# Patient Record
Sex: FEMALE | Race: BLACK OR AFRICAN AMERICAN
[De-identification: names, ages, dates, MRNs, and addresses within clinical notes are randomized per-mention and may not be internally consistent; named-entity substitution may affect disease eponyms.]

---

## 2021-04-10 ENCOUNTER — HOSPITAL ENCOUNTER (EMERGENCY)
Dept: HOSPITAL 19 - COL.ER | Age: 31
Discharge: HOME | End: 2021-04-10
Attending: FAMILY MEDICINE
Payer: MEDICAID

## 2021-04-10 VITALS — DIASTOLIC BLOOD PRESSURE: 77 MMHG | HEART RATE: 88 BPM | SYSTOLIC BLOOD PRESSURE: 140 MMHG

## 2021-04-10 VITALS — TEMPERATURE: 97.9 F

## 2021-04-10 VITALS — BODY MASS INDEX: 38.38 KG/M2 | WEIGHT: 230.38 LBS | HEIGHT: 65 IN

## 2021-04-10 DIAGNOSIS — Z32.02: ICD-10-CM

## 2021-04-10 DIAGNOSIS — M54.5: ICD-10-CM

## 2021-04-10 DIAGNOSIS — I10: ICD-10-CM

## 2021-04-10 DIAGNOSIS — Z90.710: ICD-10-CM

## 2021-04-10 DIAGNOSIS — N76.0: Primary | ICD-10-CM

## 2021-04-10 DIAGNOSIS — F17.210: ICD-10-CM

## 2021-04-10 LAB
ALBUMIN SERPL-MCNC: 4.8 GM/DL (ref 3.5–5)
ALP SERPL-CCNC: 70 U/L (ref 50–136)
ALT SERPL-CCNC: 25 U/L (ref 4–34)
ANION GAP SERPL CALC-SCNC: 11 MMOL/L (ref 7–16)
AST SERPL-CCNC: 30 U/L (ref 15–37)
BASOPHILS # BLD: 0.1 10*3/UL (ref 0–0.2)
BASOPHILS NFR BLD AUTO: 0.4 % (ref 0–2)
BILIRUB SERPL-MCNC: 0.5 MG/DL (ref 0–1)
BUN SERPL-MCNC: 13 MG/DL (ref 7–17)
CALCIUM SERPL-MCNC: 9.4 MG/DL (ref 8.4–10.2)
CHLORIDE SERPL-SCNC: 106 MMOL/L (ref 98–107)
CO2 SERPL-SCNC: 23 MMOL/L (ref 22–30)
CREAT SERPL-SCNC: 0.68 UMOL/L (ref 0.52–1.25)
EOSINOPHIL # BLD: 0.2 10*3/UL (ref 0–0.7)
EOSINOPHIL NFR BLD: 2 % (ref 0–4)
ERYTHROCYTE [DISTWIDTH] IN BLOOD BY AUTOMATED COUNT: 12 % (ref 11.5–14.5)
GLUCOSE SERPL-MCNC: 104 MG/DL (ref 74–106)
GRANULOCYTES # BLD AUTO: 73 % (ref 42.2–75.2)
HCT VFR BLD AUTO: 42.1 % (ref 37–47)
HGB BLD-MCNC: 14.5 G/DL (ref 12.5–16)
LYMPHOCYTES # BLD: 1.9 10*3/UL (ref 1.2–3.4)
LYMPHOCYTES NFR BLD: 17 % (ref 20–51)
MCH RBC QN AUTO: 31 PG (ref 27–31)
MCHC RBC AUTO-ENTMCNC: 34 G/DL (ref 33–37)
MCV RBC AUTO: 89 FL (ref 80–100)
MONOCYTES # BLD: 0.8 10*3/UL (ref 0.1–0.6)
MONOCYTES NFR BLD AUTO: 7.3 % (ref 1.7–9.3)
NEUTROPHILS # BLD: 8.3 10*3/UL (ref 1.4–6.5)
PH UR STRIP.AUTO: 5 [PH] (ref 5–8)
PLATELET # BLD AUTO: 198 K/MM3 (ref 130–400)
PMV BLD AUTO: 10.7 FL (ref 7.4–10.4)
POTASSIUM SERPL-SCNC: 3.5 MMOL/L (ref 3.4–5)
PROT SERPL-MCNC: 8.8 GM/DL (ref 6.4–8.2)
RBC # BLD AUTO: 4.73 M/MM3 (ref 4.1–5.3)
RBC # UR: (no result) /HPF
SODIUM SERPL-SCNC: 140 MMOL/L (ref 137–145)
SP GR UR STRIP.AUTO: 1.02 (ref 1–1.03)
SQUAMOUS # URNS: (no result) /HPF
UA DIPSTICK PNL UR STRIP.AUTO: (no result)
URN COLLECT METHOD CLASS: (no result)
WBC # UR: (no result) /HPF
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
## 2021-05-26 ENCOUNTER — HOSPITAL ENCOUNTER (EMERGENCY)
Dept: HOSPITAL 19 - COL.ER | Age: 31
Discharge: HOME | End: 2021-05-26
Attending: EMERGENCY MEDICINE
Payer: MEDICAID

## 2021-05-26 VITALS — TEMPERATURE: 98.9 F | DIASTOLIC BLOOD PRESSURE: 96 MMHG | SYSTOLIC BLOOD PRESSURE: 152 MMHG | HEART RATE: 83 BPM

## 2021-05-26 VITALS — BODY MASS INDEX: 36.73 KG/M2 | HEIGHT: 65 IN | WEIGHT: 220.46 LBS

## 2021-05-26 DIAGNOSIS — F17.210: ICD-10-CM

## 2021-05-26 DIAGNOSIS — Z90.710: ICD-10-CM

## 2021-05-26 DIAGNOSIS — J45.909: ICD-10-CM

## 2021-05-26 DIAGNOSIS — I10: ICD-10-CM

## 2021-05-26 DIAGNOSIS — E11.9: ICD-10-CM

## 2021-05-26 DIAGNOSIS — R45.851: Primary | ICD-10-CM

## 2021-05-26 DIAGNOSIS — Z79.899: ICD-10-CM

## 2021-05-26 DIAGNOSIS — N93.9: ICD-10-CM

## 2021-05-26 LAB
ALBUMIN SERPL-MCNC: 4.1 GM/DL (ref 3.5–5)
ALP SERPL-CCNC: 69 U/L (ref 50–136)
ALT SERPL-CCNC: 23 U/L (ref 4–34)
ANION GAP SERPL CALC-SCNC: 8 MMOL/L (ref 7–16)
APAP SERPL-MCNC: < 10 UG/ML (ref 10–30)
AST SERPL-CCNC: 25 U/L (ref 15–37)
BASOPHILS # BLD: 0.1 10*3/UL (ref 0–0.2)
BASOPHILS NFR BLD AUTO: 0.6 % (ref 0–2)
BILIRUB SERPL-MCNC: 0.2 MG/DL (ref 0–1)
BUN SERPL-MCNC: 17 MG/DL (ref 7–17)
CALCIUM SERPL-MCNC: 9.1 MG/DL (ref 8.4–10.2)
CHLORIDE SERPL-SCNC: 110 MMOL/L (ref 98–107)
CO2 SERPL-SCNC: 23 MMOL/L (ref 22–30)
CREAT SERPL-SCNC: 0.7 UMOL/L (ref 0.52–1.25)
DRUGS UR SCN NOM: NEGATIVE NG/ML
EOSINOPHIL # BLD: 0.4 10*3/UL (ref 0–0.7)
EOSINOPHIL NFR BLD: 5 % (ref 0–4)
ERYTHROCYTE [DISTWIDTH] IN BLOOD BY AUTOMATED COUNT: 12.3 % (ref 11.5–14.5)
ETHANOL SPEC-SCNC: < 10 MG/DL
GLUCOSE SERPL-MCNC: 98 MG/DL (ref 74–106)
GRANULOCYTES # BLD AUTO: 62.8 % (ref 42.2–75.2)
HCT VFR BLD AUTO: 38.8 % (ref 37–47)
HGB BLD-MCNC: 13.3 G/DL (ref 12.5–16)
LYMPHOCYTES # BLD: 1.9 10*3/UL (ref 1.2–3.4)
LYMPHOCYTES NFR BLD: 24.1 % (ref 20–51)
MCH RBC QN AUTO: 31 PG (ref 27–31)
MCHC RBC AUTO-ENTMCNC: 34 G/DL (ref 33–37)
MCV RBC AUTO: 92 FL (ref 80–100)
MONOCYTES # BLD: 0.6 10*3/UL (ref 0.1–0.6)
MONOCYTES NFR BLD AUTO: 7.2 % (ref 1.7–9.3)
NEUTROPHILS # BLD: 5 10*3/UL (ref 1.4–6.5)
PH UR STRIP.AUTO: 5 [PH] (ref 5–8)
PLATELET # BLD AUTO: 208 K/MM3 (ref 130–400)
PMV BLD AUTO: 11 FL (ref 7.4–10.4)
POTASSIUM SERPL-SCNC: 4.3 MMOL/L (ref 3.4–5)
PROT SERPL-MCNC: 7.4 GM/DL (ref 6.4–8.2)
RBC # BLD AUTO: 4.24 M/MM3 (ref 4.1–5.3)
RBC # UR STRIP.AUTO: (no result) /UL
RBC # UR: (no result) /HPF
SALICYLATES SERPL-MCNC: < 1 MG/DL
SODIUM SERPL-SCNC: 141 MMOL/L (ref 137–145)
SP GR UR STRIP.AUTO: 1.02 (ref 1–1.03)
SQUAMOUS # URNS: (no result) /HPF
TSH SERPL DL<=0.005 MIU/L-ACNC: 0.87 UIU/ML (ref 0.47–4.68)
URN COLLECT METHOD CLASS: (no result)

## 2021-09-03 ENCOUNTER — HOSPITAL ENCOUNTER (EMERGENCY)
Dept: HOSPITAL 19 - COL.ER | Age: 31
Discharge: HOME | End: 2021-09-03
Attending: PERSONAL EMERGENCY RESPONSE ATTENDANT
Payer: SELF-PAY

## 2021-09-03 VITALS — SYSTOLIC BLOOD PRESSURE: 164 MMHG | HEART RATE: 91 BPM | DIASTOLIC BLOOD PRESSURE: 94 MMHG

## 2021-09-03 VITALS — HEIGHT: 65 IN | WEIGHT: 200.4 LBS | BODY MASS INDEX: 33.39 KG/M2

## 2021-09-03 VITALS — TEMPERATURE: 97.4 F

## 2021-09-03 DIAGNOSIS — A59.9: ICD-10-CM

## 2021-09-03 DIAGNOSIS — E11.9: ICD-10-CM

## 2021-09-03 DIAGNOSIS — J06.9: Primary | ICD-10-CM

## 2021-09-03 DIAGNOSIS — F17.210: ICD-10-CM

## 2021-09-03 DIAGNOSIS — Z20.822: ICD-10-CM

## 2021-09-03 LAB
ALBUMIN SERPL-MCNC: 3.9 GM/DL (ref 3.5–5)
ALP SERPL-CCNC: 66 U/L (ref 50–136)
ALT SERPL-CCNC: 20 U/L (ref 4–34)
ANION GAP SERPL CALC-SCNC: 6 MMOL/L (ref 7–16)
AST SERPL-CCNC: 26 U/L (ref 15–37)
BASOPHILS # BLD: 0.1 10*3/UL (ref 0–0.2)
BASOPHILS NFR BLD AUTO: 0.5 % (ref 0–2)
BILIRUB SERPL-MCNC: 0.3 MG/DL (ref 0–1)
BUN SERPL-MCNC: 16 MG/DL (ref 7–17)
CALCIUM SERPL-MCNC: 8.9 MG/DL (ref 8.4–10.2)
CHLORIDE SERPL-SCNC: 109 MMOL/L (ref 98–107)
CO2 SERPL-SCNC: 25 MMOL/L (ref 22–30)
CREAT SERPL-SCNC: 0.84 UMOL/L (ref 0.52–1.25)
CRP SERPL-MCNC: < 0.5 MG/DL (ref 0–0.9)
EOSINOPHIL # BLD: 0.5 10*3/UL (ref 0–0.7)
EOSINOPHIL NFR BLD: 5.3 % (ref 0–4)
ERYTHROCYTE [DISTWIDTH] IN BLOOD BY AUTOMATED COUNT: 12.5 % (ref 11.5–14.5)
GLUCOSE SERPL-MCNC: 104 MG/DL (ref 74–106)
GRANULOCYTES # BLD AUTO: 58.5 % (ref 42.2–75.2)
HCT VFR BLD AUTO: 37.5 % (ref 37–47)
HGB BLD-MCNC: 13.1 G/DL (ref 12.5–16)
LIPASE SERPL-CCNC: 139 U/L (ref 23–300)
LYMPHOCYTES # BLD: 2.8 10*3/UL (ref 1.2–3.4)
LYMPHOCYTES NFR BLD: 30.1 % (ref 20–51)
MCH RBC QN AUTO: 31 PG (ref 27–31)
MCHC RBC AUTO-ENTMCNC: 35 G/DL (ref 33–37)
MCV RBC AUTO: 89 FL (ref 80–100)
MONOCYTES # BLD: 0.5 10*3/UL (ref 0.1–0.6)
MONOCYTES NFR BLD AUTO: 5.4 % (ref 1.7–9.3)
NEUTROPHILS # BLD: 5.4 10*3/UL (ref 1.4–6.5)
PH UR STRIP.AUTO: 8 [PH] (ref 5–8)
PLATELET # BLD AUTO: 214 K/MM3 (ref 130–400)
PMV BLD AUTO: 10.7 FL (ref 7.4–10.4)
POTASSIUM SERPL-SCNC: 3.8 MMOL/L (ref 3.4–5)
PROT SERPL-MCNC: 6.8 GM/DL (ref 6.4–8.2)
RBC # BLD AUTO: 4.23 M/MM3 (ref 4.1–5.3)
RBC # UR STRIP.AUTO: (no result) /UL
RBC # UR: (no result) /HPF
SODIUM SERPL-SCNC: 140 MMOL/L (ref 137–145)
SP GR UR STRIP.AUTO: 1.02 (ref 1–1.03)
SQUAMOUS # URNS: (no result) /HPF
URN COLLECT METHOD CLASS: (no result)

## 2021-09-15 ENCOUNTER — HOSPITAL ENCOUNTER (EMERGENCY)
Dept: HOSPITAL 19 - COL.ER | Age: 31
Discharge: HOME | End: 2021-09-15
Attending: EMERGENCY MEDICINE
Payer: SELF-PAY

## 2021-09-15 VITALS — HEART RATE: 72 BPM | DIASTOLIC BLOOD PRESSURE: 114 MMHG | SYSTOLIC BLOOD PRESSURE: 180 MMHG

## 2021-09-15 VITALS — TEMPERATURE: 98.9 F

## 2021-09-15 VITALS — WEIGHT: 220.46 LBS | HEIGHT: 65 IN | BODY MASS INDEX: 36.73 KG/M2

## 2021-09-15 DIAGNOSIS — F17.210: ICD-10-CM

## 2021-09-15 DIAGNOSIS — Z90.710: ICD-10-CM

## 2021-09-15 DIAGNOSIS — Z20.822: ICD-10-CM

## 2021-09-15 DIAGNOSIS — A04.72: Primary | ICD-10-CM

## 2021-09-15 LAB
ALBUMIN SERPL-MCNC: 4.1 GM/DL (ref 3.5–5)
ALP SERPL-CCNC: 67 U/L (ref 50–136)
ALT SERPL-CCNC: 22 U/L (ref 4–34)
ANION GAP SERPL CALC-SCNC: 7 MMOL/L (ref 7–16)
AST SERPL-CCNC: 32 U/L (ref 15–37)
BASOPHILS # BLD: 0.1 10*3/UL (ref 0–0.2)
BASOPHILS NFR BLD AUTO: 0.7 % (ref 0–2)
BILIRUB SERPL-MCNC: 0.3 MG/DL (ref 0–1)
BUN SERPL-MCNC: 8 MG/DL (ref 7–17)
C DIFF TOX A+B STL IA-ACNC: (no result)
C DIFF TOX A+B STL QL IA: (no result)
CALCIUM SERPL-MCNC: 8.8 MG/DL (ref 8.4–10.2)
CHLORIDE SERPL-SCNC: 107 MMOL/L (ref 98–107)
CO2 SERPL-SCNC: 25 MMOL/L (ref 22–30)
CREAT SERPL-SCNC: 0.73 UMOL/L (ref 0.52–1.25)
EOSINOPHIL # BLD: 0.5 10*3/UL (ref 0–0.7)
EOSINOPHIL NFR BLD: 5.8 % (ref 0–4)
ERYTHROCYTE [DISTWIDTH] IN BLOOD BY AUTOMATED COUNT: 12.9 % (ref 11.5–14.5)
GLUCOSE SERPL-MCNC: 103 MG/DL (ref 74–106)
GRANULOCYTES # BLD AUTO: 57.9 % (ref 42.2–75.2)
HCT VFR BLD AUTO: 38.5 % (ref 37–47)
HGB BLD-MCNC: 13.2 G/DL (ref 12.5–16)
LYMPHOCYTES # BLD: 2.6 10*3/UL (ref 1.2–3.4)
LYMPHOCYTES NFR BLD: 28.5 % (ref 20–51)
MCH RBC QN AUTO: 30 PG (ref 27–31)
MCHC RBC AUTO-ENTMCNC: 34 G/DL (ref 33–37)
MCV RBC AUTO: 89 FL (ref 80–100)
MONOCYTES # BLD: 0.6 10*3/UL (ref 0.1–0.6)
MONOCYTES NFR BLD AUTO: 6.9 % (ref 1.7–9.3)
NEUTROPHILS # BLD: 5.3 10*3/UL (ref 1.4–6.5)
PH UR STRIP.AUTO: 6 [PH] (ref 5–8)
PLATELET # BLD AUTO: 212 K/MM3 (ref 130–400)
PMV BLD AUTO: 10.5 FL (ref 7.4–10.4)
POTASSIUM SERPL-SCNC: 3.7 MMOL/L (ref 3.4–5)
PROT SERPL-MCNC: 7.1 GM/DL (ref 6.4–8.2)
RBC # BLD AUTO: 4.34 M/MM3 (ref 4.1–5.3)
RBC # UR: (no result) /HPF
SODIUM SERPL-SCNC: 139 MMOL/L (ref 137–145)
SP GR UR STRIP.AUTO: 1.02 (ref 1–1.03)
SQUAMOUS # URNS: (no result) /HPF
TSH SERPL DL<=0.005 MIU/L-ACNC: 2.15 UIU/ML (ref 0.35–4.94)
URN COLLECT METHOD CLASS: (no result)

## 2021-09-15 NOTE — NUR
met with patient in ED to provide community resources. Patient
does not have insurance coverage or a primary care provider which has created
barriers to obtaining needed presciption medications. Per ED RN, patient needs
an antibiotic upon discharge. SW reviewed [facility] price with patient and she
states she cannot afford to purchase any medications as her last paycheck when
entirely to her rent. Patient lives alone in [place] and works at [employer]. Patient states her mother, Morelia Otero (ph#[phone]) has
custody of her two daughters ages 6 and 8. Patient states she sends money to
them every two weeks. RICKI provided [facility] Resource Guide to patient who
advised she was going to call [facility] to set up an
appointment because [facility] could not get her in until
December. SW also provided information about [facility] to patient. RICKI provided a
medication voucher for patient totaling $111 as well as a taxi voucher so she
could  her medications and get home. RICKI collaborated the above
information to RNElin.

## 2021-10-06 ENCOUNTER — HOSPITAL ENCOUNTER (EMERGENCY)
Dept: HOSPITAL 19 - COL.ER | Age: 31
Discharge: HOME | End: 2021-10-06
Attending: EMERGENCY MEDICINE
Payer: SELF-PAY

## 2021-10-06 VITALS — TEMPERATURE: 98.6 F | DIASTOLIC BLOOD PRESSURE: 76 MMHG | SYSTOLIC BLOOD PRESSURE: 135 MMHG | HEART RATE: 80 BPM

## 2021-10-06 VITALS — WEIGHT: 220.46 LBS | HEIGHT: 65 IN | BODY MASS INDEX: 36.73 KG/M2

## 2021-10-06 DIAGNOSIS — Z20.822: ICD-10-CM

## 2021-10-06 DIAGNOSIS — I10: ICD-10-CM

## 2021-10-06 DIAGNOSIS — N89.8: ICD-10-CM

## 2021-10-06 DIAGNOSIS — E11.9: ICD-10-CM

## 2021-10-06 DIAGNOSIS — J40: Primary | ICD-10-CM

## 2021-10-06 DIAGNOSIS — Z79.899: ICD-10-CM

## 2021-10-06 DIAGNOSIS — F17.200: ICD-10-CM

## 2021-10-06 LAB
ALBUMIN SERPL-MCNC: 4 GM/DL (ref 3.5–5)
ALP SERPL-CCNC: 59 U/L (ref 0–750)
ALT SERPL-CCNC: 20 U/L (ref 0–55)
ANION GAP SERPL CALC-SCNC: 8 MMOL/L (ref 7–16)
AST SERPL-CCNC: 19 U/L (ref 5–34)
BASOPHILS # BLD: 0.1 K/MM3 (ref 0–0.2)
BASOPHILS NFR BLD AUTO: 0.5 % (ref 0–2)
BILIRUB SERPL-MCNC: 0.5 MG/DL (ref 0.2–1.2)
BUN SERPL-MCNC: 5 MG/DL (ref 7–19)
CALCIUM SERPL-MCNC: 9.6 MG/DL (ref 8.4–10.2)
CHLORIDE SERPL-SCNC: 107 MMOL/L (ref 98–107)
CO2 SERPL-SCNC: 25 MMOL/L (ref 22–29)
CREAT SERPL-SCNC: 0.75 MG/DL (ref 0.57–1.11)
EOSINOPHIL # BLD: 0.5 K/MM3 (ref 0–0.7)
EOSINOPHIL NFR BLD: 3.2 % (ref 0–4)
ERYTHROCYTE [DISTWIDTH] IN BLOOD BY AUTOMATED COUNT: 12.5 % (ref 11.5–14.5)
GLUCOSE SERPL-MCNC: 100 MG/DL (ref 70–99)
GRANULOCYTES # BLD AUTO: 76.9 % (ref 42.2–75.2)
HCT VFR BLD AUTO: 40.7 % (ref 37–47)
HGB BLD-MCNC: 14.2 G/DL (ref 12.5–16)
LYMPHOCYTES # BLD: 1.7 K/MM3 (ref 1.2–3.4)
LYMPHOCYTES NFR BLD: 11.2 % (ref 20–51)
MCH RBC QN AUTO: 31 PG (ref 27–31)
MCHC RBC AUTO-ENTMCNC: 35 G/DL (ref 33–37)
MCV RBC AUTO: 88 FL (ref 80–100)
MONOCYTES # BLD: 1.2 K/MM3 (ref 0.1–0.6)
MONOCYTES NFR BLD AUTO: 7.9 % (ref 1.7–9.3)
NEUTROPHILS # BLD: 11.5 K/MM3 (ref 1.4–6.5)
PH UR STRIP.AUTO: 7 [PH] (ref 5–8)
PLATELET # BLD AUTO: 193 K/MM3 (ref 130–400)
PMV BLD AUTO: 10.8 FL (ref 7.4–10.4)
POTASSIUM SERPL-SCNC: 3.9 MMOL/L (ref 3.5–4.5)
PROT SERPL-MCNC: 7.5 GM/DL (ref 6.2–8.1)
RBC # BLD AUTO: 4.61 M/MM3 (ref 4.1–5.3)
RBC # UR: (no result) /HPF
SODIUM SERPL-SCNC: 140 MMOL/L (ref 136–145)
SP GR UR STRIP.AUTO: 1.01 (ref 1–1.03)
SQUAMOUS # URNS: (no result) /HPF
URN COLLECT METHOD CLASS: (no result)

## 2021-12-10 ENCOUNTER — HOSPITAL ENCOUNTER (EMERGENCY)
Dept: HOSPITAL 19 - COL.ER | Age: 31
Discharge: HOME | End: 2021-12-10
Attending: FAMILY MEDICINE
Payer: SELF-PAY

## 2021-12-10 VITALS — DIASTOLIC BLOOD PRESSURE: 95 MMHG | HEART RATE: 78 BPM | SYSTOLIC BLOOD PRESSURE: 157 MMHG

## 2021-12-10 VITALS — TEMPERATURE: 98.1 F

## 2021-12-10 VITALS — BODY MASS INDEX: 37.58 KG/M2 | HEIGHT: 65 IN | WEIGHT: 225.53 LBS

## 2021-12-10 DIAGNOSIS — K02.9: Primary | ICD-10-CM

## 2021-12-10 DIAGNOSIS — F17.210: ICD-10-CM

## 2021-12-10 DIAGNOSIS — I10: ICD-10-CM

## 2021-12-10 DIAGNOSIS — J45.909: ICD-10-CM

## 2021-12-10 DIAGNOSIS — Z79.899: ICD-10-CM

## 2023-01-17 PROBLEM — F33.9 MAJOR DEPRESSION, RECURRENT (HCC): Status: ACTIVE | Noted: 2023-01-17

## 2023-01-17 PROBLEM — F29 PSYCHOSIS, UNSPECIFIED PSYCHOSIS TYPE (HCC): Status: ACTIVE | Noted: 2023-01-17

## 2023-01-18 PROBLEM — F19.10 POLYSUBSTANCE ABUSE (HCC): Status: ACTIVE | Noted: 2023-01-18

## 2023-01-18 PROBLEM — R60.0 LOCALIZED EDEMA: Status: ACTIVE | Noted: 2023-01-18

## 2023-01-18 PROBLEM — I10 PRIMARY HYPERTENSION: Status: ACTIVE | Noted: 2023-01-18

## 2023-01-20 PROBLEM — F14.20 COCAINE USE DISORDER, SEVERE, DEPENDENCE (HCC): Status: ACTIVE | Noted: 2023-01-20

## 2023-01-20 PROBLEM — F12.20 CANNABIS USE DISORDER, SEVERE, DEPENDENCE (HCC): Status: ACTIVE | Noted: 2023-01-20

## 2023-01-20 PROBLEM — F98.8 ADD (ATTENTION DEFICIT DISORDER): Status: ACTIVE | Noted: 2023-01-20

## 2023-02-15 ENCOUNTER — HOSPITAL ENCOUNTER (EMERGENCY)
Age: 33
Discharge: HOME | End: 2023-02-15
Payer: COMMERCIAL

## 2023-02-15 VITALS
OXYGEN SATURATION: 99 % | RESPIRATION RATE: 18 BRPM | TEMPERATURE: 98.3 F | DIASTOLIC BLOOD PRESSURE: 84 MMHG | HEART RATE: 99 BPM | SYSTOLIC BLOOD PRESSURE: 139 MMHG

## 2023-02-15 DIAGNOSIS — J02.8 SORE THROAT (VIRAL): Primary | ICD-10-CM

## 2023-02-15 DIAGNOSIS — R30.0 DYSURIA: ICD-10-CM

## 2023-02-15 DIAGNOSIS — R09.81 NASAL CONGESTION: ICD-10-CM

## 2023-02-15 DIAGNOSIS — R05.1 ACUTE COUGH: ICD-10-CM

## 2023-02-15 DIAGNOSIS — M79.10 MYALGIA: ICD-10-CM

## 2023-02-15 DIAGNOSIS — B97.89 SORE THROAT (VIRAL): Primary | ICD-10-CM

## 2023-02-15 LAB
BACTERIA: ABNORMAL /HPF
BILIRUBIN URINE: NEGATIVE MG/DL
BLOOD, URINE: ABNORMAL
CLARITY: CLEAR
COLOR: YELLOW
GLUCOSE, URINE: NEGATIVE MG/DL
KETONES, URINE: NEGATIVE MG/DL
LEUKOCYTE ESTERASE, URINE: NEGATIVE
MUCUS: ABNORMAL HPF
NITRITE URINE, QUANTITATIVE: NEGATIVE
PH, URINE: 6 (ref 5–8)
PROTEIN UA: NEGATIVE MG/DL
RAPID INFLUENZA  B AGN: NEGATIVE
RAPID INFLUENZA A AGN: NEGATIVE
RBC URINE: 2 /HPF (ref 0–6)
SARS-COV-2 RDRP RESP QL NAA+PROBE: ABNORMAL
SARS-COV-2 RDRP RESP QL NAA+PROBE: NOT DETECTED
SOURCE: ABNORMAL
SOURCE: NORMAL
SPECIFIC GRAVITY UA: 1.01 (ref 1–1.03)
SQUAMOUS EPITHELIAL: <1 /HPF
TRICHOMONAS: ABNORMAL /HPF
UROBILINOGEN, URINE: 0.2 MG/DL (ref 0.2–1)
WBC UA: 2 /HPF (ref 0–5)

## 2023-02-15 PROCEDURE — 87804 INFLUENZA ASSAY W/OPTIC: CPT

## 2023-02-15 PROCEDURE — 87430 STREP A AG IA: CPT

## 2023-02-15 PROCEDURE — 81001 URINALYSIS AUTO W/SCOPE: CPT

## 2023-02-15 PROCEDURE — 87635 SARS-COV-2 COVID-19 AMP PRB: CPT

## 2023-02-15 PROCEDURE — 87086 URINE CULTURE/COLONY COUNT: CPT

## 2023-02-15 PROCEDURE — 87081 CULTURE SCREEN ONLY: CPT

## 2023-02-15 PROCEDURE — 87077 CULTURE AEROBIC IDENTIFY: CPT

## 2023-02-15 PROCEDURE — 99283 EMERGENCY DEPT VISIT LOW MDM: CPT

## 2023-02-15 PROCEDURE — 87088 URINE BACTERIA CULTURE: CPT

## 2023-02-15 PROCEDURE — 87186 SC STD MICRODIL/AGAR DIL: CPT

## 2023-02-15 PROCEDURE — 6370000000 HC RX 637 (ALT 250 FOR IP): Performed by: NURSE PRACTITIONER

## 2023-02-15 RX ORDER — IBUPROFEN 600 MG/1
600 TABLET ORAL ONCE
Status: COMPLETED | OUTPATIENT
Start: 2023-02-15 | End: 2023-02-15

## 2023-02-15 RX ORDER — ACETAMINOPHEN 325 MG/1
650 TABLET ORAL ONCE
Status: COMPLETED | OUTPATIENT
Start: 2023-02-15 | End: 2023-02-15

## 2023-02-15 RX ORDER — GUAIFENESIN 600 MG/1
600 TABLET, EXTENDED RELEASE ORAL 2 TIMES DAILY
Qty: 10 TABLET | Refills: 0 | Status: SHIPPED | OUTPATIENT
Start: 2023-02-15 | End: 2023-02-20

## 2023-02-15 RX ORDER — PSEUDOEPHEDRINE HCL 30 MG
60 TABLET ORAL ONCE
Status: COMPLETED | OUTPATIENT
Start: 2023-02-15 | End: 2023-02-15

## 2023-02-15 RX ADMIN — IBUPROFEN 600 MG: 600 TABLET, FILM COATED ORAL at 08:03

## 2023-02-15 RX ADMIN — PSEUDOEPHEDRINE HCL 60 MG: 30 TABLET, FILM COATED ORAL at 08:03

## 2023-02-15 RX ADMIN — ACETAMINOPHEN 650 MG: 325 TABLET ORAL at 08:03

## 2023-02-15 NOTE — ED PROVIDER NOTES
EMERGENCY DEPARTMENT ENCOUNTER      PCP: No primary care provider on file. CHIEF COMPLAINT    Chief Complaint   Patient presents with    Fever    Pharyngitis    Congestion           HPI    Kirit Castro is a 35 y.o. female who presents with lanes of intermittent fevers, sore throat, nasal congestion, dry cough, body aches, nausea, and generally not feeling well. The patient states her symptoms have been present for the past couple of days. Triage note also states vaginal bleeding, however the patient states she is on her menstrual cycle and has no concerns. She has no concerns for pregnancy, STI, denies abdominal pain. She states she has had intermittent fevers at home up to 102.3. She is complaining of pain she rates as moderate, aching, and constant. Swallowing exacerbates her symptoms, Benadryl and Tylenol alleviate her symptoms. She denies any chest pain, shortness of breath, or other complaints. REVIEW OF SYSTEMS    Constitutional:  See HPI. HENT: See HPI. Cardiovascular:  Denies chest pain, palpitations   Respiratory: See HPI. GI:  Denies abdominal pain, nausea, vomiting, or diarrhea  :  Denies any urinary symptoms  Musculoskeletal:  Denies back pain  Skin:  Denies rash  Neurologic:  Denies headache, focal weakness or sensory changes   Endocrine:  Denies polyuria or polydypsia   Lymphatic:  Denies swollen glands     All other review of systems are negative  See HPI and nursing notes for additional information     PAST MEDICAL AND SURGICAL HISTORY    Past Medical History:   Diagnosis Date    Asthma     Hypertension      History reviewed. No pertinent surgical history.     CURRENT MEDICATIONS        ALLERGIES    No Known Allergies    SOCIAL AND FAMILY HISTORY    Social History     Socioeconomic History    Marital status: Single     Spouse name: None    Number of children: None    Years of education: None    Highest education level: None   Tobacco Use    Smoking status: Every Day Packs/day: 0.75     Types: Cigarettes    Smokeless tobacco: Never   Substance and Sexual Activity    Alcohol use: Not Currently    Drug use: Not Currently     History reviewed. No pertinent family history. PHYSICAL EXAM    VITAL SIGNS: /84   Pulse 99   Temp 98.3 °F (36.8 °C) (Oral)   Resp 18   LMP 02/15/2023 (Exact Date)   SpO2 99%    Constitutional:  Well developed, Well nourished. No distress  HENT:  Normocephalic, Atraumatic, PERRL. EOMI. Sclera clear. Conjunctiva normal, No discharge. Posterior pharynx and tonsils are slightly erythematous and swollen. No exudate. Uvula rises and falls midline. Good dentition. Neck/Lymphatics: supple, no JVD, no swollen nodes  Cardiovascular:   RRR,  no murmurs/rubs/gallops. No JVD  Respiratory:  Nonlabored breathing. Normal breath sounds, No wheezing  Abdomen:  Flat  Musculoskeletal:    Bilateral upper and lower extremity ROM intact without pain or obvious deficit  Integument:  Warm, Dry  Neurologic: Alert & oriented , No focal deficits noted. Cranial nerves II through XII grossly intact. Normal gross motor coordination & motor strength bilateral upper and lower extremities  Sensation intact.   Psychiatric:  Affect normal, Mood normal.       Labs:  Results for orders placed or performed during the hospital encounter of 02/15/23   COVID-19, Rapid    Specimen: Nasopharyngeal   Result Value Ref Range    Source NARES     SARS-CoV-2, NAAT INDETERMINATE (A) NOT DETECTED   Rapid Flu Swab    Specimen: Nasopharyngeal   Result Value Ref Range    Rapid Influenza A Ag NEGATIVE NEGATIVE    Rapid Influenza B Ag NEGATIVE NEGATIVE   Strep Screen Group A  - Throat    Specimen: Throat   Result Value Ref Range    Specimen THROAT     Special Requests NONE     Strep A Direct Screen NEGATIVE    Urinalysis   Result Value Ref Range    Color, UA YELLOW YELLOW    Clarity, UA CLEAR CLEAR    Glucose, Urine NEGATIVE NEGATIVE MG/DL    Bilirubin Urine NEGATIVE NEGATIVE MG/DL Ketones, Urine NEGATIVE NEGATIVE MG/DL    Specific Gravity, UA 1.010 1.001 - 1.035    Blood, Urine SMALL NUMBER OR AMOUNT OBSERVED (A) NEGATIVE    pH, Urine 6.0 5.0 - 8.0    Protein, UA NEGATIVE NEGATIVE MG/DL    Urobilinogen, Urine 0.2 0.2 - 1.0 MG/DL    Nitrite Urine, Quantitative NEGATIVE NEGATIVE    Leukocyte Esterase, Urine NEGATIVE NEGATIVE   Microscopic Urinalysis   Result Value Ref Range    RBC, UA 2 0 - 6 /HPF    WBC, UA 2 0 - 5 /HPF    Bacteria, UA RARE (A) NEGATIVE /HPF    Squam Epithel, UA <1 /HPF    Mucus, UA RARE (A) NEGATIVE HPF    Trichomonas, UA NONE SEEN NONE SEEN /HPF           CC/HPI Summary, DDx, ED Course, and Reassessment:   27-year-old female presents emergency department with complaints of sore throat, nasal congestion, intermittent fevers, body aches, dry cough, and generally not feeling well for the past couple of days. She states her mother was recently diagnosed with COVID and her daughter has strep throat. COVID, influenza, and strep swabs obtained. She was medicated with Tylenol and ibuprofen p.o. Patient told the nurse she is having urinary urgency and would like tested for UTI. UA ordered. History from : Patient    Limitations to history : None    Patient was given the following medications:  Medications   acetaminophen (TYLENOL) tablet 650 mg (650 mg Oral Given 2/15/23 0803)   ibuprofen (ADVIL;MOTRIN) tablet 600 mg (600 mg Oral Given 2/15/23 0803)   pseudoephedrine (SUDAFED) tablet 60 mg (60 mg Oral Given 2/15/23 0803)     Chronic conditions affecting care: none    Discussion with Other Profesionals : None    Social Determinants : None    Records Reviewed : None    Disposition Considerations (tests considered but not done, Shared Decision Making, Pt Expectation of Test or Tx.):   Appropriate for outpatient management    Strep negative. Influenza negative. Urinalysis showed rare bacteria otherwise no signs of infection, a culture is pending.   COVID swab was indeterminate. I discussed these findings with the patient and obtaining a second COVID swab. She states she would like to be discharged and have me call her with results. He is afebrile and appears nontoxic. Room air sat is 99% and lungs were clear to auscultation I did not feel imaging was necessary. I did instruct her on COVID instructions since she was exposed and she was instructed on quarantine instructions. She was instructed to take over-the-counter Tylenol and ibuprofen as directed for pain/fever, use over-the-counter throat lozenges as directed for sore throat, follow-up with a primary care provider within a week, and return here with worsening symptoms. Mucinex as directed. She verbalized understanding and agrees with plan of care. I am the Primary Clinician of Record. Second COVID swab is negative. Patient agrees to return emergency department if symptoms worsen or any new symptoms develop. Vital signs and nursing notes reviewed during ED course. I have low suspicion for sepsis, pneumonia, peritonsillar abscess, pyelonephritis, or other emergent condition. S    Clinical  IMPRESSION    1. Sore throat (viral)    2. Nasal congestion    3. Acute cough    4. Myalgia    5. Dysuria              Comment: Please note this report has been produced using speech recognition software and may contain errors related to that system including errors in grammar, punctuation, and spelling, as well as words and phrases that may be inappropriate. If there are any questions or concerns please feel free to contact the dictating provider for clarification.       JOI Rosen - CNP  02/16/23 0720

## 2023-02-15 NOTE — Clinical Note
Mukul Lloyd was seen and treated in our emergency department on 2/15/2023. She may return to work on 02/18/2023. If you have any questions or concerns, please don't hesitate to call.       Courtney De Leon, JOI - CNP

## 2023-02-15 NOTE — DISCHARGE INSTRUCTIONS
Tylenol and ibuprofen as directed for pain/fever. Use over-the-counter throat lozenges such as Sucrets or Cepacol as directed for sore throat. Mucinex as directed. Maintain hydration. Follow COVID instructions. You must quarantine for at least 5 days from onset of symptoms. If feeling better on day 6 he may come out of quarantine, if not you may just wait until at least 72 hours after symptoms have improved. Follow-up with a primary care provider within a week, call to schedule an appointment. Return to the emergency department with worsening symptoms.

## 2023-02-17 LAB
CULTURE: ABNORMAL
Lab: ABNORMAL
Lab: ABNORMAL
SPECIMEN: ABNORMAL
SPECIMEN: ABNORMAL
STREP A DIRECT SCREEN: NEGATIVE

## 2023-02-23 ENCOUNTER — TELEPHONE (OUTPATIENT)
Dept: PHARMACY | Age: 33
End: 2023-02-23

## 2023-02-23 NOTE — TELEPHONE ENCOUNTER
Pharmacy Note  ED Culture Follow-up    Cyndi Lang is a 35 y.o. female. Allergies: Patient has no known allergies. Labs:  No results found for: BUN, CREATININE, WBC  CrCl cannot be calculated (No successful lab value found. ). Current antimicrobials:   none    ASSESSMENT:  Micro results:   Throat culture: positive for Strep Pyogenes     PLAN:  Need for intervention: Yes  Discussed with: Dr. Tyler Alfaro treatment:    Start Amoxicillin 500mg BID x 10 days    Patient response:   Call attempt #3, did not reach patient. Unable to reach patient after 3 call attempts, sent letter on 2/23/23    Called/sent in prescription to: Not applicable    Please call with any questions.  TAYLOR Cunningham [facility] [place], PharmD 11:53 AM 2/23/2023

## 2023-03-04 ENCOUNTER — APPOINTMENT (OUTPATIENT)
Dept: GENERAL RADIOLOGY | Age: 33
DRG: 364 | End: 2023-03-04
Payer: COMMERCIAL

## 2023-03-04 ENCOUNTER — HOSPITAL ENCOUNTER (EMERGENCY)
Age: 33
Discharge: HOME | DRG: 364 | End: 2023-03-04
Attending: EMERGENCY MEDICINE
Payer: COMMERCIAL

## 2023-03-04 VITALS
BODY MASS INDEX: 35.82 KG/M2 | DIASTOLIC BLOOD PRESSURE: 85 MMHG | HEART RATE: 93 BPM | RESPIRATION RATE: 16 BRPM | OXYGEN SATURATION: 100 % | WEIGHT: 215 LBS | TEMPERATURE: 98.2 F | SYSTOLIC BLOOD PRESSURE: 136 MMHG | HEIGHT: 65 IN

## 2023-03-04 DIAGNOSIS — L03.115 CELLULITIS OF RIGHT FOOT: Primary | ICD-10-CM

## 2023-03-04 PROCEDURE — 99283 EMERGENCY DEPT VISIT LOW MDM: CPT

## 2023-03-04 PROCEDURE — 6370000000 HC RX 637 (ALT 250 FOR IP): Performed by: EMERGENCY MEDICINE

## 2023-03-04 PROCEDURE — 73620 X-RAY EXAM OF FOOT: CPT

## 2023-03-04 RX ORDER — HYDROCODONE BITARTRATE AND ACETAMINOPHEN 5; 325 MG/1; MG/1
1 TABLET ORAL EVERY 4 HOURS PRN
Qty: 5 TABLET | Refills: 0 | Status: SHIPPED | OUTPATIENT
Start: 2023-03-04 | End: 2023-03-05

## 2023-03-04 RX ORDER — SULFAMETHOXAZOLE AND TRIMETHOPRIM 800; 160 MG/1; MG/1
1 TABLET ORAL ONCE
Status: COMPLETED | OUTPATIENT
Start: 2023-03-04 | End: 2023-03-04

## 2023-03-04 RX ORDER — HYDROCODONE BITARTRATE AND ACETAMINOPHEN 5; 325 MG/1; MG/1
1 TABLET ORAL ONCE
Status: COMPLETED | OUTPATIENT
Start: 2023-03-04 | End: 2023-03-04

## 2023-03-04 RX ORDER — SULFAMETHOXAZOLE AND TRIMETHOPRIM 800; 160 MG/1; MG/1
1 TABLET ORAL 2 TIMES DAILY
Qty: 20 TABLET | Refills: 0 | Status: ON HOLD | OUTPATIENT
Start: 2023-03-04 | End: 2023-03-13 | Stop reason: HOSPADM

## 2023-03-04 RX ADMIN — HYDROCODONE BITARTRATE AND ACETAMINOPHEN 1 TABLET: 5; 325 TABLET ORAL at 06:11

## 2023-03-04 RX ADMIN — SULFAMETHOXAZOLE AND TRIMETHOPRIM 1 TABLET: 800; 160 TABLET ORAL at 06:11

## 2023-03-04 ASSESSMENT — PAIN DESCRIPTION - ORIENTATION: ORIENTATION: RIGHT;UPPER

## 2023-03-04 ASSESSMENT — PAIN DESCRIPTION - LOCATION: LOCATION: FOOT;TOE (COMMENT WHICH ONE)

## 2023-03-04 ASSESSMENT — PAIN SCALES - GENERAL: PAINLEVEL_OUTOF10: 10

## 2023-03-04 NOTE — ED NOTES
Patient arrives to ED with complaints of pain and swelling to the right great toe. Patient denies any injury at this time.       Sara Dutta RN  03/04/23 0403

## 2023-03-07 ENCOUNTER — APPOINTMENT (OUTPATIENT)
Dept: GENERAL RADIOLOGY | Age: 33
DRG: 364 | End: 2023-03-07
Payer: COMMERCIAL

## 2023-03-07 ENCOUNTER — APPOINTMENT (OUTPATIENT)
Dept: CT IMAGING | Age: 33
DRG: 364 | End: 2023-03-07
Payer: COMMERCIAL

## 2023-03-07 ENCOUNTER — HOSPITAL ENCOUNTER (INPATIENT)
Age: 33
LOS: 6 days | Discharge: HOME | DRG: 364 | End: 2023-03-13
Attending: EMERGENCY MEDICINE | Admitting: INTERNAL MEDICINE
Payer: COMMERCIAL

## 2023-03-07 DIAGNOSIS — L02.91 ABSCESS: ICD-10-CM

## 2023-03-07 DIAGNOSIS — L02.611 ABSCESS OF RIGHT FOOT: ICD-10-CM

## 2023-03-07 DIAGNOSIS — L03.115 CELLULITIS OF RIGHT FOOT: Primary | ICD-10-CM

## 2023-03-07 LAB
ALBUMIN SERPL-MCNC: 4.2 GM/DL (ref 3.4–5)
ALP BLD-CCNC: 77 IU/L (ref 40–128)
ALT SERPL-CCNC: 23 U/L (ref 10–40)
ANION GAP SERPL CALCULATED.3IONS-SCNC: 9 MMOL/L (ref 4–16)
AST SERPL-CCNC: 18 IU/L (ref 15–37)
BASOPHILS ABSOLUTE: 0 K/CU MM
BASOPHILS RELATIVE PERCENT: 0.4 % (ref 0–1)
BILIRUB SERPL-MCNC: 0.3 MG/DL (ref 0–1)
BUN SERPL-MCNC: 11 MG/DL (ref 6–23)
CALCIUM SERPL-MCNC: 9.4 MG/DL (ref 8.3–10.6)
CHLORIDE BLD-SCNC: 100 MMOL/L (ref 99–110)
CO2: 25 MMOL/L (ref 21–32)
CREAT SERPL-MCNC: 0.8 MG/DL (ref 0.6–1.1)
CRP SERPL HS-MCNC: 34.8 MG/L
DIFFERENTIAL TYPE: ABNORMAL
EOSINOPHILS ABSOLUTE: 0.4 K/CU MM
EOSINOPHILS RELATIVE PERCENT: 4.4 % (ref 0–3)
GFR SERPL CREATININE-BSD FRML MDRD: >60 ML/MIN/1.73M2
GLUCOSE SERPL-MCNC: 88 MG/DL (ref 70–99)
HCT VFR BLD CALC: 36.6 % (ref 37–47)
HEMOGLOBIN: 12.4 GM/DL (ref 12.5–16)
IMMATURE NEUTROPHIL %: 0.2 % (ref 0–0.43)
LACTIC ACID, SEPSIS: 0.8 MMOL/L (ref 0.5–1.9)
LACTIC ACID, SEPSIS: 1 MMOL/L (ref 0.5–1.9)
LYMPHOCYTES ABSOLUTE: 1.9 K/CU MM
LYMPHOCYTES RELATIVE PERCENT: 19.1 % (ref 24–44)
MCH RBC QN AUTO: 30.8 PG (ref 27–31)
MCHC RBC AUTO-ENTMCNC: 33.9 % (ref 32–36)
MCV RBC AUTO: 91 FL (ref 78–100)
MONOCYTES ABSOLUTE: 0.8 K/CU MM
MONOCYTES RELATIVE PERCENT: 7.7 % (ref 0–4)
NUCLEATED RBC %: 0 %
PDW BLD-RTO: 12.5 % (ref 11.7–14.9)
PLATELET # BLD: 197 K/CU MM (ref 140–440)
PMV BLD AUTO: 10.8 FL (ref 7.5–11.1)
POTASSIUM SERPL-SCNC: 4.1 MMOL/L (ref 3.5–5.1)
PROCALCITONIN SERPL-MCNC: 0.02 NG/ML
RBC # BLD: 4.02 M/CU MM (ref 4.2–5.4)
SEGMENTED NEUTROPHILS ABSOLUTE COUNT: 6.6 K/CU MM
SEGMENTED NEUTROPHILS RELATIVE PERCENT: 68.2 % (ref 36–66)
SODIUM BLD-SCNC: 134 MMOL/L (ref 135–145)
TOTAL IMMATURE NEUTOROPHIL: 0.02 K/CU MM
TOTAL NUCLEATED RBC: 0 K/CU MM
TOTAL PROTEIN: 7.2 GM/DL (ref 6.4–8.2)
WBC # BLD: 9.7 K/CU MM (ref 4–10.5)

## 2023-03-07 PROCEDURE — 6360000004 HC RX CONTRAST MEDICATION: Performed by: INTERNAL MEDICINE

## 2023-03-07 PROCEDURE — 2580000003 HC RX 258: Performed by: PHYSICIAN ASSISTANT

## 2023-03-07 PROCEDURE — 73630 X-RAY EXAM OF FOOT: CPT

## 2023-03-07 PROCEDURE — 6360000002 HC RX W HCPCS: Performed by: INTERNAL MEDICINE

## 2023-03-07 PROCEDURE — 94761 N-INVAS EAR/PLS OXIMETRY MLT: CPT

## 2023-03-07 PROCEDURE — 6370000000 HC RX 637 (ALT 250 FOR IP): Performed by: EMERGENCY MEDICINE

## 2023-03-07 PROCEDURE — 73701 CT LOWER EXTREMITY W/DYE: CPT

## 2023-03-07 PROCEDURE — 6370000000 HC RX 637 (ALT 250 FOR IP): Performed by: PHYSICIAN ASSISTANT

## 2023-03-07 PROCEDURE — 2580000003 HC RX 258: Performed by: EMERGENCY MEDICINE

## 2023-03-07 PROCEDURE — 83605 ASSAY OF LACTIC ACID: CPT

## 2023-03-07 PROCEDURE — 6360000002 HC RX W HCPCS: Performed by: PHYSICIAN ASSISTANT

## 2023-03-07 PROCEDURE — 2580000003 HC RX 258: Performed by: INTERNAL MEDICINE

## 2023-03-07 PROCEDURE — 85025 COMPLETE CBC W/AUTO DIFF WBC: CPT

## 2023-03-07 PROCEDURE — 80053 COMPREHEN METABOLIC PANEL: CPT

## 2023-03-07 PROCEDURE — 96365 THER/PROPH/DIAG IV INF INIT: CPT

## 2023-03-07 PROCEDURE — 99285 EMERGENCY DEPT VISIT HI MDM: CPT

## 2023-03-07 PROCEDURE — 87040 BLOOD CULTURE FOR BACTERIA: CPT

## 2023-03-07 PROCEDURE — 1200000000 HC SEMI PRIVATE

## 2023-03-07 PROCEDURE — 6370000000 HC RX 637 (ALT 250 FOR IP): Performed by: INTERNAL MEDICINE

## 2023-03-07 PROCEDURE — 6360000002 HC RX W HCPCS: Performed by: EMERGENCY MEDICINE

## 2023-03-07 PROCEDURE — 87150 DNA/RNA AMPLIFIED PROBE: CPT

## 2023-03-07 PROCEDURE — 84145 PROCALCITONIN (PCT): CPT

## 2023-03-07 PROCEDURE — 86140 C-REACTIVE PROTEIN: CPT

## 2023-03-07 RX ORDER — ACETAMINOPHEN 500 MG
500 TABLET ORAL EVERY 6 HOURS PRN
COMMUNITY

## 2023-03-07 RX ORDER — CLONIDINE HYDROCHLORIDE 0.2 MG/1
0.2 TABLET ORAL DAILY
Status: DISCONTINUED | OUTPATIENT
Start: 2023-03-08 | End: 2023-03-13 | Stop reason: HOSPADM

## 2023-03-07 RX ORDER — OXYCODONE HYDROCHLORIDE AND ACETAMINOPHEN 5; 325 MG/1; MG/1
1 TABLET ORAL EVERY 4 HOURS PRN
Status: DISCONTINUED | OUTPATIENT
Start: 2023-03-07 | End: 2023-03-13 | Stop reason: HOSPADM

## 2023-03-07 RX ORDER — VORTIOXETINE 10 MG/1
10 TABLET, FILM COATED ORAL DAILY
COMMUNITY
Start: 2023-02-15

## 2023-03-07 RX ORDER — ENOXAPARIN SODIUM 100 MG/ML
40 INJECTION SUBCUTANEOUS DAILY
Status: DISCONTINUED | OUTPATIENT
Start: 2023-03-08 | End: 2023-03-13 | Stop reason: HOSPADM

## 2023-03-07 RX ORDER — SODIUM CHLORIDE 0.9 % (FLUSH) 0.9 %
10 SYRINGE (ML) INJECTION PRN
Status: DISCONTINUED | OUTPATIENT
Start: 2023-03-07 | End: 2023-03-13 | Stop reason: HOSPADM

## 2023-03-07 RX ORDER — SODIUM CHLORIDE 0.9 % (FLUSH) 0.9 %
5-40 SYRINGE (ML) INJECTION EVERY 12 HOURS SCHEDULED
Status: DISCONTINUED | OUTPATIENT
Start: 2023-03-07 | End: 2023-03-13 | Stop reason: HOSPADM

## 2023-03-07 RX ORDER — MORPHINE SULFATE 2 MG/ML
2 INJECTION, SOLUTION INTRAMUSCULAR; INTRAVENOUS EVERY 4 HOURS PRN
Status: DISCONTINUED | OUTPATIENT
Start: 2023-03-07 | End: 2023-03-13 | Stop reason: HOSPADM

## 2023-03-07 RX ORDER — ACETAMINOPHEN 325 MG/1
650 TABLET ORAL EVERY 6 HOURS PRN
Status: DISCONTINUED | OUTPATIENT
Start: 2023-03-07 | End: 2023-03-13 | Stop reason: HOSPADM

## 2023-03-07 RX ORDER — ONDANSETRON 2 MG/ML
4 INJECTION INTRAMUSCULAR; INTRAVENOUS EVERY 6 HOURS PRN
Status: DISCONTINUED | OUTPATIENT
Start: 2023-03-07 | End: 2023-03-13 | Stop reason: HOSPADM

## 2023-03-07 RX ORDER — SODIUM CHLORIDE 0.9 % (FLUSH) 0.9 %
5-40 SYRINGE (ML) INJECTION PRN
Status: DISCONTINUED | OUTPATIENT
Start: 2023-03-07 | End: 2023-03-13 | Stop reason: HOSPADM

## 2023-03-07 RX ORDER — SENNA AND DOCUSATE SODIUM 50; 8.6 MG/1; MG/1
2 TABLET, FILM COATED ORAL DAILY
Status: DISCONTINUED | OUTPATIENT
Start: 2023-03-07 | End: 2023-03-13 | Stop reason: HOSPADM

## 2023-03-07 RX ORDER — POLYETHYLENE GLYCOL 3350 17 G/17G
17 POWDER, FOR SOLUTION ORAL DAILY PRN
Status: DISCONTINUED | OUTPATIENT
Start: 2023-03-07 | End: 2023-03-13 | Stop reason: HOSPADM

## 2023-03-07 RX ORDER — ACETAMINOPHEN 650 MG/1
650 SUPPOSITORY RECTAL EVERY 6 HOURS PRN
Status: DISCONTINUED | OUTPATIENT
Start: 2023-03-07 | End: 2023-03-13 | Stop reason: HOSPADM

## 2023-03-07 RX ORDER — HYDROXYZINE HYDROCHLORIDE 25 MG/1
25 TABLET, FILM COATED ORAL DAILY PRN
Status: DISCONTINUED | OUTPATIENT
Start: 2023-03-07 | End: 2023-03-13 | Stop reason: HOSPADM

## 2023-03-07 RX ORDER — CLONIDINE HYDROCHLORIDE 0.2 MG/1
0.2 TABLET ORAL DAILY
COMMUNITY
Start: 2023-02-15

## 2023-03-07 RX ORDER — IBUPROFEN 200 MG
600 TABLET ORAL EVERY 4 HOURS
Status: ON HOLD | COMMUNITY
End: 2023-03-13 | Stop reason: HOSPADM

## 2023-03-07 RX ORDER — ONDANSETRON 4 MG/1
4 TABLET, ORALLY DISINTEGRATING ORAL EVERY 8 HOURS PRN
Status: DISCONTINUED | OUTPATIENT
Start: 2023-03-07 | End: 2023-03-13 | Stop reason: HOSPADM

## 2023-03-07 RX ORDER — HYDROCODONE BITARTRATE AND ACETAMINOPHEN 5; 325 MG/1; MG/1
1 TABLET ORAL ONCE
Status: COMPLETED | OUTPATIENT
Start: 2023-03-07 | End: 2023-03-07

## 2023-03-07 RX ORDER — AMLODIPINE BESYLATE 10 MG/1
10 TABLET ORAL DAILY
COMMUNITY
Start: 2023-02-21

## 2023-03-07 RX ORDER — ATOMOXETINE 80 MG/1
80 CAPSULE ORAL DAILY
COMMUNITY

## 2023-03-07 RX ORDER — HYDROXYZINE HYDROCHLORIDE 25 MG/1
1-2 TABLET, FILM COATED ORAL NIGHTLY PRN
COMMUNITY
Start: 2023-01-26

## 2023-03-07 RX ORDER — POLYETHYLENE GLYCOL 3350 17 G/17G
17 POWDER, FOR SOLUTION ORAL DAILY
Status: DISCONTINUED | OUTPATIENT
Start: 2023-03-07 | End: 2023-03-13 | Stop reason: HOSPADM

## 2023-03-07 RX ORDER — AMLODIPINE BESYLATE 10 MG/1
10 TABLET ORAL DAILY
Status: DISCONTINUED | OUTPATIENT
Start: 2023-03-08 | End: 2023-03-13 | Stop reason: HOSPADM

## 2023-03-07 RX ORDER — SODIUM CHLORIDE 9 MG/ML
INJECTION, SOLUTION INTRAVENOUS PRN
Status: DISCONTINUED | OUTPATIENT
Start: 2023-03-07 | End: 2023-03-13 | Stop reason: HOSPADM

## 2023-03-07 RX ORDER — PANTOPRAZOLE SODIUM 40 MG/1
40 TABLET, DELAYED RELEASE ORAL
Status: DISCONTINUED | OUTPATIENT
Start: 2023-03-08 | End: 2023-03-13 | Stop reason: HOSPADM

## 2023-03-07 RX ADMIN — SENNOSIDES AND DOCUSATE SODIUM 2 TABLET: 50; 8.6 TABLET ORAL at 12:18

## 2023-03-07 RX ADMIN — SODIUM CHLORIDE, PRESERVATIVE FREE 10 ML: 5 INJECTION INTRAVENOUS at 19:53

## 2023-03-07 RX ADMIN — POLYETHYLENE GLYCOL (3350) 17 G: 17 POWDER, FOR SOLUTION ORAL at 12:18

## 2023-03-07 RX ADMIN — OXYCODONE AND ACETAMINOPHEN 1 TABLET: 5; 325 TABLET ORAL at 19:52

## 2023-03-07 RX ADMIN — VANCOMYCIN HYDROCHLORIDE 1250 MG: 1.25 INJECTION, POWDER, LYOPHILIZED, FOR SOLUTION INTRAVENOUS at 23:18

## 2023-03-07 RX ADMIN — HYDROCODONE BITARTRATE AND ACETAMINOPHEN 1 TABLET: 5; 325 TABLET ORAL at 10:47

## 2023-03-07 RX ADMIN — MORPHINE SULFATE 2 MG: 2 INJECTION, SOLUTION INTRAMUSCULAR; INTRAVENOUS at 22:54

## 2023-03-07 RX ADMIN — MORPHINE SULFATE 2 MG: 2 INJECTION, SOLUTION INTRAMUSCULAR; INTRAVENOUS at 13:25

## 2023-03-07 RX ADMIN — CEFEPIME 2000 MG: 2 INJECTION, POWDER, FOR SOLUTION INTRAVENOUS at 18:36

## 2023-03-07 RX ADMIN — MORPHINE SULFATE 2 MG: 2 INJECTION, SOLUTION INTRAMUSCULAR; INTRAVENOUS at 18:33

## 2023-03-07 RX ADMIN — HYDROXYZINE HYDROCHLORIDE 25 MG: 25 TABLET, FILM COATED ORAL at 23:06

## 2023-03-07 RX ADMIN — VANCOMYCIN HYDROCHLORIDE 2000 MG: 5 INJECTION, POWDER, LYOPHILIZED, FOR SOLUTION INTRAVENOUS at 11:05

## 2023-03-07 RX ADMIN — IOPAMIDOL 80 ML: 755 INJECTION, SOLUTION INTRAVENOUS at 14:01

## 2023-03-07 ASSESSMENT — PAIN DESCRIPTION - DESCRIPTORS
DESCRIPTORS: TINGLING
DESCRIPTORS: TINGLING
DESCRIPTORS: SHARP;STABBING;BURNING
DESCRIPTORS: SHARP

## 2023-03-07 ASSESSMENT — PAIN SCALES - GENERAL
PAINLEVEL_OUTOF10: 10
PAINLEVEL_OUTOF10: 4
PAINLEVEL_OUTOF10: 10
PAINLEVEL_OUTOF10: 8
PAINLEVEL_OUTOF10: 7

## 2023-03-07 ASSESSMENT — PAIN DESCRIPTION - LOCATION
LOCATION: FOOT
LOCATION: FOOT;ANKLE
LOCATION: FOOT
LOCATION: FOOT;ANKLE;LEG

## 2023-03-07 ASSESSMENT — PAIN DESCRIPTION - FREQUENCY
FREQUENCY: CONTINUOUS
FREQUENCY: CONTINUOUS

## 2023-03-07 ASSESSMENT — PAIN DESCRIPTION - ORIENTATION
ORIENTATION: RIGHT

## 2023-03-07 NOTE — ED NOTES
ED TO INPATIENT SBAR HANDOFF    Patient Name: Po Garcia   :  1990  35 y.o. MRN:  [id number]  Preferred Name  Deric Palma  ED Room #:  ED14/ED-14  Family/Caregiver Present no   Restraints no   Sitter no   Sepsis Risk Score Sepsis Risk Score: 0.4    Situation  Code Status: Full Code No additional code details. Allergies: Patient has no known allergies. Weight: No data found. Arrived from: home  Chief Complaint:   Chief Complaint   Patient presents with    Foot Injury     States that she injured her foot Friday. Has had increased swelling and pain moving up her foot and into her ankle. Pt family Dr believes she may need IV antibiotics     Hospital Problem/Diagnosis:  Principal Problem:    Cellulitis of right foot  Resolved Problems:    * No resolved hospital problems. *    Imaging:   XR FOOT RIGHT (MIN 3 VIEWS)   Final Result   1. Diffuse soft tissue swelling along the right foot, concerning for   infection. No underlying fracture or evidence of osteomyelitis. 2. Radiopaque material is noted along the 1st toe extending into the 1st and   2nd web space, of uncertain etiology. CT FOOT RIGHT W CONTRAST    (Results Pending)     Abnormal labs:   Abnormal Labs Reviewed   CBC WITH AUTO DIFFERENTIAL - Abnormal; Notable for the following components:       Result Value    RBC 4.02 (*)     Hemoglobin 12.4 (*)     Hematocrit 36.6 (*)     Segs Relative 68.2 (*)     Lymphocytes % 19.1 (*)     Monocytes % 7.7 (*)     Eosinophils % 4.4 (*)     All other components within normal limits   COMPREHENSIVE METABOLIC PANEL - Abnormal; Notable for the following components:    Sodium 134 (*)     All other components within normal limits   C-REACTIVE PROTEIN - Abnormal; Notable for the following components:    CRP High Sensitivity 34.8 (*)     All other components within normal limits     Critical values: no     Abnormal Assessment Findings:  To ED with c/o R foot pain and swelling worsening since she was seen here on Friday. Started on oral antibiotics Friday but pain and swelling has worsened. Pt no longer able to bare weight on R foot. Background  History:   Past Medical History:   Diagnosis Date    Asthma     Hypertension        Assessment    Vitals/MEWS: MEWS Score: 1  Level of Consciousness: Alert (0)   Vitals:    03/07/23 1202 03/07/23 1237 03/07/23 1300 03/07/23 1413   BP: 120/74   (!) 138/90   Pulse: 86 77 79 92   Resp: 19 16 15 16   Temp:    97.9 °F (36.6 °C)   TempSrc:    Oral   SpO2: 99% 98% 99% 99%     FiO2 (%): room air  O2 Flow Rate:      Cardiac Rhythm: NSR  Pain Assessment:  [x] Verbal [] Erinn Carrow Scale  Pain Scale: Pain Assessment  Pain Assessment: 0-10  Pain Level: 10  Patient's Stated Pain Goal: 0 - No pain  Pain Location: Foot, Ankle  Pain Orientation: Right  Pain Descriptors: Sharp, Stabbing, Burning  Pain Frequency: Continuous  Last documented pain score (0-10 scale) Pain Level: 10  Last documented pain medication administered: norco @ 1047   Mental Status: oriented and alert  NIH Score: NIH     C-SSRS: Risk of Suicide: No Risk  Bedside swallow:    [place] Coma Scale (GCS): Janeth Coma Scale  Eye Opening: Spontaneous  Best Verbal Response: Oriented  Best Motor Response: Obeys commands  [place] Coma Scale Score: 15  Active LDA's:   Peripheral IV 03/07/23 Right Antecubital (Active)     PO Status: Regular  Pertinent or High Risk Medications/Drips: no   If Yes, please provide details: n/a  Pending Blood Product Administration: no     You may also review the ED PT Care Timeline found under the Summary Nursing Index tab. Recommendation    Pending orders n/a  Plan for Discharge (if known):    Additional Comments: n/a   If any further questions, please call Sending RN at [phone]    Electronically signed by: Electronically signed by Ata Durham RN on 3/7/2023 at 2:14 PM       Ata Durham RN  03/07/23 [id number] Quiquekrish Jaimes RN  03/07/23 1949

## 2023-03-07 NOTE — H&P
V2.0  History and Physical      Name:  Claudetta Harvest /Age/Sex: 1990  (35 y.o. female)   MRN & CSN:  [id number] & [id number] Encounter Date/Time: 3/7/2023 1:30 PM EST   Location:  ED14/ED-14 PCP: Kristie Castillo [address] [facility] Day: 1    Assessment and Plan:   Claudetta Harvest is a 35 y.o. female with a past medical history of hypertension, asthma who presents with Cellulitis of right foot    R foot cellulitis   - failed outpatient bactrim   - afebrile without leukocytosis, HDS  - XR R foot with diffuse soft tissue swelling along the right foot concerning for infection, no underlying osteomyelitis  -Enrike site and monitor for progression  -continue vancomycin for now  - CT R foot pending, may consider ortho consult pending results   - blood cultures drawn in ED, check CRP and Pro-Trey    Epigastric abdominal pain  - benign abdominal exam   - Hgb 12.5, no signs of bleeding   - possibly gastritis in setting of heavy NSAID use  - start protonix, monitor H&H     Constipation   - start bowel regimen     Asthma   -No signs or symptoms of acute exacerbation    Hypertension  -Continue home clonidine and amlodipine    This patient was seen and examined in conjunction with Dr. Rahul Matthew. He was agreeable with the assessment and plan as dictated above. Hospital Problems             Last Modified POA    * (Principal) Cellulitis of right foot 3/7/2023 Yes       Disposition:   Current Living situation: home  Expected Disposition: same  Estimated D/C: 1-2 days    Diet No diet orders on file   DVT Prophylaxis [x] Lovenox, []  Heparin, [] SCDs, [] Ambulation,  [] Eliquis, [] Xarelto   Code Status No Order   Surrogate Decision Maker/ POA      History from:     patient      History of Present Illness:     Chief Complaint: Cellulitis of right foot      Patient presented to the emergency department with plaints of right foot pain and swelling.   She was diagnosed with cellulitis about 3 days ago and placed on outpatient Bactrim. She was taking this as prescribed however the erythema and pain continued to worsen. She had a fever of 101 at home. Also has had chills. Denies trauma to the foot although does admit that she does scratch aggressively when she has itching. Denies history of MRSA or IV drug use. Also is complaining of left upper quadrant/epigastric abdominal pain that started while she is in the emergency department. Does admit to heavy NSAID use over the last several days the pain in her foot. Denies black/tarry stools or blood in her stool. Denies history of ulcers. Denies nausea, vomiting. Review of Systems:   Ten point ROS reviewed and negative, unless otherwise noted above    Objective:   No intake or output data in the 24 hours ending 03/07/23 1330   Vitals:   Vitals:    03/07/23 1100 03/07/23 1132 03/07/23 1202 03/07/23 1300   BP: 124/72 128/75 120/74    Pulse: 82 76 86 79   Resp: 18 17 19 15   Temp: 97.4 °F (36.3 °C)      TempSrc: Oral      SpO2: 98% 100% 99% 99%       Medications Prior to Admission     Prior to Admission medications    Medication Sig Start Date End Date Taking?  Authorizing Provider   atomoxetine (STRATTERA) 80 MG capsule Take 80 mg by mouth daily   Yes Historical Provider, MD   ibuprofen (ADVIL;MOTRIN) 200 MG tablet Take 600 mg by mouth every 4 hours   Yes Historical Provider, MD   acetaminophen (TYLENOL) 500 MG tablet Take 500 mg by mouth every 6 hours as needed for Pain   Yes Historical Provider, MD   amLODIPine (NORVASC) 10 MG tablet Take 10 mg by mouth daily 2/21/23   Historical Provider, MD   cloNIDine (CATAPRES) 0.2 MG tablet Take 0.2 mg by mouth daily 03/07/23 prescription is for BID patient states she usually only takes once daily 2/15/23   Historical Provider, MD   TRINTELLIX 10 MG TABS tablet Take 10 mg by mouth daily 2/15/23   Historical Provider, MD   hydrOXYzine HCl (ATARAX) 25 MG tablet Take 1-2 tablets by mouth nightly as needed 1/26/23   Historical Provider, MD sulfamethoxazole-trimethoprim (BACTRIM DS) 800-160 MG per tablet Take 1 tablet by mouth 2 times daily for 10 days 3/4/23 3/14/23  Isa Lerner,        Physical Exam:   GEN Awake female, sitting upright in bed in no apparent distress. Appears given age. EYES   No scleral erythema, discharge, or conjunctivitis. HENT Mucous membranes are moist.  NECK Supple  RESP Clear to auscultation bilaterally, no wheezes, rales or rhonchi. Respirations even and unlabored on RA. CARDIO/VASC   S1/S2 auscultated. Regular rate without appreciable murmurs. GI Mild epigastric abdominal pain with palpation, no rebound or guarding, bowel sounds active     Bar[place]ona catheter is not present. MSK No gross joint deformities. Tenderness to palpation of the R foot. SKIN Normal coloration, warm, dry. R foot with dorsal erythema extending to the ankle  NEURO Cranial nerves appear grossly intact, normal speech, no lateralizing weakness. PSYCH Awake, alert, oriented x 4. Affect appropriate. Past Medical History:   PMHx   Past Medical History:   Diagnosis Date    Asthma     Hypertension      PSHX:  has no past surgical history on file. Allergies: No Known Allergies  Fam HX:  family history is not on file.   Soc HX:   Social History     Socioeconomic History    Marital status: Single     Spouse name: None    Number of children: None    Years of education: None    Highest education level: None   Tobacco Use    Smoking status: Every Day     Packs/day: 0.75     Types: Cigarettes    Smokeless tobacco: Never   Substance and Sexual Activity    Alcohol use: Not Currently    Drug use: Not Currently       Medications:   Medications:    [START ON 3/8/2023] pantoprazole  40 mg Oral QAM AC    polyethylene glycol  17 g Oral Daily    sennosides-docusate sodium  2 tablet Oral Daily      Infusions:   PRN Meds: morphine, 2 mg, Q4H PRN        Labs    CBC:   Recent Labs     03/07/23  1007   WBC 9.7   HGB 12.4*        BMP:    Recent Labs     03/07/23  1007   *   K 4.1      CO2 25   BUN 11   CREATININE 0.8   GLUCOSE 88     Hepatic:   Recent Labs     03/07/23  1007   AST 18   ALT 23   BILITOT 0.3   ALKPHOS 77     Lipids: No results found for: CHOL, HDL, TRIG  Hemoglobin A1C: No results found for: LABA1C  TSH: No results found for: TSH  Troponin: No results found for: TROPONINT  Lactic Acid: No results for input(s): LACTA in the last 72 hours. BNP: No results for input(s): PROBNP in the last 72 hours. UA:  Lab Results   Component Value Date/Time    NITRU NEGATIVE 02/15/2023 08:06 AM    COLORU YELLOW 02/15/2023 08:06 AM    WBCUA 2 02/15/2023 08:06 AM    RBCUA 2 02/15/2023 08:06 AM    MUCUS RARE 02/15/2023 08:06 AM    TRICHOMONAS NONE SEEN 02/15/2023 08:06 AM    BACTERIA RARE 02/15/2023 08:06 AM    CLARITYU CLEAR 02/15/2023 08:06 AM    SPECGRAV 1.010 02/15/2023 08:06 AM    LEUKOCYTESUR NEGATIVE 02/15/2023 08:06 AM    UROBILINOGEN 0.2 02/15/2023 08:06 AM    BILIRUBINUR NEGATIVE 02/15/2023 08:06 AM    BLOODU SMALL NUMBER OR AMOUNT OBSERVED 02/15/2023 08:06 AM    KETUA NEGATIVE 02/15/2023 08:06 AM     Urine Cultures: No results found for: Sharath Fuentes  Blood Cultures: No results found for: BC  No results found for: BLOODCULT2  Organism: No results found for: ORG    Imaging/Diagnostics Last 24 Hours   XR FOOT RIGHT (2 VIEWS)    Result Date: 3/4/2023  EXAMINATION: TWO XRAY VIEWS OF THE RIGHT FOOT 3/4/2023 5:27 am COMPARISON: None. HISTORY: ORDERING SYSTEM PROVIDED HISTORY: pain TECHNOLOGIST PROVIDED HISTORY: Reason for exam:->pain Reason for Exam: pain FINDINGS: No acute fracture or dislocation at the right foot. There is no cortical erosion or periosteal reaction. Some general swelling of the mid to distal foot is suggested. No radiopaque foreign bodies or soft tissue emphysema. No fracture, cortical erosion, or periosteal reaction.  Swelling of the mid to distal foot is suggested but without radiopaque foreign body or soft tissue emphysema. XR FOOT RIGHT (MIN 3 VIEWS)    Result Date: 3/7/2023  EXAMINATION: THREE XRAY VIEWS OF THE RIGHT FOOT 3/7/2023 10:17 am COMPARISON: 03/04/2023. HISTORY: ORDERING SYSTEM PROVIDED HISTORY: swelling TECHNOLOGIST PROVIDED HISTORY: Reason for exam:->swelling Reason for Exam: swelling FINDINGS: There is diffuse soft tissue swelling along the right foot which is concerning for underlying infection. There is radiopaque material noted along the 1st toe extending into the 1st and 2nd toe web space. No fracture or osseous destructive lesion. No periosteal reaction. Minimal degenerative changes are noted in the midfoot. 1. Diffuse soft tissue swelling along the right foot, concerning for infection. No underlying fracture or evidence of osteomyelitis. 2. Radiopaque material is noted along the 1st toe extending into the 1st and 2nd web space, of uncertain etiology. I discussed this patient with the ED provider and Dr. Simone Schmitz. I did a review of patient's medical records, lab results and imaging conducted today. I personally reviewed patient's vital signs including pulse ox.      Electronically signed by Eula Bob PA-C on 3/7/2023 at 1:30 PM

## 2023-03-07 NOTE — ED PROVIDER NOTES
EMERGENCY DEPARTMENT ENCOUNTER      CHIEF COMPLAINT:   Right foot pain and swelling    HPI: Cyndi Lang is a 35 y.o. female who presents to the Emergency Department complaining of right foot pain, swelling and redness. .  The symptoms started several days ago. She states she has athlete's foot and cracks between her toes. They started to get infected and so she was seen here on 3/4/2023 and diagnosed with cellulitis and started on Bactrim. She has been taking this as directed. She states it is getting worse rather than better. The infection is spreading up her foot towards her ankle. She states it is achy and sore. The pain is constant. It is worse with weightbearing and better with rest. The patient denies fevers, chills, chest pain, shortness of breath, abdominal pain, numbness, tingling, weakness, or any other complaints. REVIEW OF SYSTEMS:  CONSTITUTIONAL:  Denies fever, chills, weight loss or weakness  EYES:  Denies photophobia or discharge  ENT:  Denies sore throat or ear pain  CARDIOVASCULAR:  Denies chest pain, palpitations or swelling  RESPIRATORY:  Denies cough or shortness of breath  GI: Denies abdominal pain, nausea, vomiting, or diarrhea  MUSCULOSKELETAL:  Denies back pain  SKIN:  No rash  NEUROLOGIC:  Denies headache, focal weakness or sensory changes  All systems negative except as marked. \"Remaining review of systems reviewed and negative. I have reviewed the nursing triage documentation and agree unless otherwise noted below. \"    PAST MEDICAL HISTORY:   Past Medical History:   Diagnosis Date    Asthma     Hypertension        CURRENT MEDICATIONS:   Home medications reviewed. SURGICAL HISTORY:   Past Surgical History:   Procedure Laterality Date    FOOT DEBRIDEMENT Right 3/8/2023    FOOT INCISION AND DRAINAGE performed by Rachel Chinchilla DO at [address] [place] [address]:   History reviewed. No pertinent family history.     SOCIAL HISTORY:   Social History     Socioeconomic History Marital status: Single     Spouse name: Not on file    Number of children: Not on file    Years of education: Not on file    Highest education level: Not on file   Occupational History    Not on file   Tobacco Use    Smoking status: Every Day     Packs/day: 0.75     Types: Cigarettes    Smokeless tobacco: Never   Substance and Sexual Activity    Alcohol use: Not Currently    Drug use: Not Currently    Sexual activity: Not on file   Other Topics Concern    Not on file   Social History Narrative    Not on file     Social Determinants of Health     Financial Resource Strain: Not on file   Food Insecurity: Not on file   Transportation Needs: Not on file   Physical Activity: Not on file   Stress: Not on file   Social Connections: Not on file   Intimate Partner Violence: Not on file   Housing Stability: Not on file       ALLERGIES: Adhesive tape    PHYSICAL EXAM:  VITAL SIGNS:   ED Triage Vitals [03/07/23 1002]   Enc Vitals Group      /78      Heart Rate 86      Resp 18      Temp 97.4 °F (36.3 °C)      Temp Source Oral      SpO2 99 %      Weight       Height       Head Circumference       Peak Flow       Pain Score       Pain Loc       Pain Edu? Excl. in [address]? Constitutional:  Non-toxic appearance  HENT: Normocephalic, Atraumatic  Eyes: PERRL, conjunctiva normal   Neck: Normal range of motion, No tenderness, Supple, No stridor, No lymphadenopathy  Cardiovascular:  Normal heart rate, Normal rhythm  Pulmonary/Chest:  Normal breath sounds, No respiratory distress, No wheezing  Abdomen: Bowel sounds normal, Soft, No tenderness, No masses, No pulsatile masses  Extremities:  There is some breakdown of skin between the first and second digits on the right foot with some peeling skin noted between the other toes, there is swelling, redness and tender to palpation over the dorsum of the right foot extending from the toes up to just below the ankle, no crepitus, no subcutaneous emphysema, no necrosis, no skin sloughing, the peripheral pulses are strong, Capillary refill is brisk, there is normal motor and sensory function, the foot is pink, warm, and well perfused, there is no cyanosis  Skin:  Warm, Dry, No erythema, No rash      EKG:    None    Radiology / Procedures:  XR FOOT RIGHT (MIN 3 VIEWS) (Final result)  Result time 03/07/23 10:55:11  Final result by Princess John MD (03/07/23 10:55:11)                Impression:    1. Diffuse soft tissue swelling along the right foot, concerning for   infection. No underlying fracture or evidence of osteomyelitis. 2. Radiopaque material is noted along the 1st toe extending into the 1st and   2nd web space, of uncertain etiology. Narrative:    EXAMINATION:   THREE XRAY VIEWS OF THE RIGHT FOOT     3/7/2023 10:17 am     COMPARISON:   03/04/2023. HISTORY:   ORDERING SYSTEM PROVIDED HISTORY: swelling   TECHNOLOGIST PROVIDED HISTORY:   Reason for exam:->swelling   Reason for Exam: swelling     FINDINGS:   There is diffuse soft tissue swelling along the right foot which is   concerning for underlying infection. There is radiopaque material noted   along the 1st toe extending into the 1st and 2nd toe web space. No fracture   or osseous destructive lesion. No periosteal reaction. Minimal degenerative   changes are noted in the midfoot.                Labs Reviewed   CULTURE, BLOOD 2 - Abnormal; Notable for the following components:   CBC WITH AUTO DIFFERENTIAL - Abnormal; Notable for the following components:    RBC 4.02 (*)     Hemoglobin 12.4 (*)     Hematocrit 36.6 (*)     Segs Relative 68.2 (*)     Lymphocytes % 19.1 (*)     Monocytes % 7.7 (*)     Eosinophils % 4.4 (*)     All other components within normal limits   COMPREHENSIVE METABOLIC PANEL - Abnormal; Notable for the following components:    Sodium 134 (*)     All other components within normal limits   C-REACTIVE PROTEIN - Abnormal; Notable for the following components:    CRP High Sensitivity 34.8 (*)     All other components within normal limits   CULTURE, BLOOD 1    Narrative:     SETUP DATE/TIME:  03/07/2023 1045   LACTATE, SEPSIS   LACTATE, SEPSIS       ED COURSE & MEDICAL DECISION MAKING:  Светлана Tinsley is a 33 y.o. female who presents to the Emergency Department complaining of right foot pain, swelling and redness..  The symptoms started several days ago.  She states she has athlete's foot and cracks between her toes.  They started to get infected and so she was seen here on 3/4/2023 and diagnosed with cellulitis and started on Bactrim.  She has been taking this as directed.  She states it is getting worse rather than better.  The infection is spreading up her foot towards her ankle.  She states it is achy and sore.  The pain is constant.  It is worse with weightbearing and better with rest. The patient denies fevers, chills, chest pain, shortness of breath, abdominal pain, numbness, tingling, weakness, or any other complaints.    History from : Patient    Limitations to history : None    Chronic conditions affecting care: None    Social Determinants : None    Records Reviewed : Other ED notes from 3/4/2023    On exam, the patient is afebrile and nontoxic appearing.  She is hemodynamically stable and neurologically intact. Physical exam is significant for some broken down skin between the right toes with cellulitis extending up the foot.  There is no palpable abscess at this time. Labs are obtained and there are no clinically significant lab abnormalities.  White blood cell count is normal.  There is a slight left shift.  Lactic acid is normal.  Right foot x-rays are obtained and show diffuse soft tissue swelling along the right foot concerning for infection.  There is no underlying fracture or evidence of osteomyelitis.  There is radiopaque material noted along the first toe extending into the first and second webspace of uncertain etiology.  The patient was treated with medications as below and felt  significantly better. Patient was given the following medications:  Medications   HYDROcodone-acetaminophen (NORCO) 5-325 MG per tablet 1 tablet (1 tablet Oral Given 3/7/23 1047)   vancomycin (VANCOCIN) 2,000 mg in sodium chloride 0.9 % 500 mL IVPB (0 mg IntraVENous Stopped 3/7/23 1305)       Diagnosis and Differential Diagnosis:  I suspect that the patient has of the right foot. I have a low suspicion for DVT, acute fracture, dislocation, compartment syndrome, necrotizing fasciitis, or neurovascular injury. Disposition Considerations:  I recommended admission to the hospital if the patient has failed outpatient management and the patient was agreeable. I discussed the case with the hospitalist who will admit the patient for further treatment and care. The patient is currently in stable condition awaiting admission. I am the Primary Clinician of Record. Clinical Impression:  1. Cellulitis of right foot    Cellulitis    Disposition referral (if applicable):  [address] [place] [address] [phone]  Schedule an appointment as soon as possible for a visit in 1 week(s)  For right foot wound    Disposition medications (if applicable):  Current Discharge Medication List            Comment: Please note this report has been produced using speech recognition software and may contain errors related to that system including errors in grammar, punctuation, and spelling, as well as words and phrases that may be inappropriate. If there are any questions or concerns please feel free to contact the dictating provider for clarification.         Kathleen Herzog MD  03/12/23 5555

## 2023-03-07 NOTE — ED TRIAGE NOTES
Pt. Arrived with family, c/o right top foot swollen, red/purple discoloration, and edema, stating not able to bare weight or use the foot due to pain, seen last in ED Friday march 3rd and was diagnosed with cellulitis and prescribed antibiotic. Pt stating antibiotics not effective foot got worse.

## 2023-03-07 NOTE — ED NOTES
Medication History  [facility]    Patient Name: Eder Arredondo 1990     Medication history has been completed by: Emily Stanley CPhT    Source(s) of information: patient supplied medications from home and insurance claims     Primary Care Physician: JOI Mane - CNP     Pharmacy: [facility]    Allergies as of 03/07/2023    (No Known Allergies)        Prior to Admission medications    Medication Sig Start Date End Date Taking? Authorizing Provider   atomoxetine (STRATTERA) 80 MG capsule Take 80 mg by mouth daily   Yes Historical Provider, MD   ibuprofen (ADVIL;MOTRIN) 200 MG tablet Take 600 mg by mouth every 4 hours   Yes Historical Provider, MD   acetaminophen (TYLENOL) 500 MG tablet Take 500 mg by mouth every 6 hours as needed for Pain   Yes Historical Provider, MD   amLODIPine (NORVASC) 10 MG tablet Take 10 mg by mouth daily 2/21/23   Historical Provider, MD   cloNIDine (CATAPRES) 0.2 MG tablet Take 0.2 mg by mouth daily 03/07/23 prescription is for BID patient states she usually only takes once daily 2/15/23   Historical Provider, MD   TRINTELLIX 10 MG TABS tablet Take 10 mg by mouth daily 2/15/23   Historical Provider, MD   hydrOXYzine HCl (ATARAX) 25 MG tablet Take 1-2 tablets by mouth nightly as needed 1/26/23   Historical Provider, MD   sulfamethoxazole-trimethoprim (BACTRIM DS) 800-160 MG per tablet Take 1 tablet by mouth 2 times daily for 10 days 3/4/23 3/14/23  Ashanti Jaramillo, DO     Medications added or changed (ex. new medication, dosage change, interval change, formulation change):  See medication list as stated above. Comments:  Medication list reviewed with patient and insurance claims verified. Patient states she receives samples of Strattera from the drug company. Reports she has taken first dose of medications today. Recent order for clindamycin patient has not picked up this medication.     To my knowledge the above medication history is accurate as of 3/7/2023 11:50 AM.   [facility]   3/7/2023 11:50 AM

## 2023-03-07 NOTE — PROGRESS NOTES
CT R foot showed possible abscess between 1st and 2nd digit. Discussed with general surgery, planning for NPO at [facility] and OR in AM. Added cefepime in addition to vancomycin. Will place admission order.

## 2023-03-07 NOTE — CONSULTS
[address] [facility]  consulted by MARQUIS Purvis for monitoring and adjustment. Indication for treatment: Vancomycin indication: SSTI with risk factors   Goal trough: Trough Goal: 10-15 mcg/mL  AUC/CRISTA: <500    Risk Factors for MRSA Identified:   Purulent and/or complicated SSTI    Pertinent Laboratory Values:   Temp Readings from Last 3 Encounters:   03/07/23 97.9 °F (36.6 °C) (Oral)   03/04/23 98.2 °F (36.8 °C) (Oral)   02/15/23 98.3 °F (36.8 °C) (Oral)     Recent Labs     03/07/23  1007   WBC 9.7     Recent Labs     03/07/23  1007   BUN 11   CREATININE 0.8     Estimated Creatinine Clearance: 116 mL/min (based on SCr of 0.8 mg/dL). No intake or output data in the 24 hours ending 03/07/23 1459    Pertinent Cultures:   Date    Source    Results  3/7       Blood    Ordered    Vancomycin level:   TROUGH:  No results for input(s): VANCOTROUGH in the last 72 hours. RANDOM:  No results for input(s): VANCORANDOM in the last 72 hours. Assessment:  HPI: Roberto Carlos Kay is a 35 y.o. female with a past medical history of hypertension, asthma who presents with Cellulitis of right foot (failed outpatient Bactrim). No sign of underlying osteomyelitis. SCr, BUN, and urine output: SCR appears normal @0.8 (baseline unknown),   Day(s) of therapy: 1 of 7  Vancomycin concentration:   3/8 - random at 06:00    Plan:  The patient received vancomycin 2000mg ivpb x1 dose earlier today. Start vancomycin 1250mg ivpb q12h tonight for a predicted AUC of 461 at steady state. Check the vanco level tomorrow am  Pharmacy will continue to monitor patient and adjust therapy as indicated    Deacon 3 3/8 @06:00    Thank you for the consult. Mattie Evans, [facility]  3/7/2023 2:59 PM

## 2023-03-08 ENCOUNTER — ANESTHESIA (OUTPATIENT)
Dept: OPERATING ROOM | Age: 33
End: 2023-03-08
Payer: COMMERCIAL

## 2023-03-08 ENCOUNTER — ANESTHESIA EVENT (OUTPATIENT)
Dept: OPERATING ROOM | Age: 33
End: 2023-03-08
Payer: COMMERCIAL

## 2023-03-08 LAB
BASOPHILS ABSOLUTE: 0 K/CU MM
BASOPHILS RELATIVE PERCENT: 0.2 % (ref 0–1)
DIFFERENTIAL TYPE: ABNORMAL
EOSINOPHILS ABSOLUTE: 0 K/CU MM
EOSINOPHILS RELATIVE PERCENT: 0 % (ref 0–3)
HCT VFR BLD CALC: 41.9 % (ref 37–47)
HEMOGLOBIN: 14.1 GM/DL (ref 12.5–16)
IMMATURE NEUTROPHIL %: 0.2 % (ref 0–0.43)
INTERPRETATION: NORMAL
LYMPHOCYTES ABSOLUTE: 1.3 K/CU MM
LYMPHOCYTES RELATIVE PERCENT: 10.1 % (ref 24–44)
MCH RBC QN AUTO: 30.3 PG (ref 27–31)
MCHC RBC AUTO-ENTMCNC: 33.7 % (ref 32–36)
MCV RBC AUTO: 89.9 FL (ref 78–100)
MONOCYTES ABSOLUTE: 0.9 K/CU MM
MONOCYTES RELATIVE PERCENT: 7 % (ref 0–4)
NUCLEATED RBC %: 0 %
PDW BLD-RTO: 12.1 % (ref 11.7–14.9)
PLATELET # BLD: 292 K/CU MM (ref 140–440)
PMV BLD AUTO: 10.4 FL (ref 7.5–11.1)
PREGNANCY, URINE: NEGATIVE
RBC # BLD: 4.66 M/CU MM (ref 4.2–5.4)
SEGMENTED NEUTROPHILS ABSOLUTE COUNT: 10.9 K/CU MM
SEGMENTED NEUTROPHILS RELATIVE PERCENT: 82.5 % (ref 36–66)
TOTAL IMMATURE NEUTOROPHIL: 0.03 K/CU MM
TOTAL NUCLEATED RBC: 0 K/CU MM
WBC # BLD: 13.2 K/CU MM (ref 4–10.5)

## 2023-03-08 PROCEDURE — 2500000003 HC RX 250 WO HCPCS

## 2023-03-08 PROCEDURE — 80048 BASIC METABOLIC PNL TOTAL CA: CPT

## 2023-03-08 PROCEDURE — 1200000000 HC SEMI PRIVATE

## 2023-03-08 PROCEDURE — 3600000012 HC SURGERY LEVEL 2 ADDTL 15MIN: Performed by: SURGERY

## 2023-03-08 PROCEDURE — 7100000001 HC PACU RECOVERY - ADDTL 15 MIN: Performed by: SURGERY

## 2023-03-08 PROCEDURE — 2709999900 HC NON-CHARGEABLE SUPPLY: Performed by: SURGERY

## 2023-03-08 PROCEDURE — 87077 CULTURE AEROBIC IDENTIFY: CPT

## 2023-03-08 PROCEDURE — 36415 COLL VENOUS BLD VENIPUNCTURE: CPT

## 2023-03-08 PROCEDURE — 87075 CULTR BACTERIA EXCEPT BLOOD: CPT

## 2023-03-08 PROCEDURE — 6360000002 HC RX W HCPCS: Performed by: INTERNAL MEDICINE

## 2023-03-08 PROCEDURE — 2580000003 HC RX 258: Performed by: SURGERY

## 2023-03-08 PROCEDURE — 6370000000 HC RX 637 (ALT 250 FOR IP): Performed by: SURGERY

## 2023-03-08 PROCEDURE — 6360000002 HC RX W HCPCS: Performed by: PHYSICIAN ASSISTANT

## 2023-03-08 PROCEDURE — 94761 N-INVAS EAR/PLS OXIMETRY MLT: CPT

## 2023-03-08 PROCEDURE — 87147 CULTURE TYPE IMMUNOLOGIC: CPT

## 2023-03-08 PROCEDURE — 2580000003 HC RX 258: Performed by: ANESTHESIOLOGY

## 2023-03-08 PROCEDURE — 3700000000 HC ANESTHESIA ATTENDED CARE: Performed by: SURGERY

## 2023-03-08 PROCEDURE — 6360000002 HC RX W HCPCS

## 2023-03-08 PROCEDURE — 6360000002 HC RX W HCPCS: Performed by: ANESTHESIOLOGY

## 2023-03-08 PROCEDURE — 2580000003 HC RX 258: Performed by: PHYSICIAN ASSISTANT

## 2023-03-08 PROCEDURE — 87070 CULTURE OTHR SPECIMN AEROBIC: CPT

## 2023-03-08 PROCEDURE — 85025 COMPLETE CBC W/AUTO DIFF WBC: CPT

## 2023-03-08 PROCEDURE — 6360000002 HC RX W HCPCS: Performed by: SURGERY

## 2023-03-08 PROCEDURE — 6370000000 HC RX 637 (ALT 250 FOR IP): Performed by: PHYSICIAN ASSISTANT

## 2023-03-08 PROCEDURE — 81025 URINE PREGNANCY TEST: CPT

## 2023-03-08 PROCEDURE — 76937 US GUIDE VASCULAR ACCESS: CPT

## 2023-03-08 PROCEDURE — 3700000001 HC ADD 15 MINUTES (ANESTHESIA): Performed by: SURGERY

## 2023-03-08 PROCEDURE — 7100000000 HC PACU RECOVERY - FIRST 15 MIN: Performed by: SURGERY

## 2023-03-08 PROCEDURE — 87205 SMEAR GRAM STAIN: CPT

## 2023-03-08 PROCEDURE — 0K9V0ZZ DRAINAGE OF RIGHT FOOT MUSCLE, OPEN APPROACH: ICD-10-PCS | Performed by: SURGERY

## 2023-03-08 PROCEDURE — 6370000000 HC RX 637 (ALT 250 FOR IP): Performed by: INTERNAL MEDICINE

## 2023-03-08 PROCEDURE — 2500000003 HC RX 250 WO HCPCS: Performed by: SURGERY

## 2023-03-08 PROCEDURE — 3600000002 HC SURGERY LEVEL 2 BASE: Performed by: SURGERY

## 2023-03-08 PROCEDURE — 87186 SC STD MICRODIL/AGAR DIL: CPT

## 2023-03-08 PROCEDURE — 80202 ASSAY OF VANCOMYCIN: CPT

## 2023-03-08 RX ORDER — FENTANYL CITRATE 50 UG/ML
25 INJECTION, SOLUTION INTRAMUSCULAR; INTRAVENOUS EVERY 5 MIN PRN
Status: DISCONTINUED | OUTPATIENT
Start: 2023-03-08 | End: 2023-03-08 | Stop reason: HOSPADM

## 2023-03-08 RX ORDER — SODIUM CHLORIDE, SODIUM LACTATE, POTASSIUM CHLORIDE, CALCIUM CHLORIDE 600; 310; 30; 20 MG/100ML; MG/100ML; MG/100ML; MG/100ML
INJECTION, SOLUTION INTRAVENOUS CONTINUOUS
Status: DISCONTINUED | OUTPATIENT
Start: 2023-03-08 | End: 2023-03-13 | Stop reason: HOSPADM

## 2023-03-08 RX ORDER — LIDOCAINE HYDROCHLORIDE 10 MG/ML
INJECTION, SOLUTION INFILTRATION; PERINEURAL
Status: COMPLETED | OUTPATIENT
Start: 2023-03-08 | End: 2023-03-08

## 2023-03-08 RX ORDER — LABETALOL HYDROCHLORIDE 5 MG/ML
10 INJECTION, SOLUTION INTRAVENOUS
Status: DISCONTINUED | OUTPATIENT
Start: 2023-03-08 | End: 2023-03-08 | Stop reason: HOSPADM

## 2023-03-08 RX ORDER — ONDANSETRON 2 MG/ML
4 INJECTION INTRAMUSCULAR; INTRAVENOUS
Status: DISCONTINUED | OUTPATIENT
Start: 2023-03-08 | End: 2023-03-08 | Stop reason: HOSPADM

## 2023-03-08 RX ORDER — SODIUM CHLORIDE 0.9 % (FLUSH) 0.9 %
5-40 SYRINGE (ML) INJECTION EVERY 12 HOURS SCHEDULED
Status: DISCONTINUED | OUTPATIENT
Start: 2023-03-08 | End: 2023-03-08 | Stop reason: HOSPADM

## 2023-03-08 RX ORDER — IPRATROPIUM BROMIDE AND ALBUTEROL SULFATE 2.5; .5 MG/3ML; MG/3ML
1 SOLUTION RESPIRATORY (INHALATION)
Status: ACTIVE | OUTPATIENT
Start: 2023-03-08 | End: 2023-03-09

## 2023-03-08 RX ORDER — DEXAMETHASONE SODIUM PHOSPHATE 4 MG/ML
INJECTION, SOLUTION INTRA-ARTICULAR; INTRALESIONAL; INTRAMUSCULAR; INTRAVENOUS; SOFT TISSUE PRN
Status: DISCONTINUED | OUTPATIENT
Start: 2023-03-08 | End: 2023-03-08 | Stop reason: SDUPTHER

## 2023-03-08 RX ORDER — PROPOFOL 10 MG/ML
INJECTION, EMULSION INTRAVENOUS PRN
Status: DISCONTINUED | OUTPATIENT
Start: 2023-03-08 | End: 2023-03-08 | Stop reason: SDUPTHER

## 2023-03-08 RX ORDER — SODIUM CHLORIDE 0.9 % (FLUSH) 0.9 %
5-40 SYRINGE (ML) INJECTION PRN
Status: DISCONTINUED | OUTPATIENT
Start: 2023-03-08 | End: 2023-03-08 | Stop reason: HOSPADM

## 2023-03-08 RX ORDER — MEPERIDINE HYDROCHLORIDE 25 MG/ML
12.5 INJECTION INTRAMUSCULAR; INTRAVENOUS; SUBCUTANEOUS EVERY 5 MIN PRN
Status: DISCONTINUED | OUTPATIENT
Start: 2023-03-08 | End: 2023-03-08 | Stop reason: HOSPADM

## 2023-03-08 RX ORDER — ONDANSETRON 2 MG/ML
INJECTION INTRAMUSCULAR; INTRAVENOUS PRN
Status: DISCONTINUED | OUTPATIENT
Start: 2023-03-08 | End: 2023-03-08 | Stop reason: SDUPTHER

## 2023-03-08 RX ORDER — OXYCODONE HYDROCHLORIDE 5 MG/1
5 TABLET ORAL
Status: COMPLETED | OUTPATIENT
Start: 2023-03-08 | End: 2023-03-08

## 2023-03-08 RX ORDER — LIDOCAINE HYDROCHLORIDE 20 MG/ML
INJECTION, SOLUTION EPIDURAL; INFILTRATION; INTRACAUDAL; PERINEURAL PRN
Status: DISCONTINUED | OUTPATIENT
Start: 2023-03-08 | End: 2023-03-08 | Stop reason: SDUPTHER

## 2023-03-08 RX ORDER — DROPERIDOL 2.5 MG/ML
0.62 INJECTION, SOLUTION INTRAMUSCULAR; INTRAVENOUS EVERY 10 MIN PRN
Status: DISCONTINUED | OUTPATIENT
Start: 2023-03-08 | End: 2023-03-13 | Stop reason: HOSPADM

## 2023-03-08 RX ORDER — DIPHENHYDRAMINE HYDROCHLORIDE 50 MG/ML
12.5 INJECTION INTRAMUSCULAR; INTRAVENOUS
Status: COMPLETED | OUTPATIENT
Start: 2023-03-08 | End: 2023-03-08

## 2023-03-08 RX ORDER — HYDRALAZINE HYDROCHLORIDE 20 MG/ML
10 INJECTION INTRAMUSCULAR; INTRAVENOUS
Status: DISCONTINUED | OUTPATIENT
Start: 2023-03-08 | End: 2023-03-08 | Stop reason: HOSPADM

## 2023-03-08 RX ORDER — FENTANYL CITRATE 50 UG/ML
INJECTION, SOLUTION INTRAMUSCULAR; INTRAVENOUS PRN
Status: DISCONTINUED | OUTPATIENT
Start: 2023-03-08 | End: 2023-03-08 | Stop reason: SDUPTHER

## 2023-03-08 RX ADMIN — OXYCODONE AND ACETAMINOPHEN 1 TABLET: 5; 325 TABLET ORAL at 10:25

## 2023-03-08 RX ADMIN — FENTANYL CITRATE 25 MCG: 50 INJECTION INTRAMUSCULAR; INTRAVENOUS at 08:39

## 2023-03-08 RX ADMIN — SENNOSIDES AND DOCUSATE SODIUM 2 TABLET: 50; 8.6 TABLET ORAL at 11:15

## 2023-03-08 RX ADMIN — DIPHENHYDRAMINE HYDROCHLORIDE 12.5 MG: 50 INJECTION, SOLUTION INTRAMUSCULAR; INTRAVENOUS at 08:44

## 2023-03-08 RX ADMIN — FENTANYL CITRATE 25 MCG: 50 INJECTION INTRAMUSCULAR; INTRAVENOUS at 08:33

## 2023-03-08 RX ADMIN — PROPOFOL 200 MG: 10 INJECTION, EMULSION INTRAVENOUS at 07:44

## 2023-03-08 RX ADMIN — DEXAMETHASONE SODIUM PHOSPHATE 4 MG: 4 INJECTION, SOLUTION INTRAMUSCULAR; INTRAVENOUS at 07:58

## 2023-03-08 RX ADMIN — HYDROXYZINE HYDROCHLORIDE 25 MG: 25 TABLET, FILM COATED ORAL at 22:05

## 2023-03-08 RX ADMIN — ONDANSETRON 4 MG: 2 INJECTION INTRAMUSCULAR; INTRAVENOUS at 07:58

## 2023-03-08 RX ADMIN — MORPHINE SULFATE 2 MG: 2 INJECTION, SOLUTION INTRAMUSCULAR; INTRAVENOUS at 17:28

## 2023-03-08 RX ADMIN — HYDROMORPHONE HYDROCHLORIDE 0.5 MG: 1 INJECTION, SOLUTION INTRAMUSCULAR; INTRAVENOUS; SUBCUTANEOUS at 08:58

## 2023-03-08 RX ADMIN — AMLODIPINE BESYLATE 10 MG: 10 TABLET ORAL at 22:05

## 2023-03-08 RX ADMIN — CLONIDINE HYDROCHLORIDE 0.2 MG: 0.2 TABLET ORAL at 22:05

## 2023-03-08 RX ADMIN — HYDROMORPHONE HYDROCHLORIDE 1 MG: 1 INJECTION, SOLUTION INTRAMUSCULAR; INTRAVENOUS; SUBCUTANEOUS at 08:26

## 2023-03-08 RX ADMIN — SODIUM CHLORIDE, PRESERVATIVE FREE 10 ML: 5 INJECTION INTRAVENOUS at 09:55

## 2023-03-08 RX ADMIN — OXYCODONE HYDROCHLORIDE 5 MG: 5 TABLET ORAL at 14:05

## 2023-03-08 RX ADMIN — POLYETHYLENE GLYCOL (3350) 17 G: 17 POWDER, FOR SOLUTION ORAL at 10:22

## 2023-03-08 RX ADMIN — PANTOPRAZOLE SODIUM 40 MG: 40 TABLET, DELAYED RELEASE ORAL at 11:15

## 2023-03-08 RX ADMIN — SODIUM CHLORIDE, POTASSIUM CHLORIDE, SODIUM LACTATE AND CALCIUM CHLORIDE: 600; 310; 30; 20 INJECTION, SOLUTION INTRAVENOUS at 09:12

## 2023-03-08 RX ADMIN — CEFEPIME 2000 MG: 2 INJECTION, POWDER, FOR SOLUTION INTRAVENOUS at 16:15

## 2023-03-08 RX ADMIN — VANCOMYCIN HYDROCHLORIDE 1250 MG: 1.25 INJECTION, POWDER, LYOPHILIZED, FOR SOLUTION INTRAVENOUS at 09:55

## 2023-03-08 RX ADMIN — SODIUM CHLORIDE, POTASSIUM CHLORIDE, SODIUM LACTATE AND CALCIUM CHLORIDE: 600; 310; 30; 20 INJECTION, SOLUTION INTRAVENOUS at 07:42

## 2023-03-08 RX ADMIN — OXYCODONE AND ACETAMINOPHEN 1 TABLET: 5; 325 TABLET ORAL at 22:05

## 2023-03-08 RX ADMIN — FENTANYL CITRATE 100 MCG: 50 INJECTION, SOLUTION INTRAMUSCULAR; INTRAVENOUS at 07:44

## 2023-03-08 RX ADMIN — MORPHINE SULFATE 2 MG: 2 INJECTION, SOLUTION INTRAMUSCULAR; INTRAVENOUS at 03:33

## 2023-03-08 RX ADMIN — CEFEPIME 2000 MG: 2 INJECTION, POWDER, FOR SOLUTION INTRAVENOUS at 07:56

## 2023-03-08 RX ADMIN — HYDROMORPHONE HYDROCHLORIDE 0.5 MG: 1 INJECTION, SOLUTION INTRAMUSCULAR; INTRAVENOUS; SUBCUTANEOUS at 08:51

## 2023-03-08 RX ADMIN — SODIUM CHLORIDE, POTASSIUM CHLORIDE, SODIUM LACTATE AND CALCIUM CHLORIDE: 600; 310; 30; 20 INJECTION, SOLUTION INTRAVENOUS at 09:53

## 2023-03-08 RX ADMIN — ENOXAPARIN SODIUM 40 MG: 100 INJECTION SUBCUTANEOUS at 09:56

## 2023-03-08 RX ADMIN — LIDOCAINE HYDROCHLORIDE 100 MG: 20 INJECTION, SOLUTION EPIDURAL; INFILTRATION; INTRACAUDAL; PERINEURAL at 07:44

## 2023-03-08 ASSESSMENT — PAIN DESCRIPTION - PAIN TYPE
TYPE: SURGICAL PAIN;CHRONIC PAIN
TYPE: SURGICAL PAIN;CHRONIC PAIN

## 2023-03-08 ASSESSMENT — PAIN DESCRIPTION - FREQUENCY
FREQUENCY: CONTINUOUS
FREQUENCY: CONTINUOUS

## 2023-03-08 ASSESSMENT — ENCOUNTER SYMPTOMS
CONSTIPATION: 0
SHORTNESS OF BREATH: 0
SORE THROAT: 0
DIARRHEA: 0
TROUBLE SWALLOWING: 0
COUGH: 0
BLOOD IN STOOL: 0
EYE DISCHARGE: 0
ABDOMINAL PAIN: 0
STRIDOR: 0
BACK PAIN: 0
NAUSEA: 0
SHORTNESS OF BREATH: 1
ABDOMINAL DISTENTION: 0
VOMITING: 0
COLOR CHANGE: 0
WHEEZING: 0
CHOKING: 0

## 2023-03-08 ASSESSMENT — LIFESTYLE VARIABLES: SMOKING_STATUS: 1

## 2023-03-08 ASSESSMENT — PAIN DESCRIPTION - ORIENTATION
ORIENTATION: RIGHT

## 2023-03-08 ASSESSMENT — PAIN DESCRIPTION - DESCRIPTORS
DESCRIPTORS: SHARP
DESCRIPTORS: ACHING;THROBBING
DESCRIPTORS: SHARP;SHOOTING
DESCRIPTORS: DISCOMFORT
DESCRIPTORS: DISCOMFORT
DESCRIPTORS: BURNING;STABBING;SHARP
DESCRIPTORS: SHARP
DESCRIPTORS: BURNING;DISCOMFORT

## 2023-03-08 ASSESSMENT — PAIN - FUNCTIONAL ASSESSMENT
PAIN_FUNCTIONAL_ASSESSMENT: 0-10
PAIN_FUNCTIONAL_ASSESSMENT: ACTIVITIES ARE NOT PREVENTED
PAIN_FUNCTIONAL_ASSESSMENT: PREVENTS OR INTERFERES SOME ACTIVE ACTIVITIES AND ADLS

## 2023-03-08 ASSESSMENT — PAIN DESCRIPTION - LOCATION
LOCATION: FOOT

## 2023-03-08 ASSESSMENT — PAIN SCALES - GENERAL
PAINLEVEL_OUTOF10: 8
PAINLEVEL_OUTOF10: 10
PAINLEVEL_OUTOF10: 6
PAINLEVEL_OUTOF10: 9
PAINLEVEL_OUTOF10: 8
PAINLEVEL_OUTOF10: 9
PAINLEVEL_OUTOF10: 8
PAINLEVEL_OUTOF10: 8
PAINLEVEL_OUTOF10: 9
PAINLEVEL_OUTOF10: 7

## 2023-03-08 ASSESSMENT — PAIN DESCRIPTION - ONSET
ONSET: AWAKENED FROM SLEEP
ONSET: AWAKENED FROM SLEEP

## 2023-03-08 NOTE — PROGRESS NOTES
5301 Patient arrived to PACU, monitors placed and alarms on. Received report from Katie Mcallister Rn/Norman CRNA. Patient arouses easily, became tearful and crying out in pain. Medicated by CRNA.    3317 Medicated for complaint of right foot  pain  0839 Medicated for complaint of right foot pain  0844 Medicated for complaint of itching. 3495 No further complaint of itching. Requesting to use bedpan. Medicated for right foot pain. 7087 Medicated for complaint of right foot pain   0909 Called report to [facility]. Patient states she is feeling relief, rates pain 8/10.    0915 Patient unable to void, denies any further needs. 7966 Transported to room at this time.

## 2023-03-08 NOTE — ANESTHESIA PRE PROCEDURE
Department of Anesthesiology  Preprocedure Note       Name:  Светлана Tinsley   Age:  33 y.o.  :  1990                                          MRN:  [id number]         Date:  3/8/2023      Surgeon: Surgeon(s):  Jake Lambert DO    Procedure: Procedure(s):  FOOT INCISION AND DRAINAGE    Medications prior to admission:   Prior to Admission medications    Medication Sig Start Date End Date Taking? Authorizing Provider   atomoxetine (STRATTERA) 80 MG capsule Take 80 mg by mouth daily   Yes Historical Provider, MD   ibuprofen (ADVIL;MOTRIN) 200 MG tablet Take 600 mg by mouth every 4 hours   Yes Historical Provider, MD   acetaminophen (TYLENOL) 500 MG tablet Take 500 mg by mouth every 6 hours as needed for Pain   Yes Historical Provider, MD   amLODIPine (NORVASC) 10 MG tablet Take 10 mg by mouth daily 23   Historical Provider, MD   cloNIDine (CATAPRES) 0.2 MG tablet Take 0.2 mg by mouth daily 23 prescription is for BID patient states she usually only takes once daily 2/15/23   Historical Provider, MD   TRINTELLIX 10 MG TABS tablet Take 10 mg by mouth daily 2/15/23   Historical Provider, MD   hydrOXYzine HCl (ATARAX) 25 MG tablet Take 1-2 tablets by mouth nightly as needed 23   Historical Provider, MD   sulfamethoxazole-trimethoprim (BACTRIM DS) 800-160 MG per tablet Take 1 tablet by mouth 2 times daily for 10 days 3/4/23 3/14/23  Dasia Jaramillo DO       Current medications:    Current Facility-Administered Medications   Medication Dose Route Frequency Provider Last Rate Last Admin   • morphine (PF) injection 2 mg  2 mg IntraVENous Q4H PRN Jake Justice MD   2 mg at 23 0333   • pantoprazole (PROTONIX) tablet 40 mg  40 mg Oral QAM AC Jake Justice MD       • polyethylene glycol (GLYCOLAX) packet 17 g  17 g Oral Daily Jake Justice MD   17 g at 23 1218   • sennosides-docusate sodium (SENOKOT-S) 8.6-50 MG tablet 2 tablet  2 tablet Oral Daily Jake Justice MD   2 tablet at 23 1218   •  amLODIPine (NORVASC) tablet 10 mg  10 mg Oral Daily Marvin Mcnamara, PA-C        cloNIDine (CATAPRES) tablet 0.2 mg  0.2 mg Oral Daily Marvin Mcnamara, PA-C        hydrOXYzine HCl (ATARAX) tablet 25 mg  25 mg Oral Daily PRN Marvin Mcnamara, PA-C   25 mg at 03/07/23 2306    VORTIoxetine (TRINTELLIX) tablet 10 mg  10 mg Oral Daily Marvin Mcnamara, PA-C        sodium chloride flush 0.9 % injection 5-40 mL  5-40 mL IntraVENous 2 times per day Marvin Mcnamara, PA-C   10 mL at 03/07/23 1953    sodium chloride flush 0.9 % injection 5-40 mL  5-40 mL IntraVENous PRN Marvin Mcnamara, PA-C        0.9 % sodium chloride infusion   IntraVENous PRN Marvin Mcnamara, PA-C        enoxaparin (LOVENOX) injection 40 mg  40 mg SubCUTAneous Daily Marvin McnamaraDAIJA brewster-C        ondansetron (ZOFRAN-ODT) disintegrating tablet 4 mg  4 mg Oral Q8H PRN Marvin Mcnamara, PA-C        Or    ondansetron [facility]) injection 4 mg  4 mg IntraVENous Q6H PRN Marvin Mcnamara, PA-C        polyethylene glycol (GLYCOLAX) packet 17 g  17 g Oral Daily PRN Marvin Mcnamara, PA-C        acetaminophen (TYLENOL) tablet 650 mg  650 mg Oral Q6H PRN Marvin Mcnamara, PA-C        Or    acetaminophen (TYLENOL) suppository 650 mg  650 mg Rectal Q6H PRN Marvin Mcnamara, PA-C        oxyCODONE-acetaminophen (PERCOCET) 5-325 MG per tablet 1 tablet  1 tablet Oral Q4H PRN Marvin Mcnamara, PA-C   1 tablet at 03/07/23 1952    sodium chloride flush 0.9 % injection 10 mL  10 mL IntraVENous PRN Cammy Law MD        vancomycin (VANCOCIN) 1,250 mg in sodium chloride 0.9 % 250 mL IVPB ([id number])  1,250 mg IntraVENous Q12H Marvin McnamaraGIN brewsterC   Stopped at 03/08/23 0058    cefepime (MAXIPIME) 2,000 mg in sodium chloride 0.9 % 50 mL IVPB (mini-bag)  2,000 mg IntraVENous Q12H Cammy Law MD   Stopped at 03/07/23 1921       Allergies:     Allergies   Allergen Reactions    Adhesive Tape Itching       Problem List:    Patient Active Problem List   Diagnosis Code    Cellulitis of right foot L03.115    Abscess of right foot L02.611       Past Medical History:        Diagnosis Date    Asthma     Hypertension        Past Surgical History:  History reviewed. No pertinent surgical history. Social History:    Social History     Tobacco Use    Smoking status: Every Day     Packs/day: 0.75     Types: Cigarettes    Smokeless tobacco: Never   Substance Use Topics    Alcohol use: Not Currently                                Ready to quit: Not Answered  Counseling given: Not Answered      Vital Signs (Current):   Vitals:    03/08/23 0237 03/08/23 0333 03/08/23 0403 03/08/23 0709   BP: 136/86   133/71   Pulse: 90   98   Resp: 16 16 16 16   Temp: 37 °C (98.6 °F)   36.6 °C (97.8 °F)   TempSrc: Oral   Temporal   SpO2: 98%   98%   Weight:       Height:                                                  BP Readings from Last 3 Encounters:   03/08/23 133/71   03/04/23 136/85   02/15/23 139/84       NPO Status: Time of last liquid consumption: 2330                        Time of last solid consumption: 2130                        Date of last liquid consumption: 03/07/23                        Date of last solid food consumption: 03/07/23    BMI:   Wt Readings from Last 3 Encounters:   03/07/23 215 lb (97.5 kg)   03/04/23 215 lb (97.5 kg)     Body mass index is 35.78 kg/m².     CBC:   Lab Results   Component Value Date/Time    WBC 9.7 03/07/2023 10:07 AM    RBC 4.02 03/07/2023 10:07 AM    HGB 12.4 03/07/2023 10:07 AM    HCT 36.6 03/07/2023 10:07 AM    MCV 91.0 03/07/2023 10:07 AM    RDW 12.5 03/07/2023 10:07 AM     03/07/2023 10:07 AM       CMP:   Lab Results   Component Value Date/Time     03/07/2023 10:07 AM    K 4.1 03/07/2023 10:07 AM     03/07/2023 10:07 AM    CO2 25 03/07/2023 10:07 AM    BUN 11 03/07/2023 10:07 AM    CREATININE 0.8 03/07/2023 10:07 AM    LABGLOM >60 03/07/2023 10:07 AM    GLUCOSE 88 03/07/2023 10:07 AM    PROT 7.2 03/07/2023 10:07 AM    CALCIUM 9.4 03/07/2023 10:07 AM    BILITOT 0.3 03/07/2023 10:07 AM    ALKPHOS 77 03/07/2023 10:07 AM    AST 18 03/07/2023 10:07 AM    ALT 23 03/07/2023 10:07 AM       POC Tests: No results for input(s): POCGLU, POCNA, POCK, POCCL, POCBUN, POCHEMO, POCHCT in the last 72 hours.    Coags: No results found for: PROTIME, INR, APTT    HCG (If Applicable):   Lab Results   Component Value Date    PREGTESTUR NEGATIVE 03/08/2023        ABGs: No results found for: PHART, PO2ART, PCG9JIV, ETP4EEQ, BEART, U6HKOKTH     Type & Screen (If Applicable):  No results found for: LABABO, LABRH    Drug/Infectious Status (If Applicable):  No results found for: HIV, HEPCAB    COVID-19 Screening (If Applicable):   Lab Results   Component Value Date/Time    COVID19 NOT DETECTED 02/15/2023 09:29 AM           Anesthesia Evaluation    Airway: Mallampati: II          Dental: normal exam         Pulmonary:normal exam    (+) asthma: current smoker                           Cardiovascular:  Exercise tolerance: good (>4 METS),   (+) hypertension:,          Beta Blocker:  Not on Beta Blocker         Neuro/Psych:               GI/Hepatic/Renal: Neg GI/Hepatic/Renal ROS            Endo/Other: Negative Endo/Other ROS                    Abdominal:             Vascular:          Other Findings:           Anesthesia Plan      general     ASA 2       Induction: intravenous.      Anesthetic plan and risks discussed with patient.      Plan discussed with CRNA.                    JOI العلي - CRNA   3/8/2023

## 2023-03-08 NOTE — PROGRESS NOTES
03/08/23 7325 03/08/23 3034   Encounter Summary   Encounter Overview/Reason   --  Initial Encounter   Service Provided For:  --  Patient   Referral/Consult From:  --  [address]  --  Parent   Last Encounter   --  03/08/23  (initial visit. This  introduced spiritual care to patient/family.)   Complexity of Encounter  --  Low   Begin Time  --  0959   End Time   --  1006   Total Time Calculated  --  7 min   Encounter    Type  --  Initial Screen/Assessment   Spiritual/Emotional needs   Type  --  Spiritual Support   Grief, Loss, and Adjustments   Type  --  Adjustment to illness; Life Adjustments   Assessment/Intervention/Outcome   Assessment  --  Calm;Coping; Hopeful   Intervention  --  Active listening;Empowerment;Nurtured Hope;Sustaining Presence/Ministry of presence   Outcome  --  Coping;Encouraged;Engaged in conversation;Expressed feelings, needs, and concerns;Expressed Gratitude; Optimistic   Plan and Referrals   Plan/Referrals  --  Continue to visit, (comment)

## 2023-03-08 NOTE — PROGRESS NOTES
V2.0  [facility] Hospitalist Progress Note      Name:  Christian Upton /Age/Sex: 1990  (35 y.o. female)   MRN & CSN:  [id number] & [id number] Encounter Date/Time: 3/8/2023 10:12 AM EST    Location:  [address] [place] [address]-A PCP: Hung Pettit, [address] [facility] Day: 2    Assessment and Plan:   Christian Upton is a 35 y.o. female with pmh of hypertension, asthma  who presents with Cellulitis of right foot      Plan:  Right foot cellulitis-failed outpatient antibiotic therapy  - failed outpatient bactrim   - afebrile without leukocytosis, HDS  - XR R foot with diffuse soft tissue swelling along the right foot concerning for infection, no underlying osteomyelitis  -Enrike site and monitor for progression  -continue vancomycin for now  - CT R foot concerning for possible abscess between first and second digit, discussed with surgery by the medical team yesterday and plan for surgery today  - blood cultures drawn in ED,   - CRP elevated but Pro-Trey negative  -S/p I&D of right foot abscess 3/8  -Follow-up cultures from the OR    Epigastric abdominal pain  - benign abdominal exam   - Hgb 12.5, no signs of bleeding   - possibly gastritis in setting of heavy NSAID use  - start protonix, monitor H&H     Constipation   - start bowel regimen     Asthma   -No signs or symptoms of acute exacerbation    Hypertension  -Continue home clonidine and amlodipine    Diet Diet NPO   DVT Prophylaxis [x] Lovenox, []  Heparin, [] SCDs, [] Ambulation,  [] Eliquis, [] Xarelto  [] Coumadin   Code Status Full Code   Disposition From: Home  Expected Disposition: Home  Estimated Date of Discharge: 2-3 days  Patient requires continued admission due to right foot abscess   Surrogate Decision Maker/ POA      Subjective:     Chief Complaint: Foot Injury (States that she injured her foot Friday. Has had increased swelling and pain moving up her foot and into her ankle.  Pt family  believes she may need IV antibiotics)       Seen and evaluated at bedside postprocedure. Complains of pain at the surgical site, well controlled at present. Patient restarted on diet as she states she is starving. Review of Systems:    Review of Systems   Constitutional:  Positive for activity change, appetite change and fatigue. Negative for diaphoresis. HENT:  Negative for congestion and sore throat. Eyes:  Negative for discharge and visual disturbance. Respiratory:  Positive for shortness of breath. Negative for cough and wheezing. Cardiovascular:  Negative for chest pain, palpitations and leg swelling. Gastrointestinal:  Negative for abdominal distention, constipation and diarrhea. Genitourinary:  Negative for difficulty urinating and hematuria. Musculoskeletal:  Positive for arthralgias. Negative for back pain and gait problem. Skin:  Positive for wound. Negative for rash. Neurological:  Negative for dizziness, syncope and speech difficulty. Hematological:  Negative for adenopathy. Psychiatric/Behavioral:  Negative for agitation and confusion. All other systems reviewed and are negative. Objective: Intake/Output Summary (Last 24 hours) at 3/8/2023 1012  Last data filed at 3/8/2023 0920  Gross per 24 hour   Intake 810 ml   Output 0 ml   Net 810 ml        Vitals:   Vitals:    03/08/23 0915   BP: (!) 169/100   Pulse: 98   Resp: 17   Temp: 98.1 °F (36.7 °C)   SpO2: 94%       Physical Exam:   Physical Exam  Vitals and nursing note reviewed. Constitutional:       General: She is not in acute distress. Appearance: She is obese. She is not ill-appearing. HENT:      Head: Normocephalic and atraumatic. Nose: Nose normal.      Mouth/Throat:      Mouth: Mucous membranes are moist.   Eyes:      Extraocular Movements: Extraocular movements intact. Pupils: Pupils are equal, round, and reactive to light. Cardiovascular:      Rate and Rhythm: Normal rate and regular rhythm. Pulses: Normal pulses.       Heart sounds: Normal heart sounds. Pulmonary:      Effort: Pulmonary effort is normal.      Breath sounds: Normal breath sounds. Abdominal:      Palpations: Abdomen is soft. Musculoskeletal:         General: Normal range of motion. Cervical back: Normal range of motion. Comments: Right foot abscess post incision and drainage, covered with dressing, C/D/I   Skin:     General: Skin is warm. Capillary Refill: Capillary refill takes less than 2 seconds. Neurological:      General: No focal deficit present. Mental Status: She is alert and oriented to person, place, and time.    Psychiatric:         Mood and Affect: Mood normal.         Behavior: Behavior normal.          Medications:   Medications:    pantoprazole  40 mg Oral QAM AC    polyethylene glycol  17 g Oral Daily    sennosides-docusate sodium  2 tablet Oral Daily    amLODIPine  10 mg Oral Daily    cloNIDine  0.2 mg Oral Daily    VORTIoxetine  10 mg Oral Daily    sodium chloride flush  5-40 mL IntraVENous 2 times per day    enoxaparin  40 mg SubCUTAneous Daily    vancomycin  1,250 mg IntraVENous Q12H    cefepime  2,000 mg IntraVENous Q12H      Infusions:    lactated ringers IV soln 50 mL/hr at 03/08/23 0953    sodium chloride       PRN Meds: oxyCODONE, 5 mg, Once PRN  droperidol, 0.625 mg, Q10 Min PRN  ipratropium-albuterol, 1 ampule, Once PRN  morphine, 2 mg, Q4H PRN  hydrOXYzine HCl, 25 mg, Daily PRN  sodium chloride flush, 5-40 mL, PRN  sodium chloride, , PRN  ondansetron, 4 mg, Q8H PRN   Or  ondansetron, 4 mg, Q6H PRN  polyethylene glycol, 17 g, Daily PRN  acetaminophen, 650 mg, Q6H PRN   Or  acetaminophen, 650 mg, Q6H PRN  oxyCODONE-acetaminophen, 1 tablet, Q4H PRN  sodium chloride flush, 10 mL, PRN        Labs      Recent Results (from the past 24 hour(s))   Lactate, Sepsis    Collection Time: 03/07/23 12:30 PM   Result Value Ref Range    Lactic Acid, Sepsis 0.8 0.5 - 1.9 mMOL/L   C-Reactive Protein    Collection Time: 03/07/23 12:40 PM   Result Value Ref Range    CRP High Sensitivity 34.8 (H) <5.0 mg/L   Procalcitonin    Collection Time: 03/07/23 12:40 PM   Result Value Ref Range    Procalcitonin 0.024    PREGNANCY, URINE    Collection Time: 03/08/23  6:30 AM   Result Value Ref Range    Pregnancy, Urine NEGATIVE NEGATIVE    Interpretation       HCG Method Limitations: Very dilute specimens may have insufficient concentration of HCG to bring about a positive result. Imaging/Diagnostics Last 24 Hours   XR FOOT RIGHT (MIN 3 VIEWS)    Result Date: 3/7/2023  EXAMINATION: THREE XRAY VIEWS OF THE RIGHT FOOT 3/7/2023 10:17 am COMPARISON: 03/04/2023. HISTORY: ORDERING SYSTEM PROVIDED HISTORY: swelling TECHNOLOGIST PROVIDED HISTORY: Reason for exam:->swelling Reason for Exam: swelling FINDINGS: There is diffuse soft tissue swelling along the right foot which is concerning for underlying infection. There is radiopaque material noted along the 1st toe extending into the 1st and 2nd toe web space. No fracture or osseous destructive lesion. No periosteal reaction. Minimal degenerative changes are noted in the midfoot. 1. Diffuse soft tissue swelling along the right foot, concerning for infection. No underlying fracture or evidence of osteomyelitis. 2. Radiopaque material is noted along the 1st toe extending into the 1st and 2nd web space, of uncertain etiology. CT FOOT RIGHT W CONTRAST    Result Date: 3/7/2023  EXAMINATION: CT OF THE RIGHT FOOT WITH CONTRAST 3/7/2023 1:42 pm TECHNIQUE: CT of the right foot was performed with the administration of intravenous contrast.  Multiplanar reformatted images are provided for review. Automated exposure control, iterative reconstruction, and/or weight based adjustment of the mA/kV was utilized to reduce the radiation dose to as low as reasonably achievable. COMPARISON: Radiographs 03/04/2023 and 03/07/2023.  HISTORY ORDERING SYSTEM PROVIDED HISTORY: ro osteomyeltits TECHNOLOGIST PROVIDED HISTORY: Reason for exam:->ro osteomyeltits Decision Support Exception - unselect if not a suspected or confirmed emergency medical condition->Emergency Medical Condition (MA) Reason for Exam: R/O osteomyelitis FINDINGS: Bones: No evidence of acute fracture or dislocation. No aggressive appearing osseous abnormality or periostitis. Soft Tissue: Lobulated fluid collection along the dorsal 1st and 2nd webspace with adjacent hyperdense material, concerning for infection, measuring approximately 2.0 x 0.6 x 2.9 cm. Diffuse soft tissue stranding and skin thickening, nonspecific. No discrete soft tissue gas. No discrete fully retracted tendon tear. Focal soft tissue ulceration along the dorsal margin of the 1st distal phalanx. Joint: No significant degenerative changes. No osseous erosions. Peripherally enhancing collection between the 1st and 2nd digits is concerning for abscess. Adjacent hyperdense material is nonspecific. Diffuse soft tissue stranding and skin thickening is concerning for cellulitis. No discrete CT evidence of osteomyelitis. Recommend further evaluation with MRI.        Electronically signed by Caridad Hernandez MD on 3/8/2023 at 10:12 AM

## 2023-03-08 NOTE — ANESTHESIA POSTPROCEDURE EVALUATION
Department of Anesthesiology  Postprocedure Note    Patient: Татьяна Gutiérrez  MRN: [id number]  YOB: 1990  Date of evaluation: 3/8/2023      Procedure Summary     Date: 03/08/23 Room / Location: [address] [place] [address] OR [address] [place] [address]    Anesthesia Start: 1902 Anesthesia Stop: 9689    Procedure: FOOT INCISION AND DRAINAGE (Right: Foot) Diagnosis:       Abscess      (Abscess [L02.91])    Surgeons: Isidro Crouch DO Responsible Provider: Saba Whyte MD    Anesthesia Type: General ASA Status: 2          Anesthesia Type: General    Ernst Phase I: Ernst Score: 10    Ernst Phase II:        Anesthesia Post Evaluation    Patient location during evaluation: PACU  Patient participation: complete - patient participated  Level of consciousness: awake and alert  Pain score: 8  Airway patency: patent  Nausea & Vomiting: no nausea and no vomiting  Complications: no  Cardiovascular status: blood pressure returned to baseline  Respiratory status: acceptable  Hydration status: euvolemic

## 2023-03-08 NOTE — CONSULTS
Department of GeneralSurgery   Surgical Service Dr. Job Kendrick Note    Date of Consult: 3/8/23        Reason for Consult: Right foot abscess  Requesting Physician: Primary    CHIEF COMPLAINT: Right foot abscess    History Obtained From:  patient    HISTORY OF PRESENT ILLNESS:                The patient is a 35 y.o. female who presents with right foot abscess. Notes developed right foot cellulitis progressing to abscess in the past week. She has had CT showing peripheral enhancing collection between first and second webspace with adjacent hyperdense material concerning for infection. Erythema extending over foot with edema. Past Medical History:    Past Medical History:   Diagnosis Date    Asthma     Hypertension        Past Surgical History:    History reviewed. No pertinent surgical history.     Current Medications:   Current Facility-Administered Medications   Medication Dose Route Frequency Provider Last Rate Last Admin    morphine (PF) injection 2 mg  2 mg IntraVENous Q4H PRN Fani Jensen MD   2 mg at 03/08/23 0333    pantoprazole (PROTONIX) tablet 40 mg  40 mg Oral QAM AC Fani Jensen MD        polyethylene glycol (GLYCOLAX) packet 17 g  17 g Oral Daily Fani Jensen MD   17 g at 03/07/23 1218    sennosides-docusate sodium (SENOKOT-S) 8.6-50 MG tablet 2 tablet  2 tablet Oral Daily Fani Jensen MD   2 tablet at 03/07/23 1218    amLODIPine (NORVASC) tablet 10 mg  10 mg Oral Daily DAIJA Jack-JORGE        cloNIDine (CATAPRES) tablet 0.2 mg  0.2 mg Oral Daily DAIJA Jack-C        hydrOXYzine HCl (ATARAX) tablet 25 mg  25 mg Oral Daily PRN DAIJA Jack-C   25 mg at 03/07/23 2306    VORTIoxetine (TRINTELLIX) tablet 10 mg  10 mg Oral Daily Matthew Clayton PA-C        sodium chloride flush 0.9 % injection 5-40 mL  5-40 mL IntraVENous 2 times per day Matthew Clatyon, PA-C   10 mL at 03/07/23 1953    sodium chloride flush 0.9 % injection 5-40 mL  5-40 mL IntraVENous PRN Matthew Clayton PA-C        0.9 % sodium chloride infusion   IntraVENous PRN Noah Urrutia PA-C        enoxaparin (LOVENOX) injection 40 mg  40 mg SubCUTAneous Daily Noah Urrutia PA-C        ondansetron (ZOFRAN-ODT) disintegrating tablet 4 mg  4 mg Oral Q8H PRN Noah Urrutia PA-C        Or    [facility]ansetron [facility]) injection 4 mg  4 mg IntraVENous Q6H PRN Noah Urrutia PA-C        polyethylene glycol (GLYCOLAX) packet 17 g  17 g Oral Daily PRN Noah Urrutia PA-C        acetaminophen (TYLENOL) tablet 650 mg  650 mg Oral Q6H PRN Noah Urrutia PA-C        Or    acetaminophen (TYLENOL) suppository 650 mg  650 mg Rectal Q6H PRN Noah Urrutia PA-C        oxyCODONE-acetaminophen (PERCOCET) 5-325 MG per tablet 1 tablet  1 tablet Oral Q4H PRN Noah Urrutia PA-C   1 tablet at 03/07/23 1952    sodium chloride flush 0.9 % injection 10 mL  10 mL IntraVENous PRN Elena Bain MD        vancomycin (VANCOCIN) 1,250 mg in sodium chloride 0.9 % 250 mL IVPB ([id number])  1,250 mg IntraVENous Q12H Noah Urrutia PA-C   Stopped at 03/08/23 0058    cefepime (MAXIPIME) 2,000 mg in sodium chloride 0.9 % 50 mL IVPB (mini-bag)  2,000 mg IntraVENous Q12H Elena Bain MD   Stopped at 03/07/23 1921       Allergies:  Adhesive tape    Social History:   Social History     Socioeconomic History    Marital status: Single     Spouse name: None    Number of children: None    Years of education: None    Highest education level: None   Tobacco Use    Smoking status: Every Day     Packs/day: 0.75     Types: Cigarettes    Smokeless tobacco: Never   Substance and Sexual Activity    Alcohol use: Not Currently    Drug use: Not Currently       Family History:   History reviewed. No pertinent family history. REVIEW OFSYSTEMS:    Review of Systems   Constitutional:  Negative for chills, fatigue, fever and unexpected weight change. HENT:  Negative for sore throat and trouble swallowing. Respiratory:  Negative for cough, choking, shortness of breath and stridor.     Cardiovascular: Negative for chest pain, palpitations and leg swelling. Gastrointestinal:  Negative for abdominal distention, abdominal pain, blood in stool, nausea and vomiting. Genitourinary:  Negative for difficulty urinating and dysuria. Musculoskeletal:  Negative for back pain, gait problem and joint swelling. Skin:  Positive for wound. Negative for color change and rash. Allergic/Immunologic: Negative for immunocompromised state. Neurological:  Negative for dizziness, speech difficulty, weakness and light-headedness. Hematological:  Negative for adenopathy. Does not bruise/bleed easily. Psychiatric/Behavioral:  Negative for agitation and confusion. The patient is not nervous/anxious. PHYSICAL EXAM:  Vitals:    03/08/23 0237 03/08/23 0333 03/08/23 0403 03/08/23 0709   BP: 136/86   133/71   Pulse: 90   98   Resp: 16 16 16 16   Temp: 98.6 °F (37 °C)   97.8 °F (36.6 °C)   TempSrc: Oral   Temporal   SpO2: 98%   98%   Weight:       Height:           Physical Exam  General: No acute distress  Respiratory: Chest rise equal bilaterally  CV: Appears well perfused   Abdomen: Soft, nontender, nondistended, no rebound or guarding  Right foot with abscess with fluctuance between the first 2 digits. Erythema/edema over foot to ankle. DATA:    CBC:   Lab Results   Component Value Date/Time    WBC 9.7 03/07/2023 10:07 AM    RBC 4.02 03/07/2023 10:07 AM    HGB 12.4 03/07/2023 10:07 AM    HCT 36.6 03/07/2023 10:07 AM    MCV 91.0 03/07/2023 10:07 AM    MCH 30.8 03/07/2023 10:07 AM    MCHC 33.9 03/07/2023 10:07 AM    RDW 12.5 03/07/2023 10:07 AM     03/07/2023 10:07 AM    MPV 10.8 03/07/2023 10:07 AM       IMPRESSION:        Patient Active Problem List:     Cellulitis of right foot     Abscess of right foot      Abscess right foot    PLAN:    Discussed risk and benefits of procedure including risk of anesthesia, bleeding, need for additional procedures, postoperative wound care.   Patient states understanding and agrees to proceed with I&D of right foot abscess.         Leander Merlin, DO

## 2023-03-08 NOTE — PLAN OF CARE
Problem: Discharge Planning  Goal: Discharge to home or other facility with appropriate resources  3/8/2023 0125 by Chaz Smith LPN  Outcome: Progressing  3/7/2023 1611 by Joleen Aldridge RN  Outcome: Progressing     Problem: Pain  Goal: Verbalizes/displays adequate comfort level or baseline comfort level  3/8/2023 0125 by Chaz Smith LPN  Outcome: Progressing  3/7/2023 1611 by Joleen Aldridge RN  Outcome: Progressing     Problem: Safety - Adult  Goal: Free from fall injury  3/8/2023 0125 by Chaz Smith LPN  Outcome: Progressing  3/7/2023 1611 by Joleen Aldridge RN  Outcome: Progressing

## 2023-03-08 NOTE — CARE COORDINATION
Case Management Assessment  Initial Evaluation    Date/Time of Evaluation: 3/8/2023 2:47 PM  Assessment Completed by: Jaun Schaumann, RN    If patient is discharged prior to next notation, then this note serves as note for discharge by case management. Patient Name: Karlene Tejada                   YOB: 1990  Diagnosis: Cellulitis of right foot [L03.115]  Abscess of right foot [L02.611]                   Date / Time: 3/7/2023  9:45 AM    Patient Admission Status: Inpatient   Readmission Risk (Low < 19, Mod (19-27), High > 27): Readmission Risk Score: 9.6    Current PCP: JOI Merida CNP  PCP verified by CM? Yes    Chart Reviewed: Yes      History Provided by: Medical Record  Patient Orientation: Alert and Oriented, Person, Place, Situation    Patient Cognition: Alert    Hospitalization in the last 30 days (Readmission):  No    If yes, Readmission Assessment in CM Navigator will be completed. Advance Directives:      Code Status: Full Code   Patient's Primary Decision Maker is: Legal Next of Kin      Discharge Planning:    Patient lives with: Children, Family Members, Parent Type of Home: House  Primary Care Giver: Self  Patient Support Systems include: Family Members   Current Financial resources: Medicaid  Current community resources: None  Current services prior to admission: None            Current DME:              Type of Home Care services:  None    ADLS  Prior functional level: Independent in ADLs/IADLs  Current functional level: Independent in ADLs/IADLs    PT AM-PAC:   /24  OT AM-PAC:   /24    Family can provide assistance at DC: Yes  Would you like Case Management to discuss the discharge plan with any other family members/significant others, and if so, who? No  Plans to Return to Present Housing: Yes  Other Identified Issues/Barriers to RETURNING to current housing: yes  Potential Assistance needed at discharge:  Other (Comment)            Potential DME:    Patient expects to discharge to: [address] [place] [address] for transportation at discharge:      Financial    Payor: Charlene Davis / Plan: Jamison Jin / Product Type: *No Product type* /     Does insurance require precert for SNF: Yes    Potential assistance Purchasing Medications: No  Meds-to-Beds request: Yes      [address], [address] [phone] Illa Delay [address] [facility] [address]  Phone: [phone] Fax: [phone]      Notes:    Factors facilitating achievement of predicted outcomes: Good insight into deficits    Barriers to discharge: Pain    Additional Case Management Notes: Patient from home, independent prior to admission. Patient has PCP and insurance. No needs at this time. CM available should any new needs arise. The Plan for Transition of Care is related to the following treatment goals of Cellulitis of right foot [L03.115]  Abscess of right foot [M47.972]    IF APPLICABLE: The Patient and/or patient representative Светлана and her family were provided with a choice of provider and agrees with the discharge plan. Freedom of choice list with basic dialogue that supports the patient's individualized plan of care/goals and shares the quality data associated with the providers was provided to:     Patient Representative Name:       The Patient and/or Patient Representative Agree with the Discharge Plan?       Sarah Nielsen RN  Case Management Department  Ph:  Fax:

## 2023-03-08 NOTE — OP NOTE
Operative Note      Patient: Hammad Espinal  YOB: 1990  MRN: [id number]    Date of Procedure: 3/8/2023    Pre-Op Diagnosis: Abscess [L02.91]    Post-Op Diagnosis: Same       Procedure(s):  FOOT INCISION AND DRAINAGE-right    Surgeon(s):  Aisha Fernandez DO    Assistant:   * No surgical staff found *    Anesthesia: Choice    Estimated Blood Loss (mL): Minimal    Complications: None    Specimens:   ID Type Source Tests Collected by Time Destination   1 : RIGHT FOOT PUSS Tissue Tissue CULTURE, SURGICAL Aisha Fernandez DO 3/8/2023 0801        Implants:  * No implants in log *      Drains: * No LDAs found *    Findings: Abscess between first/second toe    Detailed Description of Procedure:     After sedation foot was prepped and draped in usual sterile fashion. Small incision was created at webspace between first/second toes. Purulent drainage was cultured. Abscess cavity down to muscle/fascia and was irrigated and hemostasis achieved electrocautery. Wound packed with quarter inch out a form ribbon and Kerlix dressing. Patient tolerated procedure well without complication. Transported to recovery area in stable condition.     Electronically signed by Aisha Fernandez DO on 3/8/2023 at 8:23 AM

## 2023-03-08 NOTE — PROGRESS NOTES
4 Eyes Skin Assessment     NAME:  Enedelia Mayers  YOB: 1990  MEDICAL RECORD NUMBER:  [id number]    The patient is being assessed for  Admission    I agree that One RN has performed a thorough Head to Toe Skin Assessment on the patient. ALL assessment sites listed below have been assessed. Areas assessed by both nurses:    Head, Face, Ears, Shoulders, Back, Chest, Arms, Elbows, Hands, Sacrum. Buttock, Coccyx, Ischium, and Legs. Feet and Heels        Does the Patient have a Wound?  No noted wound(s)       Nj Prevention initiated by RN: No   Wound Care Orders initiated by RN: No    Pressure Injury (Stage 3,4, Unstageable, DTI, NWPT, and Complex wounds) if present, place referral order by RN under : No    New and Established Ostomies, if present place, referral order under : No      Nurse 1 eSignature: Electronically signed by Laurence Santos LPN on 4/6/53 at 1:24 AM EST    **SHARE this note so that the co-signing nurse can place an eSignature**    Nurse 2 eSignature: Electronically signed by Hayley Dunn LPN on 1/4/56 at 3:25 AM EST

## 2023-03-08 NOTE — CONSULTS
[address] [facility]  consulted by MARQUIS Bolton for monitoring and adjustment. Indication for treatment: Vancomycin indication: SSTI with risk factors   Goal trough: Trough Goal: 10-15 mcg/mL  AUC/CRISTA: <500    Risk Factors for MRSA Identified:   Purulent and/or complicated SSTI    Pertinent Laboratory Values:   Temp Readings from Last 3 Encounters:   03/08/23 98.6 °F (37 °C) (Oral)   03/04/23 98.2 °F (36.8 °C) (Oral)   02/15/23 98.3 °F (36.8 °C) (Oral)     Recent Labs     03/07/23  1007   WBC 9.7       Recent Labs     03/07/23  1007   BUN 11   CREATININE 0.8       Estimated Creatinine Clearance: 116 mL/min (based on SCr of 0.8 mg/dL). Intake/Output Summary (Last 24 hours) at 3/8/2023 1358  Last data filed at 3/8/2023 1339  Gross per 24 hour   Intake 810 ml   Output 0 ml   Net 810 ml       Pertinent Cultures:   Date    Source    Results  3/7       Blood    NGTD x24h    Vancomycin level:   TROUGH:  No results for input(s): VANCOTROUGH in the last 72 hours. RANDOM:  No results for input(s): VANCORANDOM in the last 72 hours. Assessment:  HPI: Enedelia Mayers is a 35 y.o. female with a past medical history of hypertension, asthma who presents with Cellulitis of right foot (failed outpatient Bactrim). No sign of underlying osteomyelitis. SCr, BUN, and urine output: SCR appears normal @0.8 (baseline unknown),   Day(s) of therapy: 2 of 7  Vancomycin concentration:   3/8 - random level was missed  3/9 - random @ 0600    Plan:  The patient received vancomycin 2000mg x 1 dose, then followed by Vancomycin 1250mg IVPB every 12 hours for a predicted AUC of 461 at steady state. Random level was missed today; will re-order for tomorrow AM  Pharmacy will continue to monitor patient and adjust therapy as indicated    Deacon 3 3/9 @06:00    Thank you for the consult.   Sherlyn Gomez [facility]  3/8/2023 1:58 PM

## 2023-03-09 LAB
ANION GAP SERPL CALCULATED.3IONS-SCNC: 10 MMOL/L (ref 4–16)
ANION GAP SERPL CALCULATED.3IONS-SCNC: 15 MMOL/L (ref 4–16)
BASOPHILS ABSOLUTE: 0 K/CU MM
BASOPHILS RELATIVE PERCENT: 0.3 % (ref 0–1)
BUN SERPL-MCNC: 10 MG/DL (ref 6–23)
BUN SERPL-MCNC: 11 MG/DL (ref 6–23)
CALCIUM SERPL-MCNC: 9.2 MG/DL (ref 8.3–10.6)
CALCIUM SERPL-MCNC: 9.4 MG/DL (ref 8.3–10.6)
CHLORIDE BLD-SCNC: 102 MMOL/L (ref 99–110)
CHLORIDE BLD-SCNC: 99 MMOL/L (ref 99–110)
CO2: 22 MMOL/L (ref 21–32)
CO2: 25 MMOL/L (ref 21–32)
CREAT SERPL-MCNC: 0.6 MG/DL (ref 0.6–1.1)
CREAT SERPL-MCNC: 0.6 MG/DL (ref 0.6–1.1)
CREAT SERPL-MCNC: 0.7 MG/DL (ref 0.6–1.1)
DIFFERENTIAL TYPE: ABNORMAL
DOSE AMOUNT: NORMAL
DOSE AMOUNT: NORMAL
DOSE TIME: NORMAL
DOSE TIME: NORMAL
EOSINOPHILS ABSOLUTE: 0.1 K/CU MM
EOSINOPHILS RELATIVE PERCENT: 0.5 % (ref 0–3)
GFR SERPL CREATININE-BSD FRML MDRD: >60 ML/MIN/1.73M2
GLUCOSE SERPL-MCNC: 103 MG/DL (ref 70–99)
GLUCOSE SERPL-MCNC: 125 MG/DL (ref 70–99)
HCT VFR BLD CALC: 40.7 % (ref 37–47)
HEMOGLOBIN: 13.4 GM/DL (ref 12.5–16)
HIGH SENSITIVE C-REACTIVE PROTEIN: 12.8 MG/L (ref 0–5)
IMMATURE NEUTROPHIL %: 0.5 % (ref 0–0.43)
LYMPHOCYTES ABSOLUTE: 2.5 K/CU MM
LYMPHOCYTES RELATIVE PERCENT: 22.5 % (ref 24–44)
MCH RBC QN AUTO: 29.8 PG (ref 27–31)
MCHC RBC AUTO-ENTMCNC: 32.9 % (ref 32–36)
MCV RBC AUTO: 90.4 FL (ref 78–100)
MONOCYTES ABSOLUTE: 1 K/CU MM
MONOCYTES RELATIVE PERCENT: 8.6 % (ref 0–4)
NUCLEATED RBC %: 0 %
PDW BLD-RTO: 12.3 % (ref 11.7–14.9)
PLATELET # BLD: 270 K/CU MM (ref 140–440)
PMV BLD AUTO: 10 FL (ref 7.5–11.1)
POTASSIUM SERPL-SCNC: 4.3 MMOL/L (ref 3.5–5.1)
POTASSIUM SERPL-SCNC: 4.4 MMOL/L (ref 3.5–5.1)
RBC # BLD: 4.5 M/CU MM (ref 4.2–5.4)
SEGMENTED NEUTROPHILS ABSOLUTE COUNT: 7.5 K/CU MM
SEGMENTED NEUTROPHILS RELATIVE PERCENT: 67.6 % (ref 36–66)
SODIUM BLD-SCNC: 136 MMOL/L (ref 135–145)
SODIUM BLD-SCNC: 137 MMOL/L (ref 135–145)
TOTAL IMMATURE NEUTOROPHIL: 0.05 K/CU MM
TOTAL NUCLEATED RBC: 0 K/CU MM
VANCOMYCIN RANDOM: 8.6 UG/ML
VANCOMYCIN RANDOM: <4 UG/ML
WBC # BLD: 11.1 K/CU MM (ref 4–10.5)

## 2023-03-09 PROCEDURE — 80048 BASIC METABOLIC PNL TOTAL CA: CPT

## 2023-03-09 PROCEDURE — 2580000003 HC RX 258: Performed by: SURGERY

## 2023-03-09 PROCEDURE — 6370000000 HC RX 637 (ALT 250 FOR IP): Performed by: SURGERY

## 2023-03-09 PROCEDURE — 1200000000 HC SEMI PRIVATE

## 2023-03-09 PROCEDURE — 6370000000 HC RX 637 (ALT 250 FOR IP): Performed by: INTERNAL MEDICINE

## 2023-03-09 PROCEDURE — 6360000002 HC RX W HCPCS: Performed by: SURGERY

## 2023-03-09 PROCEDURE — 80202 ASSAY OF VANCOMYCIN: CPT

## 2023-03-09 PROCEDURE — 36415 COLL VENOUS BLD VENIPUNCTURE: CPT

## 2023-03-09 PROCEDURE — 85025 COMPLETE CBC W/AUTO DIFF WBC: CPT

## 2023-03-09 PROCEDURE — 82565 ASSAY OF CREATININE: CPT

## 2023-03-09 PROCEDURE — 94761 N-INVAS EAR/PLS OXIMETRY MLT: CPT

## 2023-03-09 PROCEDURE — 86141 C-REACTIVE PROTEIN HS: CPT

## 2023-03-09 RX ORDER — METRONIDAZOLE 250 MG/1
500 TABLET ORAL EVERY 8 HOURS SCHEDULED
Status: DISCONTINUED | OUTPATIENT
Start: 2023-03-09 | End: 2023-03-13 | Stop reason: HOSPADM

## 2023-03-09 RX ADMIN — ENOXAPARIN SODIUM 40 MG: 100 INJECTION SUBCUTANEOUS at 09:00

## 2023-03-09 RX ADMIN — METRONIDAZOLE 500 MG: 250 TABLET ORAL at 08:59

## 2023-03-09 RX ADMIN — MORPHINE SULFATE 2 MG: 2 INJECTION, SOLUTION INTRAMUSCULAR; INTRAVENOUS at 09:01

## 2023-03-09 RX ADMIN — POLYETHYLENE GLYCOL (3350) 17 G: 17 POWDER, FOR SOLUTION ORAL at 09:01

## 2023-03-09 RX ADMIN — SENNOSIDES AND DOCUSATE SODIUM 2 TABLET: 50; 8.6 TABLET ORAL at 08:59

## 2023-03-09 RX ADMIN — METRONIDAZOLE 500 MG: 250 TABLET ORAL at 21:00

## 2023-03-09 RX ADMIN — SODIUM CHLORIDE, POTASSIUM CHLORIDE, SODIUM LACTATE AND CALCIUM CHLORIDE: 600; 310; 30; 20 INJECTION, SOLUTION INTRAVENOUS at 05:13

## 2023-03-09 RX ADMIN — OXYCODONE AND ACETAMINOPHEN 1 TABLET: 5; 325 TABLET ORAL at 21:01

## 2023-03-09 RX ADMIN — MORPHINE SULFATE 2 MG: 2 INJECTION, SOLUTION INTRAMUSCULAR; INTRAVENOUS at 02:33

## 2023-03-09 RX ADMIN — OXYCODONE AND ACETAMINOPHEN 1 TABLET: 5; 325 TABLET ORAL at 05:17

## 2023-03-09 RX ADMIN — VANCOMYCIN HYDROCHLORIDE 1250 MG: 1.25 INJECTION, POWDER, LYOPHILIZED, FOR SOLUTION INTRAVENOUS at 06:55

## 2023-03-09 RX ADMIN — SODIUM CHLORIDE, PRESERVATIVE FREE 10 ML: 5 INJECTION INTRAVENOUS at 09:03

## 2023-03-09 RX ADMIN — AMLODIPINE BESYLATE 10 MG: 10 TABLET ORAL at 21:01

## 2023-03-09 RX ADMIN — CLONIDINE HYDROCHLORIDE 0.2 MG: 0.2 TABLET ORAL at 21:01

## 2023-03-09 RX ADMIN — VANCOMYCIN HYDROCHLORIDE 1250 MG: 1.25 INJECTION, POWDER, LYOPHILIZED, FOR SOLUTION INTRAVENOUS at 17:39

## 2023-03-09 RX ADMIN — CEFEPIME 2000 MG: 2 INJECTION, POWDER, FOR SOLUTION INTRAVENOUS at 16:56

## 2023-03-09 RX ADMIN — OXYCODONE AND ACETAMINOPHEN 1 TABLET: 5; 325 TABLET ORAL at 16:39

## 2023-03-09 RX ADMIN — SODIUM CHLORIDE, POTASSIUM CHLORIDE, SODIUM LACTATE AND CALCIUM CHLORIDE: 600; 310; 30; 20 INJECTION, SOLUTION INTRAVENOUS at 19:25

## 2023-03-09 RX ADMIN — PANTOPRAZOLE SODIUM 40 MG: 40 TABLET, DELAYED RELEASE ORAL at 05:17

## 2023-03-09 RX ADMIN — CEFEPIME 2000 MG: 2 INJECTION, POWDER, FOR SOLUTION INTRAVENOUS at 05:16

## 2023-03-09 RX ADMIN — MORPHINE SULFATE 2 MG: 2 INJECTION, SOLUTION INTRAMUSCULAR; INTRAVENOUS at 13:02

## 2023-03-09 RX ADMIN — HYDROXYZINE HYDROCHLORIDE 25 MG: 25 TABLET, FILM COATED ORAL at 21:01

## 2023-03-09 ASSESSMENT — PAIN DESCRIPTION - LOCATION
LOCATION: FOOT
LOCATION: FOOT;LEG
LOCATION: FOOT
LOCATION: FOOT

## 2023-03-09 ASSESSMENT — PAIN DESCRIPTION - DESCRIPTORS
DESCRIPTORS: ACHING
DESCRIPTORS: SHARP;THROBBING
DESCRIPTORS: ACHING;BURNING
DESCRIPTORS: BURNING;ACHING;STABBING
DESCRIPTORS: SHARP;THROBBING

## 2023-03-09 ASSESSMENT — PAIN SCALES - GENERAL
PAINLEVEL_OUTOF10: 8
PAINLEVEL_OUTOF10: 7
PAINLEVEL_OUTOF10: 7

## 2023-03-09 ASSESSMENT — PAIN - FUNCTIONAL ASSESSMENT
PAIN_FUNCTIONAL_ASSESSMENT: PREVENTS OR INTERFERES SOME ACTIVE ACTIVITIES AND ADLS
PAIN_FUNCTIONAL_ASSESSMENT: PREVENTS OR INTERFERES SOME ACTIVE ACTIVITIES AND ADLS
PAIN_FUNCTIONAL_ASSESSMENT: ACTIVITIES ARE NOT PREVENTED
PAIN_FUNCTIONAL_ASSESSMENT: PREVENTS OR INTERFERES SOME ACTIVE ACTIVITIES AND ADLS

## 2023-03-09 ASSESSMENT — PAIN DESCRIPTION - ORIENTATION
ORIENTATION: RIGHT;INNER
ORIENTATION: RIGHT
ORIENTATION: RIGHT
ORIENTATION: RIGHT;INNER
ORIENTATION: RIGHT
ORIENTATION: RIGHT

## 2023-03-09 ASSESSMENT — PAIN DESCRIPTION - ONSET: ONSET: AWAKENED FROM SLEEP

## 2023-03-09 ASSESSMENT — ENCOUNTER SYMPTOMS
WHEEZING: 0
BACK PAIN: 0
CONSTIPATION: 0
COUGH: 0
ABDOMINAL DISTENTION: 0
SHORTNESS OF BREATH: 1
SORE THROAT: 0
EYE DISCHARGE: 0
DIARRHEA: 0

## 2023-03-09 ASSESSMENT — PAIN DESCRIPTION - FREQUENCY: FREQUENCY: CONTINUOUS

## 2023-03-09 ASSESSMENT — PAIN DESCRIPTION - PAIN TYPE: TYPE: SURGICAL PAIN;CHRONIC PAIN

## 2023-03-09 NOTE — CONSULTS
Consult completed. Nexiva 20g 1.75 inch catheter inserted via ultrasound in patient's RFA. Brisk blood return noted and catheter flushes with ease. Patient tolerated well. Stephanie Page, primary LPN notified. Consult IV/PICC team if patient's needs change.

## 2023-03-09 NOTE — ED PROVIDER NOTES
Emergency Department Encounter  Location: [address] [place] [address] EMERGENCY DEPARTMENT    Patient: Matt Dorado  MRN: [id number]  : 1990  Date of evaluation: 3/4/2023  ED Provider: Margarita Davis DO    Chief Complaint:    Foot Pain (right)    [place]:  Matt Dorado is a 35 y.o. female that presents to the emergency department with concern for right foot pain. States it has gradually been worsening for the past two days, and is worse with any attempt to bear weight. She denies any trauma to the foot. Denies any pain or swelling in calf or thigh. Has been able to eat and drink per usual without vomiting. Denies measured fevers. Past Medical History:   Diagnosis Date    Asthma     Hypertension      History reviewed. No pertinent surgical history. History reviewed. No pertinent family history. Social History     Socioeconomic History    Marital status: Single     Spouse name: Not on file    Number of children: Not on file    Years of education: Not on file    Highest education level: Not on file   Occupational History    Not on file   Tobacco Use    Smoking status: Every Day     Packs/day: 0.75     Types: Cigarettes    Smokeless tobacco: Never   Substance and Sexual Activity    Alcohol use: Not Currently    Drug use: Not Currently    Sexual activity: Not on file   Other Topics Concern    Not on file   Social History Narrative    Not on file     Social Determinants of Health     Financial Resource Strain: Not on file   Food Insecurity: Not on file   Transportation Needs: Not on file   Physical Activity: Not on file   Stress: Not on file   Social Connections: Not on file   Intimate Partner Violence: Not on file   Housing Stability: Not on file     No current facility-administered medications for this encounter. No current outpatient medications on file.      Facility-Administered Medications Ordered in Other Encounters   Medication Dose Route Frequency Provider Last Rate Last Admin    lactated ringers IV soln infusion   IntraVENous Continuous Forest Cousins, DO 50 mL/hr at 03/08/23 0953 New Bag at 03/08/23 0953    droperidol (INAPSINE) injection 0.625 mg  0.625 mg IntraVENous Q10 Min PRN Forest Cousins, DO        ipratropium-albuterol (DUONEB) nebulizer solution 1 ampule  1 ampule Inhalation Once PRN Forest Cousins, DO        morphine (PF) injection 2 mg  2 mg IntraVENous Q4H PRN Forest Cousins, DO   2 mg at 03/08/23 1728    pantoprazole (PROTONIX) tablet 40 mg  40 mg Oral QAM AC Jake Lambert, DO   40 mg at 03/08/23 1115    polyethylene glycol (GLYCOLAX) packet 17 g  17 g Oral Daily Jake Lambert, DO   17 g at 03/08/23 1022    sennosides-docusate sodium (SENOKOT-S) 8.6-50 MG tablet 2 tablet  2 tablet Oral Daily Forest Cousins, DO   2 tablet at 03/08/23 1115    amLODIPine (NORVASC) tablet 10 mg  10 mg Oral Daily Forest Cousins, DO   10 mg at 03/08/23 2205    cloNIDine (CATAPRES) tablet 0.2 mg  0.2 mg Oral Daily Forest Cousins, DO   0.2 mg at 03/08/23 2205    hydrOXYzine HCl (ATARAX) tablet 25 mg  25 mg Oral Daily PRN Forest Cousins, DO   25 mg at 03/08/23 2205    VORTIoxetine (TRINTELLIX) tablet 10 mg  10 mg Oral Daily Jake Lambert, DO        sodium chloride flush 0.9 % injection 5-40 mL  5-40 mL IntraVENous 2 times per day Forest Cousins, DO   10 mL at 03/08/23 0955    sodium chloride flush 0.9 % injection 5-40 mL  5-40 mL IntraVENous PRN Forest Cousins, DO        0.9 % sodium chloride infusion   IntraVENous PRN Forest Cousins, DO        enoxaparin (LOVENOX) injection 40 mg  40 mg SubCUTAneous Daily aJke Lambert, DO   40 mg at 03/08/23 0956    ondansetron (ZOFRAN-ODT) disintegrating tablet 4 mg  4 mg Oral Q8H PRN Forest Cousins, DO        Or    ondansetron (ZOFRAN) injection 4 mg  4 mg IntraVENous Q6H PRN Forest Cousins, DO        polyethylene glycol (GLYCOLAX) packet 17 g  17 g Oral Daily PRN Forest Cousins, DO        acetaminophen (TYLENOL) tablet 650 mg  650 mg Oral Q6H PRN Liane Bueno, DO        Or    acetaminophen (TYLENOL) suppository 650 mg  650 mg Rectal Q6H PRN Liane Bueno, DO        oxyCODONE-acetaminophen (PERCOCET) 5-325 MG per tablet 1 tablet  1 tablet Oral Q4H PRN Liane Pimentels, DO   1 tablet at 03/08/23 2205    sodium chloride flush 0.9 % injection 10 mL  10 mL IntraVENous PRN Liane Bueno, DO        vancomycin (VANCOCIN) 1,250 mg in sodium chloride 0.9 % 250 mL IVPB ([id number])  1,250 mg IntraVENous Q12H Liane Bueno, DO   Stopped at 03/08/23 1220    cefepime (MAXIPIME) 2,000 mg in sodium chloride 0.9 % 50 mL IVPB (mini-bag)  2,000 mg IntraVENous Q12H Liane Bueno, DO   Stopped at 03/08/23 1707     Allergies   Allergen Reactions    Adhesive Tape Itching       Nursing Notes Reviewed    Physical Exam:  ED Triage Vitals [03/04/23 0320]   Enc Vitals Group      BP (!) 151/85      Heart Rate 98      Resp 18      Temp 98.2 °F (36.8 °C)      Temp Source Oral      SpO2 100 %      Weight 215 lb (97.5 kg)      Height 5' 5\" (1.651 m)      Head Circumference       Peak Flow       Pain Score       Pain Loc       Pain Edu? Excl. in [address]? GENERAL APPEARANCE: Awake and alert. Cooperative. No acute distress. Pleasant, well appearing. HEAD: Normocephalic. Atraumatic. EYES: EOM's grossly intact. Sclera anicteric. ENT: Tolerates saliva. No trismus. NECK: Supple. Trachea midline. CARDIO: RRR. Symmetric lower extremity pulses. LUNGS: Respirations unlabored. CTAB. ABDOMEN: Soft. Non-distended. Non-tender. EXTREMITIES: No acute deformities. Extensive calluses on both feet with some cracking noted. Right , mildly edematous with visible erythema across dorsum of the foot. Does not extend beyond the ankle or involve the sole of the foot. No focal induration. No posterior calf or thigh tenderness/edema/asymmetry. SKIN: Warm and dry. NEUROLOGICAL: No gross facial drooping. Moves all 4 extremities spontaneously.    PSYCHIATRIC: Normal mood. Radiographs (if obtained):  [] The following radiograph was interpreted by myself in the absence of a radiologist:  [x] Radiologist's Report reviewed at time of ED visit:  XR FOOT RIGHT (2 VIEWS)    Result Date: 3/4/2023  EXAMINATION: TWO XRAY VIEWS OF THE RIGHT FOOT 3/4/2023 5:27 am COMPARISON: None. HISTORY: ORDERING SYSTEM PROVIDED HISTORY: pain TECHNOLOGIST PROVIDED HISTORY: Reason for exam:->pain Reason for Exam: pain FINDINGS: No acute fracture or dislocation at the right foot. There is no cortical erosion or periosteal reaction. Some general swelling of the mid to distal foot is suggested. No radiopaque foreign bodies or soft tissue emphysema. No fracture, cortical erosion, or periosteal reaction. Swelling of the mid to distal foot is suggested but without radiopaque foreign body or soft tissue emphysema. XR FOOT RIGHT (MIN 3 VIEWS)    Result Date: 3/7/2023  EXAMINATION: THREE XRAY VIEWS OF THE RIGHT FOOT 3/7/2023 10:17 am COMPARISON: 03/04/2023. HISTORY: ORDERING SYSTEM PROVIDED HISTORY: swelling TECHNOLOGIST PROVIDED HISTORY: Reason for exam:->swelling Reason for Exam: swelling FINDINGS: There is diffuse soft tissue swelling along the right foot which is concerning for underlying infection. There is radiopaque material noted along the 1st toe extending into the 1st and 2nd toe web space. No fracture or osseous destructive lesion. No periosteal reaction. Minimal degenerative changes are noted in the midfoot. 1. Diffuse soft tissue swelling along the right foot, concerning for infection. No underlying fracture or evidence of osteomyelitis. 2. Radiopaque material is noted along the 1st toe extending into the 1st and 2nd web space, of uncertain etiology.      CT FOOT RIGHT W CONTRAST    Result Date: 3/7/2023  EXAMINATION: CT OF THE RIGHT FOOT WITH CONTRAST 3/7/2023 1:42 pm TECHNIQUE: CT of the right foot was performed with the administration of intravenous contrast. Multiplanar reformatted images are provided for review. Automated exposure control, iterative reconstruction, and/or weight based adjustment of the mA/kV was utilized to reduce the radiation dose to as low as reasonably achievable. COMPARISON: Radiographs 03/04/2023 and 03/07/2023. HISTORY ORDERING SYSTEM PROVIDED HISTORY: ro osteomyeltits TECHNOLOGIST PROVIDED HISTORY: Reason for exam:->ro osteomyeltits Decision Support Exception - unselect if not a suspected or confirmed emergency medical condition->Emergency Medical Condition (MA) Reason for Exam: R/O osteomyelitis FINDINGS: Bones: No evidence of acute fracture or dislocation. No aggressive appearing osseous abnormality or periostitis. Soft Tissue: Lobulated fluid collection along the dorsal 1st and 2nd webspace with adjacent hyperdense material, concerning for infection, measuring approximately 2.0 x 0.6 x 2.9 cm. Diffuse soft tissue stranding and skin thickening, nonspecific. No discrete soft tissue gas. No discrete fully retracted tendon tear. Focal soft tissue ulceration along the dorsal margin of the 1st distal phalanx. Joint: No significant degenerative changes. No osseous erosions. Peripherally enhancing collection between the 1st and 2nd digits is concerning for abscess. Adjacent hyperdense material is nonspecific. Diffuse soft tissue stranding and skin thickening is concerning for cellulitis. No discrete CT evidence of osteomyelitis. Recommend further evaluation with MRI. CC/HPI Summary, DDx, ED Course, and Reassessment: Exam is concerning for cellulitis, potentially from calluses and associated defects in skin. Patient's xray is reassuring with no air in tissue or bony defects to suggest osteomyelitis.       Patient was given the following medications:  Medications   HYDROcodone-acetaminophen (NORCO) 5-325 MG per tablet 1 tablet (1 tablet Oral Given 3/4/23 0611)   sulfamethoxazole-trimethoprim (BACTRIM DS;SEPTRA DS) 800-160 MG per tablet 1 tablet (1 tablet Oral Given 3/4/23 0611)       Chronic conditions affecting care: HTN    Disposition Considerations (tests considered but not done, Shared Decision Making, Pt Expectation of Test or Tx.):   Patient is well appearing, tolerating PO. Denies history of diabetes. She is given first dose of antibiotics in the ED as well as crutches to relieve discomfort with weight bearing. I think patient is appropriate for outpatient management. Patient is given instructions regarding symptomatic care at home as well as return precautions. To call PCP for follow up in 2-3 days. She is also given referral to podiatry. Patient verbalizes understanding of all instructions and is comfortable with the plan of care. I am the Primary Clinician of Record. Final Impression:  1.  Cellulitis of right foot      DISPOSITION Decision To Discharge 03/04/2023 06:37:43 AM      Patient referred to:  [address]  [address] [facility] [address] [phone]  Schedule an appointment as soon as possible for a visit in 2 days      Abisai Ulrich, [address]  [phone]    Call in 2 days      [facility] Emergency Department  [place] [id number]  [phone]    If symptoms worsen    Discharge medications:  Discharge Medication List as of 3/4/2023  6:28 AM        START taking these medications    Details   sulfamethoxazole-trimethoprim (BACTRIM DS) 800-160 MG per tablet Take 1 tablet by mouth 2 times daily for 10 days, Disp-20 tablet, R-0Normal           (Please note that portions of this note may have been completed with a voice recognition program. Efforts were made to edit the dictations but occasionally words are mis-transcribed.)    DO Oleg Cisneros DO  03/08/23 4975

## 2023-03-09 NOTE — PROGRESS NOTES
V2.0  [facility] Hospitalist Progress Note      Name:  Rosy Abebe /Age/Sex: 1990  (35 y.o. female)   MRN & CSN:  [id number] & [id number] Encounter Date/Time: 3/9/2023 10:12 AM EST    Location:  [address] [place], [place] [address] PCP: Margie Garg, [address] [facility] Day: 3    Assessment and Plan:   Rosy Abebe is a 35 y.o. female with pmh of hypertension, asthma  who presents with Cellulitis of right foot      Plan:  Right foot cellulitis and abscess   - failed outpatient bactrim   - afebrile without leukocytosis, HDS  - XR R foot with diffuse soft tissue swelling along the right foot concerning for infection, no underlying osteomyelitis  - CT R foot concerning for possible abscess between first and second digit   - blood cultures drawn in ED,   - CRP elevated but Pro-Trey negative  -S/p I&D of right foot abscess 3/8.  -Follow-up cultures from the OR  -Continue vancomycin and cefepime. Add metronidazole for anaerobic coverage. Epigastric abdominal pain  -Resolved    Constipation   - start bowel regimen     Asthma   -No signs or symptoms of acute exacerbation    Hypertension  -Continue home clonidine and amlodipine    Diet ADULT DIET; Regular   DVT Prophylaxis [x] Lovenox, []  Heparin, [] SCDs, [] Ambulation,  [] Eliquis, [] Xarelto  [] Coumadin   Code Status Full Code   Disposition From: Home  Expected Disposition: Home  Estimated Date of Discharge: 2-3 days  Patient requires continued admission due to right foot abscess   Surrogate Decision Maker/ POA      Subjective:     Chief Complaint: Foot Injury (States that she injured her foot Friday. Has had increased swelling and pain moving up her foot and into her ankle. Pt family Dr believes she may need IV antibiotics)       Seen and evaluated at bedside. Complaining of mild pain in  the right leg. Had questions regarding antibiotics and going home. Clarified she will have to wait for final cultures. She is denying any other complaints currently.       Review of Systems:    Review of Systems   Constitutional:  Positive for activity change, appetite change and fatigue. Negative for diaphoresis. HENT:  Negative for congestion and sore throat. Eyes:  Negative for discharge and visual disturbance. Respiratory:  Positive for shortness of breath. Negative for cough and wheezing. Cardiovascular:  Negative for chest pain, palpitations and leg swelling. Gastrointestinal:  Negative for abdominal distention, constipation and diarrhea. Genitourinary:  Negative for difficulty urinating and hematuria. Musculoskeletal:  Positive for arthralgias. Negative for back pain and gait problem. Skin:  Positive for wound. Negative for rash. Neurological:  Negative for dizziness, syncope and speech difficulty. Hematological:  Negative for adenopathy. Psychiatric/Behavioral:  Negative for agitation and confusion. All other systems reviewed and are negative. Objective: Intake/Output Summary (Last 24 hours) at 3/9/2023 1253  Last data filed at 3/9/2023 1035  Gross per 24 hour   Intake 10 ml   Output 1200 ml   Net -1190 ml          Vitals:   Vitals:    03/09/23 0845   BP: 124/75   Pulse: 91   Resp: 20   Temp: 98.8 °F (37.1 °C)   SpO2: 98%       Physical Exam:   Physical Exam  Vitals and nursing note reviewed. Constitutional:       General: She is not in acute distress. Appearance: She is obese. She is not ill-appearing. HENT:      Head: Normocephalic and atraumatic. Nose: Nose normal.      Mouth/Throat:      Mouth: Mucous membranes are moist.   Eyes:      Extraocular Movements: Extraocular movements intact. Pupils: Pupils are equal, round, and reactive to light. Cardiovascular:      Rate and Rhythm: Normal rate and regular rhythm. Pulses: Normal pulses. Heart sounds: Normal heart sounds. Pulmonary:      Effort: Pulmonary effort is normal.      Breath sounds: Normal breath sounds. Abdominal:      Palpations: Abdomen is soft. Musculoskeletal:         General: Normal range of motion. Cervical back: Normal range of motion. Comments: Right foot abscess post incision and drainage, covered with dressing, C/D/I   Skin:     General: Skin is warm. Capillary Refill: Capillary refill takes less than 2 seconds. Neurological:      General: No focal deficit present. Mental Status: She is alert and oriented to person, place, and time.    Psychiatric:         Mood and Affect: Mood normal.         Behavior: Behavior normal.          Medications:   Medications:    metroNIDAZOLE  500 mg Oral 3 times per day    pantoprazole  40 mg Oral QAM AC    polyethylene glycol  17 g Oral Daily    sennosides-docusate sodium  2 tablet Oral Daily    amLODIPine  10 mg Oral Daily    cloNIDine  0.2 mg Oral Daily    VORTIoxetine  10 mg Oral Daily    sodium chloride flush  5-40 mL IntraVENous 2 times per day    enoxaparin  40 mg SubCUTAneous Daily    vancomycin  1,250 mg IntraVENous Q12H    cefepime  2,000 mg IntraVENous Q12H      Infusions:    lactated ringers IV soln 50 mL/hr at 03/09/23 0513    sodium chloride       PRN Meds: droperidol, 0.625 mg, Q10 Min PRN  morphine, 2 mg, Q4H PRN  hydrOXYzine HCl, 25 mg, Daily PRN  sodium chloride flush, 5-40 mL, PRN  sodium chloride, , PRN  ondansetron, 4 mg, Q8H PRN   Or  ondansetron, 4 mg, Q6H PRN  polyethylene glycol, 17 g, Daily PRN  acetaminophen, 650 mg, Q6H PRN   Or  acetaminophen, 650 mg, Q6H PRN  oxyCODONE-acetaminophen, 1 tablet, Q4H PRN  sodium chloride flush, 10 mL, PRN      Labs      Recent Results (from the past 24 hour(s))   Basic Metabolic Panel w/ Reflex to MG    Collection Time: 03/08/23 11:13 PM   Result Value Ref Range    Sodium 136 135 - 145 MMOL/L    Potassium 4.3 3.5 - 5.1 MMOL/L    Chloride 99 99 - 110 mMol/L    CO2 22 21 - 32 MMOL/L    Anion Gap 15 4 - 16    BUN 11 6 - 23 MG/DL    Creatinine 0.7 0.6 - 1.1 MG/DL    Est, Glom Filt Rate >60 >60 mL/min/1.73m2    Glucose 125 (H) 70 - 99 MG/DL    Calcium 9.4 8.3 - 10.6 MG/DL   CBC with Auto Differential    Collection Time: 03/08/23 11:13 PM   Result Value Ref Range    WBC 13.2 (H) 4.0 - 10.5 K/CU MM    RBC 4.66 4.2 - 5.4 M/CU MM    Hemoglobin 14.1 12.5 - 16.0 GM/DL    Hematocrit 41.9 37 - 47 %    MCV 89.9 78 - 100 FL    MCH 30.3 27 - 31 PG    MCHC 33.7 32.0 - 36.0 %    RDW 12.1 11.7 - 14.9 %    Platelets 095 134 - 914 K/CU MM    MPV 10.4 7.5 - 11.1 FL    Differential Type AUTOMATED DIFFERENTIAL     Segs Relative 82.5 (H) 36 - 66 %    Lymphocytes % 10.1 (L) 24 - 44 %    Monocytes % 7.0 (H) 0 - 4 %    Eosinophils % 0.0 0 - 3 %    Basophils % 0.2 0 - 1 %    Segs Absolute 10.9 K/CU MM    Lymphocytes Absolute 1.3 K/CU MM    Monocytes Absolute 0.9 K/CU MM    Eosinophils Absolute 0.0 K/CU MM    Basophils Absolute 0.0 K/CU MM    Nucleated RBC % 0.0 %    Total Nucleated RBC 0.0 K/CU MM    Total Immature Neutrophil 0.03 K/CU MM    Immature Neutrophil % 0.2 0 - 0.43 %   Vancomycin Level, Random    Collection Time: 03/08/23 11:13 PM   Result Value Ref Range    Vancomycin Rm 8.6 UG/ML    DOSE AMOUNT DOSE AMT. GIVEN - 1250mg     DOSE TIME DOSE TIME GIVEN - 2300    Creatinine    Collection Time: 03/09/23  3:38 AM   Result Value Ref Range    Creatinine 0.6 0.6 - 1.1 MG/DL    Est, Glom Filt Rate >60 >60 mL/min/1.73m2   Vancomycin Level, Random    Collection Time: 03/09/23  3:38 AM   Result Value Ref Range    Vancomycin Rm <4.0 UG/ML    DOSE AMOUNT DOSE AMT.  GIVEN - 1250mg     DOSE TIME DOSE TIME GIVEN - 1100, 2300    CBC with Auto Differential    Collection Time: 03/09/23 10:40 AM   Result Value Ref Range    WBC 11.1 (H) 4.0 - 10.5 K/CU MM    RBC 4.50 4.2 - 5.4 M/CU MM    Hemoglobin 13.4 12.5 - 16.0 GM/DL    Hematocrit 40.7 37 - 47 %    MCV 90.4 78 - 100 FL    MCH 29.8 27 - 31 PG    MCHC 32.9 32.0 - 36.0 %    RDW 12.3 11.7 - 14.9 %    Platelets 174 991 - 860 K/CU MM    MPV 10.0 7.5 - 11.1 FL    Differential Type AUTOMATED DIFFERENTIAL     Segs Relative 67.6 (H) 36 - 66 %    Lymphocytes % 22.5 (L) 24 - 44 %    Monocytes % 8.6 (H) 0 - 4 %    Eosinophils % 0.5 0 - 3 %    Basophils % 0.3 0 - 1 %    Segs Absolute 7.5 K/CU MM    Lymphocytes Absolute 2.5 K/CU MM    Monocytes Absolute 1.0 K/CU MM    Eosinophils Absolute 0.1 K/CU MM    Basophils Absolute 0.0 K/CU MM    Nucleated RBC % 0.0 %    Total Nucleated RBC 0.0 K/CU MM    Total Immature Neutrophil 0.05 K/CU MM    Immature Neutrophil % 0.5 (H) 0 - 0.43 %   Basic Metabolic Panel w/ Reflex to MG    Collection Time: 03/09/23 10:40 AM   Result Value Ref Range    Sodium 137 135 - 145 MMOL/L    Potassium 4.4 3.5 - 5.1 MMOL/L    Chloride 102 99 - 110 mMol/L    CO2 25 21 - 32 MMOL/L    Anion Gap 10 4 - 16    BUN 10 6 - 23 MG/DL    Creatinine 0.6 0.6 - 1.1 MG/DL    Est, Glom Filt Rate >60 >60 mL/min/1.73m2    Glucose 103 (H) 70 - 99 MG/DL    Calcium 9.2 8.3 - 10.6 MG/DL   High sensitivity CRP    Collection Time: 03/09/23 10:40 AM   Result Value Ref Range    CRP, High Sensitivity 12.8 (H) 0.0 - 5.0 mg/L        Imaging/Diagnostics Last 24 Hours   XR FOOT RIGHT (MIN 3 VIEWS)    Result Date: 3/7/2023  EXAMINATION: THREE XRAY VIEWS OF THE RIGHT FOOT 3/7/2023 10:17 am COMPARISON: 03/04/2023. HISTORY: ORDERING SYSTEM PROVIDED HISTORY: swelling TECHNOLOGIST PROVIDED HISTORY: Reason for exam:->swelling Reason for Exam: swelling FINDINGS: There is diffuse soft tissue swelling along the right foot which is concerning for underlying infection. There is radiopaque material noted along the 1st toe extending into the 1st and 2nd toe web space. No fracture or osseous destructive lesion. No periosteal reaction. Minimal degenerative changes are noted in the midfoot. 1. Diffuse soft tissue swelling along the right foot, concerning for infection. No underlying fracture or evidence of osteomyelitis. 2. Radiopaque material is noted along the 1st toe extending into the 1st and 2nd web space, of uncertain etiology.      CT FOOT RIGHT W CONTRAST    Result Date: 3/7/2023  EXAMINATION: CT OF THE RIGHT FOOT WITH CONTRAST 3/7/2023 1:42 pm TECHNIQUE: CT of the right foot was performed with the administration of intravenous contrast.  Multiplanar reformatted images are provided for review. Automated exposure control, iterative reconstruction, and/or weight based adjustment of the mA/kV was utilized to reduce the radiation dose to as low as reasonably achievable. COMPARISON: Radiographs 03/04/2023 and 03/07/2023. HISTORY ORDERING SYSTEM PROVIDED HISTORY: ro osteomyeltits TECHNOLOGIST PROVIDED HISTORY: Reason for exam:->ro osteomyeltits Decision Support Exception - unselect if not a suspected or confirmed emergency medical condition->Emergency Medical Condition (MA) Reason for Exam: R/O osteomyelitis FINDINGS: Bones: No evidence of acute fracture or dislocation. No aggressive appearing osseous abnormality or periostitis. Soft Tissue: Lobulated fluid collection along the dorsal 1st and 2nd webspace with adjacent hyperdense material, concerning for infection, measuring approximately 2.0 x 0.6 x 2.9 cm.  Diffuse soft tissue stranding and skin thickening, nonspecific.  No discrete soft tissue gas.  No discrete fully retracted tendon tear.  Focal soft tissue ulceration along the dorsal margin of the 1st distal phalanx. Joint: No significant degenerative changes. No osseous erosions.     Peripherally enhancing collection between the 1st and 2nd digits is concerning for abscess.  Adjacent hyperdense material is nonspecific. Diffuse soft tissue stranding and skin thickening is concerning for cellulitis. No discrete CT evidence of osteomyelitis.  Recommend further evaluation with MRI.       Electronically signed by Emily Carlin MD on 3/9/2023 at 12:53 PM

## 2023-03-09 NOTE — PROGRESS NOTES
GENERAL SURGERY PROGRESS NOTE    Candy Hanley is a 35 y.o. female status post I&D of right foot abscess. Subjective:  Pain improved. No acute events overnight. Objective:    Vitals: VITALS:  /75   Pulse 91   Temp 98.8 °F (37.1 °C) (Oral)   Resp 20   Ht 5' 5\" (1.651 m)   Wt 215 lb (97.5 kg)   LMP 02/15/2023 (Exact Date)   SpO2 98%   BMI 35.78 kg/m²     I/O: 03/08 0701 - 03/09 0700  In: 570 [I.V.:520]  Out: 700 [Urine:700]    Labs/Imaging Results:   Lab Results   Component Value Date/Time     03/09/2023 10:40 AM    K 4.4 03/09/2023 10:40 AM     03/09/2023 10:40 AM    CO2 25 03/09/2023 10:40 AM    BUN 10 03/09/2023 10:40 AM    CREATININE 0.6 03/09/2023 10:40 AM    GLUCOSE 103 03/09/2023 10:40 AM    CALCIUM 9.2 03/09/2023 10:40 AM      Lab Results   Component Value Date    WBC 11.1 (H) 03/09/2023    HGB 13.4 03/09/2023    HCT 40.7 03/09/2023    MCV 90.4 03/09/2023     03/09/2023          IV Fluids:   lactated ringers IV soln Last Rate: 50 mL/hr at 03/09/23 0513    sodium chloride    Scheduled Meds:   metroNIDAZOLE, 500 mg, Oral, 3 times per day    pantoprazole, 40 mg, Oral, QAM AC    polyethylene glycol, 17 g, Oral, Daily    sennosides-docusate sodium, 2 tablet, Oral, Daily    amLODIPine, 10 mg, Oral, Daily    cloNIDine, 0.2 mg, Oral, Daily    VORTIoxetine, 10 mg, Oral, Daily    sodium chloride flush, 5-40 mL, IntraVENous, 2 times per day    enoxaparin, 40 mg, SubCUTAneous, Daily    vancomycin, 1,250 mg, IntraVENous, Q12H    cefepime, 2,000 mg, IntraVENous, Q12H    Physical Exam:  General: A&O x 3, no distress. HEENT: Anicteric sclerae, MMM. Extremities: No edema bilat LE. Abdomen: Soft, nondistended, nontender   Right foot with improved erythema/edema. Dressing in place. Assessment and Plan:     Patient Active Problem List:     Cellulitis of right foot     Abscess of right foot      Principal Problem:    Cellulitis of right foot  Plan:    Active Problems: Abscess of right foot  Plan:      Postop day 1 right foot abscess I&D  Continue antibiotics  We will change dressing tomorrow and okay to discharge after that from surgery standpoint on antibiotics per primary.   We will refer to wound care for follow-up as outpatient         Aisha Fernandez DO

## 2023-03-09 NOTE — PLAN OF CARE
Problem: Discharge Planning  Goal: Discharge to home or other facility with appropriate resources  Outcome: Progressing  Flowsheets (Taken 3/9/2023 0900)  Discharge to home or other facility with appropriate resources:   Identify barriers to discharge with patient and caregiver   Arrange for needed discharge resources and transportation as appropriate   Identify discharge learning needs (meds, wound care, etc)   Arrange for interpreters to assist at discharge as needed   Refer to discharge planning if patient needs post-hospital services based on physician order or complex needs related to functional status, cognitive ability or social support system     Problem: Pain  Goal: Verbalizes/displays adequate comfort level or baseline comfort level  Outcome: Progressing  Flowsheets (Taken 3/9/2023 0845)  Verbalizes/displays adequate comfort level or baseline comfort level:   Encourage patient to monitor pain and request assistance   Assess pain using appropriate pain scale   Administer analgesics based on type and severity of pain and evaluate response   Implement non-pharmacological measures as appropriate and evaluate response   Consider cultural and social influences on pain and pain management   Notify Licensed Independent Practitioner if interventions unsuccessful or patient reports new pain     Problem: Safety - Adult  Goal: Free from fall injury  Outcome: Progressing

## 2023-03-09 NOTE — CONSULTS
[address] [facility]  consulted by Dr. Hailey Perez for monitoring and adjustment. Indication for treatment: Vancomycin indication: SSTI with risk factors   Goal trough: Trough Goal: 10-15 mcg/mL  AUC/CRISTA: <500    Risk Factors for MRSA Identified:   Purulent and/or complicated SSTI    Pertinent Laboratory Values:   Temp Readings from Last 3 Encounters:   03/09/23 98.8 °F (37.1 °C) (Oral)   03/04/23 98.2 °F (36.8 °C) (Oral)   02/15/23 98.3 °F (36.8 °C) (Oral)     Recent Labs     03/07/23 1007 03/08/23 2313   WBC 9.7 13.2*       Recent Labs     03/07/23 1007 03/08/23 2313 03/09/23  0338   BUN 11 11  --    CREATININE 0.8 0.7 0.6       Estimated Creatinine Clearance: 154 mL/min (based on SCr of 0.6 mg/dL). Intake/Output Summary (Last 24 hours) at 3/9/2023 1042  Last data filed at 3/9/2023 1035  Gross per 24 hour   Intake 10 ml   Output 1200 ml   Net -1190 ml         Pertinent Cultures:   Date    Source    Results  3/7       Blood    S. epidermidis (1/4 bottles)  3/8    Tissue/Wound   S. aureus      Vancomycin level:   TROUGH:  No results for input(s): VANCOTROUGH in the last 72 hours. RANDOM:    Recent Labs     03/08/23 2313 03/09/23 0338   VANCORANDOM 8.6 <4.0       Assessment:  HPI: Gilford Kenner is a 35 y.o. female with a past medical history of hypertension, asthma who presents with Cellulitis of right foot (failed outpatient Bactrim). No sign of underlying osteomyelitis. SCr, BUN, and urine output: WNL  Day(s) of therapy: 3 of 7  Vancomycin concentration:   3/8 - 8.6 mg/dL ~13 hours post dose  3/9 - <4.0 - no additional doses given between levels    Plan:  Continue vancomycin 1250mg IVPB every 12 hours for a predicted AUC of 408 at steady state. Pharmacy will continue to monitor patient and adjust therapy as indicated    Deacon 3 3/11/23 @06:00    Thank you for the consult.   Tay LUCAS, [facility]  3/9/2023 10:42 AM

## 2023-03-10 LAB
CULTURE: ABNORMAL
CULTURE: ABNORMAL
Lab: ABNORMAL
SPECIMEN: ABNORMAL

## 2023-03-10 PROCEDURE — 6370000000 HC RX 637 (ALT 250 FOR IP): Performed by: SURGERY

## 2023-03-10 PROCEDURE — 1200000000 HC SEMI PRIVATE

## 2023-03-10 PROCEDURE — 2580000003 HC RX 258: Performed by: SURGERY

## 2023-03-10 PROCEDURE — 6360000002 HC RX W HCPCS: Performed by: SURGERY

## 2023-03-10 PROCEDURE — 6370000000 HC RX 637 (ALT 250 FOR IP): Performed by: INTERNAL MEDICINE

## 2023-03-10 PROCEDURE — 94761 N-INVAS EAR/PLS OXIMETRY MLT: CPT

## 2023-03-10 RX ORDER — DOXYCYCLINE HYCLATE 100 MG
100 TABLET ORAL EVERY 12 HOURS SCHEDULED
Status: DISCONTINUED | OUTPATIENT
Start: 2023-03-10 | End: 2023-03-13 | Stop reason: HOSPADM

## 2023-03-10 RX ADMIN — OXYCODONE AND ACETAMINOPHEN 1 TABLET: 5; 325 TABLET ORAL at 09:40

## 2023-03-10 RX ADMIN — CLONIDINE HYDROCHLORIDE 0.2 MG: 0.2 TABLET ORAL at 21:04

## 2023-03-10 RX ADMIN — OXYCODONE AND ACETAMINOPHEN 1 TABLET: 5; 325 TABLET ORAL at 04:43

## 2023-03-10 RX ADMIN — MORPHINE SULFATE 2 MG: 2 INJECTION, SOLUTION INTRAMUSCULAR; INTRAVENOUS at 18:07

## 2023-03-10 RX ADMIN — METRONIDAZOLE 500 MG: 250 TABLET ORAL at 21:04

## 2023-03-10 RX ADMIN — OXYCODONE AND ACETAMINOPHEN 1 TABLET: 5; 325 TABLET ORAL at 19:50

## 2023-03-10 RX ADMIN — MORPHINE SULFATE 2 MG: 2 INJECTION, SOLUTION INTRAMUSCULAR; INTRAVENOUS at 22:24

## 2023-03-10 RX ADMIN — DOXYCYCLINE HYCLATE 100 MG: 100 TABLET, COATED ORAL at 21:03

## 2023-03-10 RX ADMIN — CEFEPIME 2000 MG: 2 INJECTION, POWDER, FOR SOLUTION INTRAVENOUS at 04:49

## 2023-03-10 RX ADMIN — SODIUM CHLORIDE, PRESERVATIVE FREE 10 ML: 5 INJECTION INTRAVENOUS at 22:24

## 2023-03-10 RX ADMIN — PANTOPRAZOLE SODIUM 40 MG: 40 TABLET, DELAYED RELEASE ORAL at 06:20

## 2023-03-10 RX ADMIN — MORPHINE SULFATE 2 MG: 2 INJECTION, SOLUTION INTRAMUSCULAR; INTRAVENOUS at 08:20

## 2023-03-10 RX ADMIN — MORPHINE SULFATE 2 MG: 2 INJECTION, SOLUTION INTRAMUSCULAR; INTRAVENOUS at 12:48

## 2023-03-10 RX ADMIN — HYDROXYZINE HYDROCHLORIDE 25 MG: 25 TABLET, FILM COATED ORAL at 21:03

## 2023-03-10 RX ADMIN — METRONIDAZOLE 500 MG: 250 TABLET ORAL at 06:20

## 2023-03-10 RX ADMIN — VANCOMYCIN HYDROCHLORIDE 1250 MG: 1.25 INJECTION, POWDER, LYOPHILIZED, FOR SOLUTION INTRAVENOUS at 06:20

## 2023-03-10 RX ADMIN — METRONIDAZOLE 500 MG: 250 TABLET ORAL at 15:14

## 2023-03-10 RX ADMIN — MORPHINE SULFATE 2 MG: 2 INJECTION, SOLUTION INTRAMUSCULAR; INTRAVENOUS at 02:04

## 2023-03-10 RX ADMIN — SODIUM CHLORIDE, PRESERVATIVE FREE 10 ML: 5 INJECTION INTRAVENOUS at 08:21

## 2023-03-10 RX ADMIN — OXYCODONE AND ACETAMINOPHEN 1 TABLET: 5; 325 TABLET ORAL at 15:14

## 2023-03-10 RX ADMIN — SENNOSIDES AND DOCUSATE SODIUM 2 TABLET: 50; 8.6 TABLET ORAL at 08:18

## 2023-03-10 RX ADMIN — POLYETHYLENE GLYCOL (3350) 17 G: 17 POWDER, FOR SOLUTION ORAL at 08:19

## 2023-03-10 RX ADMIN — ENOXAPARIN SODIUM 40 MG: 100 INJECTION SUBCUTANEOUS at 08:21

## 2023-03-10 RX ADMIN — AMLODIPINE BESYLATE 10 MG: 10 TABLET ORAL at 21:04

## 2023-03-10 RX ADMIN — DOXYCYCLINE HYCLATE 100 MG: 100 TABLET, COATED ORAL at 08:18

## 2023-03-10 ASSESSMENT — PAIN SCALES - GENERAL
PAINLEVEL_OUTOF10: 10
PAINLEVEL_OUTOF10: 7
PAINLEVEL_OUTOF10: 7
PAINLEVEL_OUTOF10: 8
PAINLEVEL_OUTOF10: 10
PAINLEVEL_OUTOF10: 8
PAINLEVEL_OUTOF10: 7
PAINLEVEL_OUTOF10: 8
PAINLEVEL_OUTOF10: 6
PAINLEVEL_OUTOF10: 10
PAINLEVEL_OUTOF10: 9
PAINLEVEL_OUTOF10: 6
PAINLEVEL_OUTOF10: 8

## 2023-03-10 ASSESSMENT — PAIN DESCRIPTION - LOCATION
LOCATION: FOOT
LOCATION: LEG;FOOT
LOCATION: FOOT

## 2023-03-10 ASSESSMENT — PAIN DESCRIPTION - ONSET
ONSET: ON-GOING

## 2023-03-10 ASSESSMENT — PAIN DESCRIPTION - ORIENTATION
ORIENTATION: RIGHT

## 2023-03-10 ASSESSMENT — ENCOUNTER SYMPTOMS
BACK PAIN: 0
EYE DISCHARGE: 0
CONSTIPATION: 0
SORE THROAT: 0
SHORTNESS OF BREATH: 1
ABDOMINAL DISTENTION: 0
COUGH: 0
DIARRHEA: 0
WHEEZING: 0

## 2023-03-10 ASSESSMENT — PAIN - FUNCTIONAL ASSESSMENT

## 2023-03-10 ASSESSMENT — PAIN DESCRIPTION - PAIN TYPE
TYPE: SURGICAL PAIN

## 2023-03-10 ASSESSMENT — PAIN DESCRIPTION - FREQUENCY
FREQUENCY: CONTINUOUS

## 2023-03-10 ASSESSMENT — PAIN DESCRIPTION - DESCRIPTORS
DESCRIPTORS: BURNING;SHARP;STABBING
DESCRIPTORS: SHARP;POUNDING;STABBING
DESCRIPTORS: POUNDING;SHARP;STABBING
DESCRIPTORS: CRAMPING;ACHING;BURNING
DESCRIPTORS: SHOOTING;TIGHTNESS
DESCRIPTORS: BURNING;STABBING
DESCRIPTORS: SHOOTING;STABBING;SHARP

## 2023-03-10 ASSESSMENT — PAIN SCALES - WONG BAKER: WONGBAKER_NUMERICALRESPONSE: 4

## 2023-03-10 NOTE — PROGRESS NOTES
V2.0  [facility] Progress Note      Name:  Krishna eDe /Age/Sex: 1990  (35 y.o. female)   MRN & CSN:  [id number] & [id number] Encounter Date/Time: 3/10/2023 10:12 AM EST    Location:  [address] [place] [address]-A PCP: Dasia Hodge, [address] [facility] Day: 4    Assessment and Plan:   Krishna Dee is a 35 y.o. female with pmh of hypertension, asthma  who presents with Cellulitis of right foot      Plan:  Right foot cellulitis and abscess   - failed outpatient bactrim   - afebrile without leukocytosis, HDS  - XR R foot with diffuse soft tissue swelling along the right foot concerning for infection, no underlying osteomyelitis  - CT R foot concerning for possible abscess between first and second digit   - blood cultures drawn in ED,   - CRP elevated but Pro-Trey negative  -S/p I&D of right foot abscess 3/8.  -Cultures growing Staph aureus. Antibiotics changed to p.o. doxycycline per sensitivity  -Continue p.o. metronidazole. Awaiting on final anaerobic cultures. Epigastric abdominal pain  -Resolved    Constipation   - start bowel regimen     Asthma   -No signs or symptoms of acute exacerbation    Hypertension  -Continue home clonidine and amlodipine    Diet ADULT DIET; Regular   DVT Prophylaxis [x] Lovenox, []  Heparin, [] SCDs, [] Ambulation,  [] Eliquis, [] Xarelto  [] Coumadin   Code Status Full Code   Disposition From: Home  Expected Disposition: Home  Estimated Date of Discharge: 2-3 days  Patient requires continued admission due to right foot abscess anaerobic culture pending   Surrogate Decision Maker/ POA      Subjective:     Chief Complaint: Foot Injury (States that she injured her foot Friday. Has had increased swelling and pain moving up her foot and into her ankle. Pt family  believes she may need IV antibiotics)       Seen and evaluated at bedside. Complaining of ongoing pain on right leg. Denies any other complaints. Antibiotics changed to p.o. doxycycline today.   Awaiting final anaerobic culture. Review of Systems:    Review of Systems   Constitutional:  Positive for activity change, appetite change and fatigue. Negative for diaphoresis. HENT:  Negative for congestion and sore throat. Eyes:  Negative for discharge and visual disturbance. Respiratory:  Positive for shortness of breath. Negative for cough and wheezing. Cardiovascular:  Negative for chest pain, palpitations and leg swelling. Gastrointestinal:  Negative for abdominal distention, constipation and diarrhea. Genitourinary:  Negative for difficulty urinating and hematuria. Musculoskeletal:  Positive for arthralgias. Negative for back pain and gait problem. Skin:  Positive for wound. Negative for rash. Neurological:  Negative for dizziness, syncope and speech difficulty. Hematological:  Negative for adenopathy. Psychiatric/Behavioral:  Negative for agitation and confusion. All other systems reviewed and are negative. Objective: Intake/Output Summary (Last 24 hours) at 3/10/2023 1319  Last data filed at 3/10/2023 0827  Gross per 24 hour   Intake 640 ml   Output 400 ml   Net 240 ml          Vitals:   Vitals:    03/10/23 1248   BP:    Pulse:    Resp: 17   Temp:    SpO2:        Physical Exam:   Physical Exam  Vitals and nursing note reviewed. Constitutional:       General: She is not in acute distress. Appearance: She is obese. She is not ill-appearing. HENT:      Head: Normocephalic and atraumatic. Nose: Nose normal.      Mouth/Throat:      Mouth: Mucous membranes are moist.   Eyes:      Extraocular Movements: Extraocular movements intact. Pupils: Pupils are equal, round, and reactive to light. Cardiovascular:      Rate and Rhythm: Normal rate and regular rhythm. Pulses: Normal pulses. Heart sounds: Normal heart sounds. Pulmonary:      Effort: Pulmonary effort is normal.      Breath sounds: Normal breath sounds. Abdominal:      Palpations: Abdomen is soft. Musculoskeletal:         General: Normal range of motion. Cervical back: Normal range of motion. Comments: Right foot abscess post incision and drainage, covered with dressing, C/D/I   Skin:     General: Skin is warm. Capillary Refill: Capillary refill takes less than 2 seconds. Neurological:      General: No focal deficit present. Mental Status: She is alert and oriented to person, place, and time. Psychiatric:         Mood and Affect: Mood normal.         Behavior: Behavior normal.          Medications:   Medications:    doxycycline hyclate  100 mg Oral 2 times per day    metroNIDAZOLE  500 mg Oral 3 times per day    pantoprazole  40 mg Oral QAM AC    polyethylene glycol  17 g Oral Daily    sennosides-docusate sodium  2 tablet Oral Daily    amLODIPine  10 mg Oral Daily    cloNIDine  0.2 mg Oral Daily    VORTIoxetine  10 mg Oral Daily    sodium chloride flush  5-40 mL IntraVENous 2 times per day    enoxaparin  40 mg SubCUTAneous Daily      Infusions:    lactated ringers IV soln 50 mL/hr at 03/09/23 1925    sodium chloride       PRN Meds: droperidol, 0.625 mg, Q10 Min PRN  morphine, 2 mg, Q4H PRN  hydrOXYzine HCl, 25 mg, Daily PRN  sodium chloride flush, 5-40 mL, PRN  sodium chloride, , PRN  ondansetron, 4 mg, Q8H PRN   Or  ondansetron, 4 mg, Q6H PRN  polyethylene glycol, 17 g, Daily PRN  acetaminophen, 650 mg, Q6H PRN   Or  acetaminophen, 650 mg, Q6H PRN  oxyCODONE-acetaminophen, 1 tablet, Q4H PRN  sodium chloride flush, 10 mL, PRN      Labs      No results found for this or any previous visit (from the past 24 hour(s)). Imaging/Diagnostics Last 24 Hours   XR FOOT RIGHT (MIN 3 VIEWS)    Result Date: 3/7/2023  EXAMINATION: THREE XRAY VIEWS OF THE RIGHT FOOT 3/7/2023 10:17 am COMPARISON: 03/04/2023.  HISTORY: ORDERING SYSTEM PROVIDED HISTORY: swelling TECHNOLOGIST PROVIDED HISTORY: Reason for exam:->swelling Reason for Exam: swelling FINDINGS: There is diffuse soft tissue swelling along the right foot which is concerning for underlying infection. There is radiopaque material noted along the 1st toe extending into the 1st and 2nd toe web space. No fracture or osseous destructive lesion. No periosteal reaction. Minimal degenerative changes are noted in the midfoot. 1. Diffuse soft tissue swelling along the right foot, concerning for infection. No underlying fracture or evidence of osteomyelitis. 2. Radiopaque material is noted along the 1st toe extending into the 1st and 2nd web space, of uncertain etiology. CT FOOT RIGHT W CONTRAST    Result Date: 3/7/2023  EXAMINATION: CT OF THE RIGHT FOOT WITH CONTRAST 3/7/2023 1:42 pm TECHNIQUE: CT of the right foot was performed with the administration of intravenous contrast.  Multiplanar reformatted images are provided for review. Automated exposure control, iterative reconstruction, and/or weight based adjustment of the mA/kV was utilized to reduce the radiation dose to as low as reasonably achievable. COMPARISON: Radiographs 03/04/2023 and 03/07/2023. HISTORY ORDERING SYSTEM PROVIDED HISTORY: ro osteomyeltits TECHNOLOGIST PROVIDED HISTORY: Reason for exam:->ro osteomyeltits Decision Support Exception - unselect if not a suspected or confirmed emergency medical condition->Emergency Medical Condition (MA) Reason for Exam: R/O osteomyelitis FINDINGS: Bones: No evidence of acute fracture or dislocation. No aggressive appearing osseous abnormality or periostitis. Soft Tissue: Lobulated fluid collection along the dorsal 1st and 2nd webspace with adjacent hyperdense material, concerning for infection, measuring approximately 2.0 x 0.6 x 2.9 cm. Diffuse soft tissue stranding and skin thickening, nonspecific. No discrete soft tissue gas. No discrete fully retracted tendon tear. Focal soft tissue ulceration along the dorsal margin of the 1st distal phalanx. Joint: No significant degenerative changes. No osseous erosions.      Peripherally enhancing collection between the 1st and 2nd digits is concerning for abscess. Adjacent hyperdense material is nonspecific. Diffuse soft tissue stranding and skin thickening is concerning for cellulitis. No discrete CT evidence of osteomyelitis. Recommend further evaluation with MRI.        Electronically signed by Shelbie Sandifer, MD on 3/10/2023 at 1:19 PM

## 2023-03-10 NOTE — CARE COORDINATION
This RN case manager spoke with patient regarding [facility]. Pt is agreeable to [facility]. She does not have a preference in agencies, she would like referral sent to agency that is in network with her insurance. Referrals sent to [facility].

## 2023-03-11 ENCOUNTER — APPOINTMENT (OUTPATIENT)
Dept: GENERAL RADIOLOGY | Age: 33
DRG: 364 | End: 2023-03-11
Payer: COMMERCIAL

## 2023-03-11 LAB
BACTERIA: NEGATIVE /HPF
BILIRUBIN URINE: NEGATIVE MG/DL
BLOOD, URINE: ABNORMAL
CLARITY: CLEAR
COLOR: YELLOW
GLUCOSE, URINE: NEGATIVE MG/DL
KETONES, URINE: NEGATIVE MG/DL
LEUKOCYTE ESTERASE, URINE: NEGATIVE
NITRITE URINE, QUANTITATIVE: NEGATIVE
PH, URINE: 6.5 (ref 5–8)
PROTEIN UA: NEGATIVE MG/DL
RBC URINE: 21 /HPF (ref 0–6)
SPECIFIC GRAVITY UA: 1.01 (ref 1–1.03)
SQUAMOUS EPITHELIAL: 1 /HPF
TRICHOMONAS: ABNORMAL /HPF
UROBILINOGEN, URINE: 0.2 MG/DL (ref 0.2–1)
WBC UA: <1 /HPF (ref 0–5)

## 2023-03-11 PROCEDURE — 6370000000 HC RX 637 (ALT 250 FOR IP): Performed by: SURGERY

## 2023-03-11 PROCEDURE — 81001 URINALYSIS AUTO W/SCOPE: CPT

## 2023-03-11 PROCEDURE — 94761 N-INVAS EAR/PLS OXIMETRY MLT: CPT

## 2023-03-11 PROCEDURE — 73060 X-RAY EXAM OF HUMERUS: CPT

## 2023-03-11 PROCEDURE — 2580000003 HC RX 258: Performed by: SURGERY

## 2023-03-11 PROCEDURE — 6370000000 HC RX 637 (ALT 250 FOR IP): Performed by: INTERNAL MEDICINE

## 2023-03-11 PROCEDURE — 1200000000 HC SEMI PRIVATE

## 2023-03-11 PROCEDURE — 73030 X-RAY EXAM OF SHOULDER: CPT

## 2023-03-11 PROCEDURE — 6360000002 HC RX W HCPCS: Performed by: SURGERY

## 2023-03-11 RX ADMIN — OXYCODONE AND ACETAMINOPHEN 1 TABLET: 5; 325 TABLET ORAL at 11:10

## 2023-03-11 RX ADMIN — OXYCODONE AND ACETAMINOPHEN 1 TABLET: 5; 325 TABLET ORAL at 18:48

## 2023-03-11 RX ADMIN — OXYCODONE AND ACETAMINOPHEN 1 TABLET: 5; 325 TABLET ORAL at 05:37

## 2023-03-11 RX ADMIN — METRONIDAZOLE 500 MG: 250 TABLET ORAL at 20:57

## 2023-03-11 RX ADMIN — DOXYCYCLINE HYCLATE 100 MG: 100 TABLET, COATED ORAL at 09:47

## 2023-03-11 RX ADMIN — PANTOPRAZOLE SODIUM 40 MG: 40 TABLET, DELAYED RELEASE ORAL at 05:37

## 2023-03-11 RX ADMIN — MORPHINE SULFATE 2 MG: 2 INJECTION, SOLUTION INTRAMUSCULAR; INTRAVENOUS at 14:58

## 2023-03-11 RX ADMIN — METRONIDAZOLE 500 MG: 250 TABLET ORAL at 05:37

## 2023-03-11 RX ADMIN — MORPHINE SULFATE 2 MG: 2 INJECTION, SOLUTION INTRAMUSCULAR; INTRAVENOUS at 09:12

## 2023-03-11 RX ADMIN — HYDROXYZINE HYDROCHLORIDE 25 MG: 25 TABLET, FILM COATED ORAL at 20:57

## 2023-03-11 RX ADMIN — POLYETHYLENE GLYCOL (3350) 17 G: 17 POWDER, FOR SOLUTION ORAL at 09:47

## 2023-03-11 RX ADMIN — CLONIDINE HYDROCHLORIDE 0.2 MG: 0.2 TABLET ORAL at 20:57

## 2023-03-11 RX ADMIN — AMLODIPINE BESYLATE 10 MG: 10 TABLET ORAL at 20:57

## 2023-03-11 RX ADMIN — DOXYCYCLINE HYCLATE 100 MG: 100 TABLET, COATED ORAL at 20:57

## 2023-03-11 RX ADMIN — SODIUM CHLORIDE, POTASSIUM CHLORIDE, SODIUM LACTATE AND CALCIUM CHLORIDE: 600; 310; 30; 20 INJECTION, SOLUTION INTRAVENOUS at 01:07

## 2023-03-11 RX ADMIN — SENNOSIDES AND DOCUSATE SODIUM 2 TABLET: 50; 8.6 TABLET ORAL at 09:46

## 2023-03-11 RX ADMIN — METRONIDAZOLE 500 MG: 250 TABLET ORAL at 14:58

## 2023-03-11 RX ADMIN — OXYCODONE AND ACETAMINOPHEN 1 TABLET: 5; 325 TABLET ORAL at 01:01

## 2023-03-11 ASSESSMENT — PAIN DESCRIPTION - LOCATION
LOCATION: LEG;FOOT
LOCATION: FOOT
LOCATION: FOOT
LOCATION: FOOT;LEG
LOCATION: ABDOMEN
LOCATION: FOOT

## 2023-03-11 ASSESSMENT — ENCOUNTER SYMPTOMS
SORE THROAT: 0
WHEEZING: 0
SHORTNESS OF BREATH: 1
EYE DISCHARGE: 0
ABDOMINAL DISTENTION: 0
DIARRHEA: 0
COUGH: 0
CONSTIPATION: 0
BACK PAIN: 0

## 2023-03-11 ASSESSMENT — PAIN DESCRIPTION - DESCRIPTORS
DESCRIPTORS: SHOOTING;SHARP
DESCRIPTORS: BURNING;STABBING;THROBBING
DESCRIPTORS: SHOOTING;SHARP;BURNING
DESCRIPTORS: CRAMPING;DISCOMFORT
DESCRIPTORS: SHARP;THROBBING;DISCOMFORT
DESCRIPTORS: ACHING;CRAMPING

## 2023-03-11 ASSESSMENT — PAIN DESCRIPTION - ORIENTATION
ORIENTATION: RIGHT

## 2023-03-11 ASSESSMENT — PAIN SCALES - WONG BAKER
WONGBAKER_NUMERICALRESPONSE: 4

## 2023-03-11 ASSESSMENT — PAIN SCALES - GENERAL
PAINLEVEL_OUTOF10: 9
PAINLEVEL_OUTOF10: 6
PAINLEVEL_OUTOF10: 10
PAINLEVEL_OUTOF10: 9
PAINLEVEL_OUTOF10: 7
PAINLEVEL_OUTOF10: 7

## 2023-03-11 ASSESSMENT — PAIN - FUNCTIONAL ASSESSMENT
PAIN_FUNCTIONAL_ASSESSMENT: ACTIVITIES ARE NOT PREVENTED
PAIN_FUNCTIONAL_ASSESSMENT: PREVENTS OR INTERFERES SOME ACTIVE ACTIVITIES AND ADLS

## 2023-03-11 NOTE — PROGRESS NOTES
V2.0  [facility] [place] Hospitalist Progress Note      Name:  Karlene Tejada /Age/Sex: 1990  (35 y.o. female)   MRN & CSN:  [id number] & [id number] Encounter Date/Time: 3/11/2023 10:12 AM EST    Location:  [address] [place] [address]-A PCP: Margo Granados, [address] [facility] Day: 5    Assessment and Plan:   Karlene Tejada is a 35 y.o. female with pmh of hypertension, asthma  who presents with Cellulitis of right foot      Plan:  Right foot cellulitis and abscess   - failed outpatient bactrim   - afebrile without leukocytosis, HDS  - XR R foot with diffuse soft tissue swelling along the right foot concerning for infection, no underlying osteomyelitis  - CT R foot concerning for possible abscess between first and second digit   - blood cultures drawn in ED,   - CRP elevated but Pro-Trey negative  -S/p I&D of right foot abscess 3/8.  -Cultures growing Staph aureus. Antibiotics changed to p.o. doxycycline per sensitivity  -Continue p.o. metronidazole. Awaiting on final anaerobic cultures. -Dressing changed today at bedside     Epigastric abdominal pain  -Resolved    Constipation   - start bowel regimen     Asthma   -No signs or symptoms of acute exacerbation    Hypertension  -Continue home clonidine and amlodipine    Diet ADULT DIET; Regular   DVT Prophylaxis [x] Lovenox, []  Heparin, [] SCDs, [] Ambulation,  [] Eliquis, [] Xarelto  [] Coumadin   Code Status Full Code   Disposition From: Home  Expected Disposition: Home  Estimated Date of Discharge: 2-3 days  Patient requires continued admission due to right foot abscess anaerobic culture pending   Surrogate Decision Maker/ POA      Subjective:     Chief Complaint: Foot Injury (States that she injured her foot Friday. Has had increased swelling and pain moving up her foot and into her ankle. Pt family Dr believes she may need IV antibiotics)       Seen and evaluated at bedside. Complaining of ongoing pain on right leg. Denies any other complaints.   Waiting on final anaerobic cultures     Review of Systems:    Review of Systems   Constitutional:  Positive for activity change, appetite change and fatigue. Negative for diaphoresis. HENT:  Negative for congestion and sore throat. Eyes:  Negative for discharge and visual disturbance. Respiratory:  Positive for shortness of breath. Negative for cough and wheezing. Cardiovascular:  Negative for chest pain, palpitations and leg swelling. Gastrointestinal:  Negative for abdominal distention, constipation and diarrhea. Genitourinary:  Negative for difficulty urinating and hematuria. Musculoskeletal:  Positive for arthralgias. Negative for back pain and gait problem. Skin:  Positive for wound. Negative for rash. Neurological:  Negative for dizziness, syncope and speech difficulty. Hematological:  Negative for adenopathy. Psychiatric/Behavioral:  Negative for agitation and confusion. All other systems reviewed and are negative. Objective: Intake/Output Summary (Last 24 hours) at 3/11/2023 1442  Last data filed at 3/11/2023 0537  Gross per 24 hour   Intake 598 ml   Output 550 ml   Net 48 ml          Vitals:   Vitals:    03/11/23 0930   BP: 123/80   Pulse: 85   Resp: 17   Temp: 98.4 °F (36.9 °C)   SpO2:        Physical Exam:   Physical Exam  Vitals and nursing note reviewed. Constitutional:       General: She is not in acute distress. Appearance: She is obese. She is not ill-appearing. HENT:      Head: Normocephalic and atraumatic. Nose: Nose normal.      Mouth/Throat:      Mouth: Mucous membranes are moist.   Eyes:      Extraocular Movements: Extraocular movements intact. Pupils: Pupils are equal, round, and reactive to light. Cardiovascular:      Rate and Rhythm: Normal rate and regular rhythm. Pulses: Normal pulses. Heart sounds: Normal heart sounds. Pulmonary:      Effort: Pulmonary effort is normal.      Breath sounds: Normal breath sounds.    Abdominal:      Palpations: Abdomen is soft. Musculoskeletal:         General: Normal range of motion. Cervical back: Normal range of motion. Comments: Right foot abscess post incision and drainage, covered with dressing, C/D/I   Skin:     General: Skin is warm. Capillary Refill: Capillary refill takes less than 2 seconds. Neurological:      General: No focal deficit present. Mental Status: She is alert and oriented to person, place, and time. Psychiatric:         Mood and Affect: Mood normal.         Behavior: Behavior normal.          Medications:   Medications:    doxycycline hyclate  100 mg Oral 2 times per day    metroNIDAZOLE  500 mg Oral 3 times per day    pantoprazole  40 mg Oral QAM AC    polyethylene glycol  17 g Oral Daily    sennosides-docusate sodium  2 tablet Oral Daily    amLODIPine  10 mg Oral Daily    cloNIDine  0.2 mg Oral Daily    VORTIoxetine  10 mg Oral Daily    sodium chloride flush  5-40 mL IntraVENous 2 times per day    enoxaparin  40 mg SubCUTAneous Daily      Infusions:    lactated ringers IV soln 50 mL/hr at 03/11/23 0107    sodium chloride       PRN Meds: droperidol, 0.625 mg, Q10 Min PRN  morphine, 2 mg, Q4H PRN  hydrOXYzine HCl, 25 mg, Daily PRN  sodium chloride flush, 5-40 mL, PRN  sodium chloride, , PRN  ondansetron, 4 mg, Q8H PRN   Or  ondansetron, 4 mg, Q6H PRN  polyethylene glycol, 17 g, Daily PRN  acetaminophen, 650 mg, Q6H PRN   Or  acetaminophen, 650 mg, Q6H PRN  oxyCODONE-acetaminophen, 1 tablet, Q4H PRN  sodium chloride flush, 10 mL, PRN      Labs      No results found for this or any previous visit (from the past 24 hour(s)). Imaging/Diagnostics Last 24 Hours   XR FOOT RIGHT (MIN 3 VIEWS)    Result Date: 3/7/2023  EXAMINATION: THREE XRAY VIEWS OF THE RIGHT FOOT 3/7/2023 10:17 am COMPARISON: 03/04/2023.  HISTORY: ORDERING SYSTEM PROVIDED HISTORY: swelling TECHNOLOGIST PROVIDED HISTORY: Reason for exam:->swelling Reason for Exam: swelling FINDINGS: There is diffuse soft tissue swelling along the right foot which is concerning for underlying infection. There is radiopaque material noted along the 1st toe extending into the 1st and 2nd toe web space. No fracture or osseous destructive lesion. No periosteal reaction. Minimal degenerative changes are noted in the midfoot. 1. Diffuse soft tissue swelling along the right foot, concerning for infection. No underlying fracture or evidence of osteomyelitis. 2. Radiopaque material is noted along the 1st toe extending into the 1st and 2nd web space, of uncertain etiology. CT FOOT RIGHT W CONTRAST    Result Date: 3/7/2023  EXAMINATION: CT OF THE RIGHT FOOT WITH CONTRAST 3/7/2023 1:42 pm TECHNIQUE: CT of the right foot was performed with the administration of intravenous contrast.  Multiplanar reformatted images are provided for review. Automated exposure control, iterative reconstruction, and/or weight based adjustment of the mA/kV was utilized to reduce the radiation dose to as low as reasonably achievable. COMPARISON: Radiographs 03/04/2023 and 03/07/2023. HISTORY ORDERING SYSTEM PROVIDED HISTORY: ro osteomyeltits TECHNOLOGIST PROVIDED HISTORY: Reason for exam:->ro osteomyeltits Decision Support Exception - unselect if not a suspected or confirmed emergency medical condition->Emergency Medical Condition (MA) Reason for Exam: R/O osteomyelitis FINDINGS: Bones: No evidence of acute fracture or dislocation. No aggressive appearing osseous abnormality or periostitis. Soft Tissue: Lobulated fluid collection along the dorsal 1st and 2nd webspace with adjacent hyperdense material, concerning for infection, measuring approximately 2.0 x 0.6 x 2.9 cm. Diffuse soft tissue stranding and skin thickening, nonspecific. No discrete soft tissue gas. No discrete fully retracted tendon tear. Focal soft tissue ulceration along the dorsal margin of the 1st distal phalanx. Joint: No significant degenerative changes. No osseous erosions.      Peripherally enhancing collection between the 1st and 2nd digits is concerning for abscess. Adjacent hyperdense material is nonspecific. Diffuse soft tissue stranding and skin thickening is concerning for cellulitis. No discrete CT evidence of osteomyelitis. Recommend further evaluation with MRI.        Electronically signed by Juvencio Byrd MD on 3/11/2023 at 2:42 PM

## 2023-03-11 NOTE — DISCHARGE INSTRUCTIONS
Change dressing daily. Remove packing and replace with gauze or short length of iodoform ribbon. Wrap foot with Kerlix.

## 2023-03-11 NOTE — PROGRESS NOTES
GENERAL SURGERY PROGRESS NOTE    Sj Aiken is a 35 y.o. female status post I&D of right foot abscess. Subjective: To be discharged today. Objective:    Vitals: VITALS:  /60   Pulse 79   Temp 98.5 °F (36.9 °C) (Oral)   Resp 18   Ht 5' 5\" (1.651 m)   Wt 215 lb (97.5 kg)   LMP 02/15/2023 (Exact Date)   SpO2 98%   BMI 35.78 kg/m²     I/O: 03/10 0701 - 03/11 0700  In: 598 [P.O.:598]  Out: 950 [Urine:950]    Labs/Imaging Results:   Lab Results   Component Value Date/Time     03/09/2023 10:40 AM    K 4.4 03/09/2023 10:40 AM     03/09/2023 10:40 AM    CO2 25 03/09/2023 10:40 AM    BUN 10 03/09/2023 10:40 AM    CREATININE 0.6 03/09/2023 10:40 AM    GLUCOSE 103 03/09/2023 10:40 AM    CALCIUM 9.2 03/09/2023 10:40 AM      Lab Results   Component Value Date    WBC 11.1 (H) 03/09/2023    HGB 13.4 03/09/2023    HCT 40.7 03/09/2023    MCV 90.4 03/09/2023     03/09/2023          IV Fluids:   lactated ringers IV soln Last Rate: 50 mL/hr at 03/11/23 0107    sodium chloride    Scheduled Meds:   doxycycline hyclate, 100 mg, Oral, 2 times per day    metroNIDAZOLE, 500 mg, Oral, 3 times per day    pantoprazole, 40 mg, Oral, QAM AC    polyethylene glycol, 17 g, Oral, Daily    sennosides-docusate sodium, 2 tablet, Oral, Daily    amLODIPine, 10 mg, Oral, Daily    cloNIDine, 0.2 mg, Oral, Daily    VORTIoxetine, 10 mg, Oral, Daily    sodium chloride flush, 5-40 mL, IntraVENous, 2 times per day    enoxaparin, 40 mg, SubCUTAneous, Daily    Physical Exam:  General: A&O x 3, no distress. HEENT: Anicteric sclerae, MMM. Extremities: No edema bilat LE. Abdomen: Soft, nondistended, nontender   Right foot with improved erythema/edema. Wound with clean wound base      Assessment and Plan:     Patient Active Problem List:     Cellulitis of right foot     Abscess of right foot      Principal Problem:    Cellulitis of right foot  Plan:    Active Problems:    Abscess of right foot  Plan:      Postop day 2 right foot abscess I&D  Okay for discharge from surgery standpoint. Patient instructed on dressing changes. Dressing to be changed by nursing prior to discharge. Discussed with nursing. Needs offloading boot    Patient requesting wheelchair etc. for home-discussed with nursing and  working on this. Patient to follow-up in wound care clinic after discharge.        Janae Gomez, DO

## 2023-03-12 ENCOUNTER — APPOINTMENT (OUTPATIENT)
Dept: CT IMAGING | Age: 33
DRG: 364 | End: 2023-03-12
Payer: COMMERCIAL

## 2023-03-12 LAB
CULTURE: NORMAL
Lab: NORMAL
SPECIMEN: NORMAL

## 2023-03-12 PROCEDURE — 6360000002 HC RX W HCPCS: Performed by: SURGERY

## 2023-03-12 PROCEDURE — 2580000003 HC RX 258: Performed by: SURGERY

## 2023-03-12 PROCEDURE — 94761 N-INVAS EAR/PLS OXIMETRY MLT: CPT

## 2023-03-12 PROCEDURE — 6370000000 HC RX 637 (ALT 250 FOR IP): Performed by: SURGERY

## 2023-03-12 PROCEDURE — 6370000000 HC RX 637 (ALT 250 FOR IP): Performed by: INTERNAL MEDICINE

## 2023-03-12 PROCEDURE — 1200000000 HC SEMI PRIVATE

## 2023-03-12 PROCEDURE — 70450 CT HEAD/BRAIN W/O DYE: CPT

## 2023-03-12 RX ADMIN — AMLODIPINE BESYLATE 10 MG: 10 TABLET ORAL at 23:00

## 2023-03-12 RX ADMIN — DOXYCYCLINE HYCLATE 100 MG: 100 TABLET, COATED ORAL at 09:49

## 2023-03-12 RX ADMIN — OXYCODONE AND ACETAMINOPHEN 1 TABLET: 5; 325 TABLET ORAL at 19:04

## 2023-03-12 RX ADMIN — CLONIDINE HYDROCHLORIDE 0.2 MG: 0.2 TABLET ORAL at 23:00

## 2023-03-12 RX ADMIN — SODIUM CHLORIDE, PRESERVATIVE FREE 10 ML: 5 INJECTION INTRAVENOUS at 07:07

## 2023-03-12 RX ADMIN — MORPHINE SULFATE 2 MG: 2 INJECTION, SOLUTION INTRAMUSCULAR; INTRAVENOUS at 07:05

## 2023-03-12 RX ADMIN — ENOXAPARIN SODIUM 40 MG: 100 INJECTION SUBCUTANEOUS at 09:49

## 2023-03-12 RX ADMIN — SODIUM CHLORIDE, POTASSIUM CHLORIDE, SODIUM LACTATE AND CALCIUM CHLORIDE: 600; 310; 30; 20 INJECTION, SOLUTION INTRAVENOUS at 01:10

## 2023-03-12 RX ADMIN — HYDROXYZINE HYDROCHLORIDE 25 MG: 25 TABLET, FILM COATED ORAL at 23:00

## 2023-03-12 RX ADMIN — DOXYCYCLINE HYCLATE 100 MG: 100 TABLET, COATED ORAL at 23:00

## 2023-03-12 RX ADMIN — POLYETHYLENE GLYCOL (3350) 17 G: 17 POWDER, FOR SOLUTION ORAL at 09:49

## 2023-03-12 RX ADMIN — METRONIDAZOLE 500 MG: 250 TABLET ORAL at 14:24

## 2023-03-12 RX ADMIN — OXYCODONE AND ACETAMINOPHEN 1 TABLET: 5; 325 TABLET ORAL at 23:01

## 2023-03-12 RX ADMIN — OXYCODONE AND ACETAMINOPHEN 1 TABLET: 5; 325 TABLET ORAL at 09:49

## 2023-03-12 RX ADMIN — OXYCODONE AND ACETAMINOPHEN 1 TABLET: 5; 325 TABLET ORAL at 00:51

## 2023-03-12 RX ADMIN — PANTOPRAZOLE SODIUM 40 MG: 40 TABLET, DELAYED RELEASE ORAL at 05:14

## 2023-03-12 RX ADMIN — SENNOSIDES AND DOCUSATE SODIUM 2 TABLET: 50; 8.6 TABLET ORAL at 09:49

## 2023-03-12 RX ADMIN — OXYCODONE AND ACETAMINOPHEN 1 TABLET: 5; 325 TABLET ORAL at 05:14

## 2023-03-12 RX ADMIN — OXYCODONE AND ACETAMINOPHEN 1 TABLET: 5; 325 TABLET ORAL at 14:24

## 2023-03-12 RX ADMIN — METRONIDAZOLE 500 MG: 250 TABLET ORAL at 05:14

## 2023-03-12 RX ADMIN — ONDANSETRON 4 MG: 4 TABLET, ORALLY DISINTEGRATING ORAL at 19:04

## 2023-03-12 RX ADMIN — METRONIDAZOLE 500 MG: 250 TABLET ORAL at 23:00

## 2023-03-12 ASSESSMENT — PAIN SCALES - GENERAL
PAINLEVEL_OUTOF10: 8
PAINLEVEL_OUTOF10: 5
PAINLEVEL_OUTOF10: 8
PAINLEVEL_OUTOF10: 7
PAINLEVEL_OUTOF10: 6
PAINLEVEL_OUTOF10: 7
PAINLEVEL_OUTOF10: 7
PAINLEVEL_OUTOF10: 6
PAINLEVEL_OUTOF10: 6
PAINLEVEL_OUTOF10: 7

## 2023-03-12 ASSESSMENT — PAIN DESCRIPTION - ORIENTATION
ORIENTATION: RIGHT

## 2023-03-12 ASSESSMENT — PAIN DESCRIPTION - LOCATION
LOCATION: LEG;FOOT
LOCATION: FOOT

## 2023-03-12 ASSESSMENT — PAIN - FUNCTIONAL ASSESSMENT
PAIN_FUNCTIONAL_ASSESSMENT: PREVENTS OR INTERFERES WITH MANY ACTIVE NOT PASSIVE ACTIVITIES

## 2023-03-12 ASSESSMENT — ENCOUNTER SYMPTOMS
COUGH: 0
ABDOMINAL DISTENTION: 0
BACK PAIN: 0
DIARRHEA: 0
SHORTNESS OF BREATH: 1
WHEEZING: 0
CONSTIPATION: 0
EYE DISCHARGE: 0
SORE THROAT: 0

## 2023-03-12 ASSESSMENT — PAIN DESCRIPTION - DESCRIPTORS
DESCRIPTORS: ACHING;BURNING;STABBING
DESCRIPTORS: SHARP;SORE;SHOOTING
DESCRIPTORS: BURNING;SHARP;STABBING
DESCRIPTORS: SHARP;SHOOTING;SORE
DESCRIPTORS: ACHING;BURNING;STABBING
DESCRIPTORS: BURNING;SHARP;STABBING
DESCRIPTORS: BURNING;THROBBING

## 2023-03-12 ASSESSMENT — PAIN DESCRIPTION - FREQUENCY
FREQUENCY: INTERMITTENT
FREQUENCY: INTERMITTENT

## 2023-03-12 ASSESSMENT — PAIN SCALES - WONG BAKER: WONGBAKER_NUMERICALRESPONSE: 4

## 2023-03-12 ASSESSMENT — PAIN DESCRIPTION - PAIN TYPE
TYPE: SURGICAL PAIN
TYPE: SURGICAL PAIN

## 2023-03-12 ASSESSMENT — PAIN DESCRIPTION - ONSET
ONSET: ON-GOING
ONSET: ON-GOING

## 2023-03-12 NOTE — PROGRESS NOTES
Notified by [facility] ([facility]) that pt. In RM:  1116 stated she fell attempting to use the bathroom without notifying staff. The fall was unwitnessed and pt. Was in bed and got there on her own when [facility] was notified. [facility] immediately notified this Nurse and it was addressed with pt. Immediately. Pt. Was in bed and tearful upon arrival to bedside. Pt. Had been previously educated to use call light when needing any assistance. Pt. Refused. Pt. Had been educated to use [facility] to the immediate right side of bed with assistance. Pt. Educated to use boot when getting up and crutches/assistive device. Pt. Refused. Pt. Stated she wanted to go to the actual bathroom on her own so she could leave soon as well as had to have a BM and did not want it sitting in the [facility]. Pt. Stated she did not use boot, she did not call staff for assistance and she attempted to use only one crutch because she attempted to use the IV pole since continuous fluids were running. Pt. Stated to this nurse she fell and hit her right side on the floor however, got herself up and back into bed before calling for assistance. Pt. had refused bed alarm and requested to be independent. Hospitalist notified. Charge Nurse notified. Vitals taken and pt. Assessed. Stayed with pt. For continued assessment of safety and care. Removed IV line that was pulled and caused loss of IV access. Educated at this point, we needed to re-educate on the use of boot and any other assistive decvices and bed alarm MUST be on. Pt. Verbalized understanding and voiced she would use her call light for any assistance going forward. Will continue to monitor.

## 2023-03-12 NOTE — CARE COORDINATION
Rosy Abebe requires a bedside commode due to being confined to one level of the home, and is physically incapable of utilizing regular toilet facilities. Current body weight is Weight: 215 lb (97.5 kg). Rosy Abebe requires the use of a tub transfer bench to successfully and safely perform bathing and personal care. Patient is unable to transfer in and out of tub/shower combo safely without the use of this assistive device due to the diagnosis of cellulitis of the right foot. Patient has been instructed on the proper use of this equipment and in agreement to utilize. Rosy Abebe requires the assistance of a lightweight wheelchair to successfully complete daily living tasks such as: toileting, bathing, dressing, and grooming; or any other daily living task in the home. A lightweight wheelchair is necessary due to the patient's impaired ambulation and mobility restrictions. The patient would not be able to resolve these daily living tasks using a cane or walker. The patient can self-propel a lightweight wheelchair safely in their home and can maneuver within their home with adequate access. The patient cannot self-propel in a standard wheelchair. The patient has not expressed an unwillingness to use the wheelchair. LSW informed that the patient is wanting a BSC, tub transfer bench and a wheel chair. LSW may need recommendations from therapy for the wheelchair. It should be noted that all the DME companies are closed today. However the DME company can reach out the patient after discharge and coordinate delivery of items.  LSW sent message to the

## 2023-03-12 NOTE — PROGRESS NOTES
POST FALL MANAGEMENT    Светлана Tinsley  MEDICAL RECORD NUMBER:  [id number]  AGE: 33 y.o.   GENDER: female  : 1990  TODAYS DATE:  3/12/2023    Details     Fall Occurred: Yes    Was the Fall Witnessed:  No      Brief Review of Event:Patient used her call light button told the staff her IV was leaking and fell few minutes ago while she was trying to go to the restroom and fell. Patient stated she landed on right shoulder and  hit her head on the floor. She admitted of refusing her bed alarm on and did not call for help prior to ambulation. Patient was educated on fall risk safety precautions. Verbalized understanding. Bed alarm is activated. Fall standard safety precautions implemented.        Who found the patient: Nobody. Patient verbalized to staff she fell when ambulating to the restroom.      Where was the patient at the time of the fall: Unwitnessed fall. Patient was sitting in the side of the bed and stated that she fell on her way to the restroom.      Patient Comments: Patient state, \" I was trying to go the bathroom on my own and I fell. My IV may have came out.\"       Date Fall Occurred:  2023 .       Time Fall Occurred: 8:40p.m.     Assessment     Post Fall Head to Toe Assessment Completed: Yes    Post Fall Predictive Analytic Score Reviewed: Yes     Post Fall Vitals Completed: Yes    Post Fall Neuro Checks Completed: Yes    Injury Occurred(if yes, describe injury):  no           Did the Patient Experience:(Check Enrike all that apply)    [x] Patient hit head  [] Loss of consciousness  [] Change in mental status following the fall  [x] Patient is on an anticoagulant medication      CT Performed:  yes    Follow-up     Persons Notified of Fall:  (Provide names of persons notified)   [] Physician:   [x] TREASURE: Gisele Marr  [x] Nursing Supervisior: Thanh Nieves  [x] Manager: Rosalie Lind  [] Pharmacist:  [] Family:  []  Other:      Electronically signed by Alex Santos RN 3/12/2023 at 12:50 AM

## 2023-03-12 NOTE — PROGRESS NOTES
Patient had call light on, I entered the room and the patient was in bed and stated that her IV was leaking. Patient admitted to having a fall a few minuets prior trying to go to the restroom. I asked where she fell and she stated she fell right in front of the bed and when she fell her IV may have came out a little causing the leaking. Patient stated she typically uses the bed side commode independently, but has had foul smelling BM and wanted to try to go to the restroom. Patient was educated to call nurse or tech if she had to go to the restroom and she did not call and went on her own. Patient is alert and oriented x4. Patient admitted to not wearing her boot when she was educated to do so when getting up. Patient was educated on the bed alarm and standard safety precautions but refused. Nurse Manrique notified immediately.

## 2023-03-12 NOTE — PROGRESS NOTES
Name:  Sol Wang /Age/Sex: 1990  (35 y.o. female)   MRN & CSN:  [id number] & [id number] Encounter Date/Time: 3/11/2023 9:10 PM EST    Location:  [facility]16-A PCP: JOI Westfall - CNP       Call addressed around 3/11/2023 9:10 PM EST     Vitals:   Vitals:    23   BP: (!) 157/92   Pulse: 77   Resp: 18   Temp: 98.4 °F (36.9 °C)   SpO2: 100%         APRN was called for unwitnessed fall; pt back in bed upon arrival to room. Patient seen and examined in        OBJECTIVE: Pt told technician that she fell while getting up to use the bathroom w/o notifying staff. BSC noted at bedside - on RIGHT side. She admits that she is not used to using crutches and fell in front of the bed hitting her RIGHT shoulder/arm. She admits that she hit the RIGHT side of her head. She denies LOC. She admits slight HA, but denies vision changes, or dizziness. She admits her RIGHT bicep is sore but denies decreased ROM. She admits that she was able to get back into bed unassisted. General: AxOx3  HEENT: PERRLA. Vision grossly intact. Hearing grossly intact. Oropharynx clear. Neck: Supple. No JVD. CV: RRR. NL S1/S2. Pulm: CTA, no audible wheezing, stridor  GI: NEG N/V, No ABD tenderness/distention  : No CVA or suprapubic tenderness. Barahona catheter not present. Skin: Warm to touch distally. MSK: No gross joint deformities. RIGHT foot drsg intact. RUE full ROM, tenderness to RIGHT bicep, no edema, no hematoma. Neuro: CNs grossly intact. Normal speech. No focal deficit. No hematoma, no gross deformity noted. No tenderness noted with palpitation. Psych: Good judgement and reason. ASSESSMENT/PLAN:  Will order CT head, XR RIGHT arm. Advised to call before getting up out of bed. BSC at bedside. PT/OT evaluation ordered as pt states she is concerned for going home/being safe and lives in a two story home. Pain medication as ordered; ice PRN to RUE as tolerated.   Will monitor closely.      Electronically signed by JOI Bridges CNP on 3/11/2023 at 9:10 PM

## 2023-03-12 NOTE — PROGRESS NOTES
V2.0  [facility] Hospitalist Progress Note      Name:  Mary Ellen Suarez /Age/Sex: 1990  (35 y.o. female)   MRN & CSN:  [id number] & [id number] Encounter Date/Time: 3/12/2023 10:12 AM EST    Location:  [facility] PCP: Esteban Akhtar50 LANCE Palma Day: 6    Assessment and Plan:   Mary Ellen Suarez is a 35 y.o. female with pmh of hypertension, asthma  who presents with Cellulitis of right foot      Plan:  Right foot cellulitis and abscess   - failed outpatient bactrim   - afebrile without leukocytosis, HDS  - XR R foot with diffuse soft tissue swelling along the right foot concerning for infection, no underlying osteomyelitis  - CT R foot concerning for possible abscess between first and second digit   - blood cultures drawn in ED,   - CRP elevated but Pro-Trey negative  -S/p I&D of right foot abscess 3/8.  -Cultures growing Staph aureus. Antibiotics changed to p.o. doxycycline per sensitivity  -Continue p.o. metronidazole. Awaiting on final anaerobic cultures.  -Discussed with [place] lab. Final cultures likely to be in by 3/13/2023. Epigastric abdominal pain  -Resolved    Constipation   - start bowel regimen     Asthma   -No signs or symptoms of acute exacerbation    Hypertension  -Continue home clonidine and amlodipine    Mechanical fall  -3/12/2023 overnight  -Patient not completely bearing weight on the right lower extremity. Got unsteady and had a mechanical fall. CT head without contrast negative for any intracranial bleeding.  -Educated patient regarding weightbearing as much as possible on the right lower extremity. Patient agreeable.  -Also requesting for wheelchair, bench and bedside commode    Mary Ellen Suarez requires a bedside commode due to being confined to one level of the home, and is physically incapable of utilizing regular toilet facilities. Current body weight is Weight: 215 lb (97.5 kg).          Mary Ellen Suarez requires the use of a tub transfer bench to successfully and safely perform bathing and personal care. Patient is unable to transfer in and out of tub/shower combo safely without the use of this assistive device due to the diagnosis of cellulitis of the right foot. Patient has been instructed on the proper use of this equipment and in agreement to utilize. Baljit Abrams requires the assistance of a lightweight wheelchair to successfully complete daily living tasks such as: toileting, bathing, dressing, and grooming; or any other daily living task in the home. A lightweight wheelchair is necessary due to the patient's impaired ambulation and mobility restrictions. The patient would not be able to resolve these daily living tasks using a cane or walker. The patient can self-propel a lightweight wheelchair safely in their home and can maneuver within their home with adequate access. The patient cannot self-propel in a standard wheelchair. The patient has not expressed an unwillingness to use the wheelchair. Diet ADULT DIET; Regular   DVT Prophylaxis [x] Lovenox, []  Heparin, [] SCDs, [] Ambulation,  [] Eliquis, [] Xarelto  [] Coumadin   Code Status Full Code   Disposition From: Home  Expected Disposition: Home  Estimated Date of Discharge: 2-3 days  Patient requires continued admission due to right foot abscess anaerobic culture pending   Surrogate Decision Maker/ POA      Subjective:     Chief Complaint: Foot Injury (States that she injured her foot Friday. Has had increased swelling and pain moving up her foot and into her ankle. Pt family Dr believes she may need IV antibiotics)       Seen and evaluated at bedside. Discussed anaerobic culture has not been back yet. She was initially upset, but however agreeable to stay today as final cultures are expected to come back on 3/13/2023. We will also have PT OT see her. Educated her regarding weightbearing on right lower extremity with her bedside nurse .  She is under the impression she cannot bear weight on the right lower extremity and trying to balance that she becomes unsteady. Bedside commode, tub transfer and wheelchair ordered per patient's request.  Discussed with  as well. Review of Systems:    Review of Systems   Constitutional:  Positive for activity change, appetite change and fatigue. Negative for diaphoresis. HENT:  Negative for congestion and sore throat. Eyes:  Negative for discharge and visual disturbance. Respiratory:  Positive for shortness of breath. Negative for cough and wheezing. Cardiovascular:  Negative for chest pain, palpitations and leg swelling. Gastrointestinal:  Negative for abdominal distention, constipation and diarrhea. Genitourinary:  Negative for difficulty urinating and hematuria. Musculoskeletal:  Positive for arthralgias. Negative for back pain and gait problem. Skin:  Positive for wound. Negative for rash. Neurological:  Negative for dizziness, syncope and speech difficulty. Hematological:  Negative for adenopathy. Psychiatric/Behavioral:  Negative for agitation and confusion. All other systems reviewed and are negative. Objective: Intake/Output Summary (Last 24 hours) at 3/12/2023 1324  Last data filed at 3/12/2023 0509  Gross per 24 hour   Intake 360 ml   Output 700 ml   Net -340 ml          Vitals:   Vitals:    03/12/23 1019   BP:    Pulse:    Resp: 17   Temp:    SpO2:        Physical Exam:   Physical Exam  Vitals and nursing note reviewed. Constitutional:       General: She is not in acute distress. Appearance: She is obese. She is not ill-appearing. HENT:      Head: Normocephalic and atraumatic. Nose: Nose normal.      Mouth/Throat:      Mouth: Mucous membranes are moist.   Eyes:      Extraocular Movements: Extraocular movements intact. Pupils: Pupils are equal, round, and reactive to light. Cardiovascular:      Rate and Rhythm: Normal rate and regular rhythm. Pulses: Normal pulses.       Heart sounds: Normal heart sounds. Pulmonary:      Effort: Pulmonary effort is normal.      Breath sounds: Normal breath sounds. Abdominal:      Palpations: Abdomen is soft. Musculoskeletal:         General: Normal range of motion. Cervical back: Normal range of motion. Comments: Right foot abscess post incision and drainage, covered with dressing, C/D/I   Skin:     General: Skin is warm. Capillary Refill: Capillary refill takes less than 2 seconds. Neurological:      General: No focal deficit present. Mental Status: She is alert and oriented to person, place, and time.    Psychiatric:         Mood and Affect: Mood normal.         Behavior: Behavior normal.          Medications:   Medications:    doxycycline hyclate  100 mg Oral 2 times per day    metroNIDAZOLE  500 mg Oral 3 times per day    pantoprazole  40 mg Oral QAM AC    polyethylene glycol  17 g Oral Daily    sennosides-docusate sodium  2 tablet Oral Daily    amLODIPine  10 mg Oral Daily    cloNIDine  0.2 mg Oral Daily    VORTIoxetine  10 mg Oral Daily    sodium chloride flush  5-40 mL IntraVENous 2 times per day    enoxaparin  40 mg SubCUTAneous Daily      Infusions:    lactated ringers IV soln 50 mL/hr at 03/12/23 0110    sodium chloride       PRN Meds: droperidol, 0.625 mg, Q10 Min PRN  morphine, 2 mg, Q4H PRN  hydrOXYzine HCl, 25 mg, Daily PRN  sodium chloride flush, 5-40 mL, PRN  sodium chloride, , PRN  ondansetron, 4 mg, Q8H PRN   Or  ondansetron, 4 mg, Q6H PRN  polyethylene glycol, 17 g, Daily PRN  acetaminophen, 650 mg, Q6H PRN   Or  acetaminophen, 650 mg, Q6H PRN  oxyCODONE-acetaminophen, 1 tablet, Q4H PRN  sodium chloride flush, 10 mL, PRN      Labs      Recent Results (from the past 24 hour(s))   Urinalysis    Collection Time: 03/11/23  4:30 PM   Result Value Ref Range    Color, UA YELLOW YELLOW    Clarity, UA CLEAR CLEAR    Glucose, Urine NEGATIVE NEGATIVE MG/DL    Bilirubin Urine NEGATIVE NEGATIVE MG/DL    Ketones, Urine NEGATIVE NEGATIVE MG/DL    Specific Gravity, UA 1.015 1.001 - 1.035    Blood, Urine MODERATE NUMBER OR AMOUNT OBSERVED (A) NEGATIVE    pH, Urine 6.5 5.0 - 8.0    Protein, UA NEGATIVE NEGATIVE MG/DL    Urobilinogen, Urine 0.2 0.2 - 1.0 MG/DL    Nitrite Urine, Quantitative NEGATIVE NEGATIVE    Leukocyte Esterase, Urine NEGATIVE NEGATIVE   Microscopic Urinalysis    Collection Time: 03/11/23  4:30 PM   Result Value Ref Range    RBC, UA 21 (H) 0 - 6 /HPF    WBC, UA <1 0 - 5 /HPF    Bacteria, UA NEGATIVE NEGATIVE /HPF    Squam Epithel, UA 1 /HPF    Trichomonas, UA NONE SEEN NONE SEEN /HPF          Imaging/Diagnostics Last 24 Hours   XR FOOT RIGHT (MIN 3 VIEWS)    Result Date: 3/7/2023  EXAMINATION: THREE XRAY VIEWS OF THE RIGHT FOOT 3/7/2023 10:17 am COMPARISON: 03/04/2023. HISTORY: ORDERING SYSTEM PROVIDED HISTORY: swelling TECHNOLOGIST PROVIDED HISTORY: Reason for exam:->swelling Reason for Exam: swelling FINDINGS: There is diffuse soft tissue swelling along the right foot which is concerning for underlying infection. There is radiopaque material noted along the 1st toe extending into the 1st and 2nd toe web space. No fracture or osseous destructive lesion. No periosteal reaction. Minimal degenerative changes are noted in the midfoot. 1. Diffuse soft tissue swelling along the right foot, concerning for infection. No underlying fracture or evidence of osteomyelitis. 2. Radiopaque material is noted along the 1st toe extending into the 1st and 2nd web space, of uncertain etiology. CT FOOT RIGHT W CONTRAST    Result Date: 3/7/2023  EXAMINATION: CT OF THE RIGHT FOOT WITH CONTRAST 3/7/2023 1:42 pm TECHNIQUE: CT of the right foot was performed with the administration of intravenous contrast.  Multiplanar reformatted images are provided for review.  Automated exposure control, iterative reconstruction, and/or weight based adjustment of the mA/kV was utilized to reduce the radiation dose to as low as reasonably achievable. COMPARISON: Radiographs 03/04/2023 and 03/07/2023. HISTORY ORDERING SYSTEM PROVIDED HISTORY: ro osteomyeltits TECHNOLOGIST PROVIDED HISTORY: Reason for exam:->ro osteomyeltits Decision Support Exception - unselect if not a suspected or confirmed emergency medical condition->Emergency Medical Condition (MA) Reason for Exam: R/O osteomyelitis FINDINGS: Bones: No evidence of acute fracture or dislocation. No aggressive appearing osseous abnormality or periostitis. Soft Tissue: Lobulated fluid collection along the dorsal 1st and 2nd webspace with adjacent hyperdense material, concerning for infection, measuring approximately 2.0 x 0.6 x 2.9 cm. Diffuse soft tissue stranding and skin thickening, nonspecific. No discrete soft tissue gas. No discrete fully retracted tendon tear. Focal soft tissue ulceration along the dorsal margin of the 1st distal phalanx. Joint: No significant degenerative changes. No osseous erosions. Peripherally enhancing collection between the 1st and 2nd digits is concerning for abscess. Adjacent hyperdense material is nonspecific. Diffuse soft tissue stranding and skin thickening is concerning for cellulitis. No discrete CT evidence of osteomyelitis. Recommend further evaluation with MRI.        Electronically signed by Kelvin Tinoco MD on 3/12/2023 at 1:24 PM

## 2023-03-13 VITALS
DIASTOLIC BLOOD PRESSURE: 64 MMHG | RESPIRATION RATE: 16 BRPM | TEMPERATURE: 98.5 F | BODY MASS INDEX: 35.82 KG/M2 | HEART RATE: 78 BPM | WEIGHT: 215 LBS | HEIGHT: 65 IN | SYSTOLIC BLOOD PRESSURE: 117 MMHG | OXYGEN SATURATION: 100 %

## 2023-03-13 LAB
CULTURE: ABNORMAL
CULTURE: ABNORMAL
Lab: ABNORMAL
SPECIMEN: ABNORMAL

## 2023-03-13 PROCEDURE — 94761 N-INVAS EAR/PLS OXIMETRY MLT: CPT

## 2023-03-13 PROCEDURE — 97116 GAIT TRAINING THERAPY: CPT

## 2023-03-13 PROCEDURE — 6370000000 HC RX 637 (ALT 250 FOR IP): Performed by: INTERNAL MEDICINE

## 2023-03-13 PROCEDURE — 6370000000 HC RX 637 (ALT 250 FOR IP): Performed by: SURGERY

## 2023-03-13 PROCEDURE — 97166 OT EVAL MOD COMPLEX 45 MIN: CPT

## 2023-03-13 PROCEDURE — 97162 PT EVAL MOD COMPLEX 30 MIN: CPT

## 2023-03-13 PROCEDURE — 6360000002 HC RX W HCPCS: Performed by: SURGERY

## 2023-03-13 PROCEDURE — 97530 THERAPEUTIC ACTIVITIES: CPT

## 2023-03-13 PROCEDURE — 97535 SELF CARE MNGMENT TRAINING: CPT

## 2023-03-13 RX ORDER — OXYCODONE HYDROCHLORIDE AND ACETAMINOPHEN 5; 325 MG/1; MG/1
1 TABLET ORAL EVERY 8 HOURS PRN
Qty: 9 TABLET | Refills: 0 | Status: SHIPPED | OUTPATIENT
Start: 2023-03-13 | End: 2023-03-16

## 2023-03-13 RX ORDER — ONDANSETRON 4 MG/1
4 TABLET, FILM COATED ORAL DAILY PRN
Qty: 10 TABLET | Refills: 0 | Status: SHIPPED | OUTPATIENT
Start: 2023-03-13

## 2023-03-13 RX ORDER — DOXYCYCLINE HYCLATE 100 MG
100 TABLET ORAL EVERY 12 HOURS SCHEDULED
Qty: 6 TABLET | Refills: 0 | Status: SHIPPED | OUTPATIENT
Start: 2023-03-13 | End: 2023-03-16

## 2023-03-13 RX ORDER — SENNA AND DOCUSATE SODIUM 50; 8.6 MG/1; MG/1
2 TABLET, FILM COATED ORAL DAILY
Qty: 30 TABLET | Refills: 0 | Status: SHIPPED | OUTPATIENT
Start: 2023-03-13

## 2023-03-13 RX ORDER — OXYCODONE HYDROCHLORIDE AND ACETAMINOPHEN 5; 325 MG/1; MG/1
1 TABLET ORAL EVERY 6 HOURS PRN
Qty: 9 TABLET | Refills: 0 | Status: SHIPPED | OUTPATIENT
Start: 2023-03-13 | End: 2023-03-13 | Stop reason: SDUPTHER

## 2023-03-13 RX ADMIN — OXYCODONE AND ACETAMINOPHEN 1 TABLET: 5; 325 TABLET ORAL at 06:12

## 2023-03-13 RX ADMIN — METRONIDAZOLE 500 MG: 250 TABLET ORAL at 06:12

## 2023-03-13 RX ADMIN — DOXYCYCLINE HYCLATE 100 MG: 100 TABLET, COATED ORAL at 11:17

## 2023-03-13 RX ADMIN — PANTOPRAZOLE SODIUM 40 MG: 40 TABLET, DELAYED RELEASE ORAL at 06:12

## 2023-03-13 RX ADMIN — SENNOSIDES AND DOCUSATE SODIUM 2 TABLET: 50; 8.6 TABLET ORAL at 11:34

## 2023-03-13 RX ADMIN — ONDANSETRON 4 MG: 4 TABLET, ORALLY DISINTEGRATING ORAL at 13:15

## 2023-03-13 RX ADMIN — ENOXAPARIN SODIUM 40 MG: 100 INJECTION SUBCUTANEOUS at 11:15

## 2023-03-13 RX ADMIN — POLYETHYLENE GLYCOL (3350) 17 G: 17 POWDER, FOR SOLUTION ORAL at 11:14

## 2023-03-13 ASSESSMENT — PAIN DESCRIPTION - ORIENTATION
ORIENTATION: RIGHT
ORIENTATION: RIGHT

## 2023-03-13 ASSESSMENT — PAIN DESCRIPTION - DESCRIPTORS
DESCRIPTORS: SHOOTING
DESCRIPTORS: STABBING;ACHING;BURNING

## 2023-03-13 ASSESSMENT — PAIN SCALES - GENERAL
PAINLEVEL_OUTOF10: 7
PAINLEVEL_OUTOF10: 6

## 2023-03-13 ASSESSMENT — PAIN DESCRIPTION - LOCATION
LOCATION: FOOT
LOCATION: FOOT

## 2023-03-13 NOTE — PROGRESS NOTES
Physical Therapy  [facility] ACUTE CARE PHYSICAL THERAPY EVALUATION  Joyce Rendon, 1990, 1116/1116-A, 3/13/2023    History  [place]:  The primary encounter diagnosis was Cellulitis of right foot. Diagnoses of Abscess and Abscess of right foot were also pertinent to this visit. Patient  has a past medical history of Asthma and Hypertension. Patient  has a past surgical history that includes Foot Debridement (Right, 3/8/2023). Subjective:  Patient states:  \"I think I fell because I didn't know how to use those crutches\"   Pain:  right foot 7/10     Communication with other providers:  CM, Handoff to RN, co-eval with [place] PERRIN + Student OTAbeba   Restrictions: heel WB with post op shoe RLE     Home Setup/Prior level of function  Social/Functional History  Lives With:  (Mother and 2 kids)  Type of Home: House  Home Layout: Two level, Laundry in basement, Bed/Bath upstairs  Home Access: Stairs to enter without rails  Entrance Stairs - Number of Steps: 4  Bathroom Shower/Tub: Tub/Shower unit  Bathroom Toilet: Standard (\"real low\")  Home Equipment: Crutches  ADL Assistance: Independent  Homemaking Assistance: Independent  Ambulation Assistance: Independent  Transfer Assistance: Independent  Active : No  Patient's  Info: mother  Occupation: Unemployed    Examination of body systems (includes body structures/functions, activity/participation limitations):  Observation:  Long sitting in bed upon arrival. Cooperative with therapy   Vision:  WFL  Hearing:  [place]  Cardiopulmonary:  stable vitals on room air     Musculoskeletal  ROM R/L:  [facility] BLEs  Strength R/L:  Minimal weakness observed in function and endurance. Mobility/treatment:   Rolling L/R:  NT   Supine to sit:  [place] with HOB elevated fully   Transfers:   Sit to stand: CGA for safety from EOB and standard toilet.  VCS for safe hand sequencing from seat surface to RW vs pulling from AD with good understanding and carry over   Stand to sit: CGA for safety to recliner and standard toilet with Vcs for hand sequencing back to seat surface for support  Step pivot: CGA for safety (2x) with VCS for sequencing full pivot turn with AD prior to initiating sit   Sitting balance: SBA for safety at EOB and toilet, static and light dynamic without UE support   Standing balance: CGA for safety at RW statically as well as at sink while performing hand hygiene tasks with single to no UE support    Gait: ~10ft + 35ft  x 2 with RW CGA for safety. VCS for step sequencing, positioning of RW in relation to CLAUDIO for inc leverage, and heel WB RLE. Pt demonstrated a dec pace with antalgic gait pattern. Minimal step length and foot clearance with slightly fwd posture and heavy reliance on RW through UES. Stairs: ascended/descended 20 steps, bilat hand rails CGA for safety. Step by step cues for sequencing gait pattern to ease pts ability to maintain heel WB through RLE. Educated pt on POC, role of PT, discharge plan, safe use of DME use including BSC, RW vs crutches, shower chair, and WC. VCS for sequencing to inc safety and indep with mobility     [facility] 6 Clicks Inpatient Mobility:  AM-PAC Inpatient Mobility Raw Score : 20    Safety: patient left in chair with alarm, call light within reach, RN notified, gait belt used. Assessment:  Pt is a 35year old female admitted with cellulitis of right foot s/p I & D right foot abscess on 3/8. Recommend home with initial 24/7 supervision and [place] PT once medically stable. At baseline she is indep with gross mobility and ADLs/IADLs. She performed well this date though with dec strength, balance, and activity tolerance as well as inc pain limiting mobility. She would benefit from continued therapy to address her current deficits, dec potential fall risk, dec burden of care, and restore PLOF. Complexity: Moderate  Prognosis: Good, no significant barriers to participation at this time.    Plan: 3-5 times per week  Discharge Recommendations: Home with Home health PT, 24 hour supervision or assist  Equipment: RW, BSC , crutches, shower chair     Goals:  Short Term Goals  Time Frame for Short Term Goals: 2 weeks  Short Term Goal 1: Pt perform transfers [place]  Short Term Goal 2: Pt ambulate 50ft with LRAD [place]  Short Term Goal 3: Pt will propel WC 150ft [place]  Short Term Goal 4: Pt will ascend/descend 1 flight of stairs, 1-2 rails SBA       Treatment plan:  Bed mobility, transfers, balance, gait, TA, TX, stairs, WC     Recommendations for NURSING mobility: ambulate with RW to the bathroom     Time:   Time in: 1029  Time out: 1102  Timed treatment minutes: 23   Total time: 33 minutes     Electronically signed by:    Sharlette Babinski, [id number]  3/13/2023, 11:36 AM

## 2023-03-13 NOTE — PROGRESS NOTES
Outpatient Pharmacy Progress Note for Meds-to-Beds    Total number of Prescriptions Filled: 4  The following medications were dispensed to the patient during the discharge process:  Oxycodone-APAP  Ondansetron  Doxycycline   Senna Plus    Additional Documentation:  Medication(s) were delivered to the patient's room prior to discharge      Thank you for letting us serve your patients.   [facility]    [address], [address] [facility]    Phone: [phone]    Fax: [phone]

## 2023-03-13 NOTE — CARE COORDINATION
Madyson Lundy was evaluated today and a DME order was entered for a standard wheelchair because she requires this to successfully complete daily living tasks of eating, bathing, toileting, personal cares, ambulating, grooming, hygiene, dressing upper body, dressing lower body, meal preparation, and taking own medications. A standard manual wheelchair is necessary due to patient's impaired ambulation and mobility restrictions and would be unable to resolve these daily living tasks using a cane or walker. The patient is capable of using a standard wheelchair safely in their home and can maneuver within their home with adequate access. There is a caregiver available to provide necessary assistance. The need for this equipment was discussed with the patient and she understands, is in agreement, and has not expressed an unwillingness to use the wheelchair. Madyson Lundy requires a bedside commode due to being confined to one level of the home, and is physically incapable of utilizing regular toilet facilities. Current body weight is Weight: 215 lb (97.5 kg). Message sent to [facility], Melissa Osorio is they are able to fill DME orders.

## 2023-03-13 NOTE — DISCHARGE SUMMARY
Discharge Summary    Name:  Madyson Lundy /Age/Sex: 1990  (35 y.o. female)   MRN & CSN:  [id number] & [id number] Admission Date/Time: 3/7/2023  9:45 AM   Attending:  Maya Becker MD Discharging Physician: Maya Becker MD       Discharge Diagnosis:  Right foot cellulitis and abscess s/p I&D  Epigastric abdominal pain, resolved  Constipation  Asthma  Hypertension  Mechanical fall      Discharge Exam  Physical Exam  Vitals:    23 2331 23 0207 23 0612 23 1100   BP:  117/64     Pulse:  71  78   Resp: 16 16 19 16   Temp:  98.4 °F (36.9 °C)  98.5 °F (36.9 °C)   TempSrc:  Oral  Oral   SpO2:  98%  100%   Weight:       Height:          Physical Exam  Vitals and nursing note reviewed. Constitutional:       General: She is not in acute distress. Appearance: She is obese. She is not ill-appearing. HENT:      Head: Normocephalic and atraumatic. Nose: Nose normal.      Mouth/Throat:      Mouth: Mucous membranes are moist.   Eyes:      Extraocular Movements: Extraocular movements intact. Pupils: Pupils are equal, round, and reactive to light. Cardiovascular:      Rate and Rhythm: Normal rate and regular rhythm. Pulses: Normal pulses. Heart sounds: Normal heart sounds. Pulmonary:      Effort: Pulmonary effort is normal.      Breath sounds: Normal breath sounds. Abdominal:      Palpations: Abdomen is soft. Musculoskeletal:         General: Normal range of motion. Cervical back: Normal range of motion. Comments: Right foot abscess post incision and drainage, covered with dressing, C/D/I   Skin:     General: Skin is warm. Capillary Refill: Capillary refill takes less than 2 seconds. Neurological:      General: No focal deficit present. Mental Status: She is alert and oriented to person, place, and time.    Psychiatric:         Mood and Affect: Mood normal.         Behavior: Behavior normal.     Hospital Course:   Madyson Lundy is a 35 y.o.  female  who presents with Cellulitis of right foot    Right foot cellulitis and abscess   - failed outpatient bactrim   - afebrile without leukocytosis, HDS  - XR R foot with diffuse soft tissue swelling along the right foot concerning for infection, no underlying osteomyelitis  - CT R foot concerning for possible abscess between first and second digit   - blood cultures drawn in ED,   - CRP elevated but Pro-Trey negative  -S/p I&D of right foot abscess 3/8.  -Cultures growing Staphylococcus aureus. Continue on p.o. doxycycline. Plan to treat for 7 days total anaerobic culture negative. DC metronidazole. -Follow-up at wound care clinic outpatient  -Home health care for dressing changes and PT OT     Epigastric abdominal pain  -Resolved     Constipation   -Continue bowel regimen      Asthma   -No signs or symptoms of acute exacerbation     Hypertension  -Continue home clonidine and amlodipine     Mechanical fall  -3/12/2023 overnight  -Patient not completely bearing weight on the right lower extremity. Got unsteady and had a mechanical fall. CT head without contrast negative for any intracranial bleeding.  -Educated patient regarding weightbearing as much as possible on the right lower extremity. Patient agreeable.  -Also requesting for wheelchair, bench and bedside commode     [employer] requires a bedside commode due to being confined to one level of the home, and is physically incapable of utilizing regular toilet facilities. Current body weight is Weight: 215 lb (97.5 kg). [employer] requires the use of a tub transfer bench to successfully and safely perform bathing and personal care. Patient is unable to transfer in and out of tub/shower combo safely without the use of this assistive device due to the diagnosis of cellulitis of the right foot. Patient has been instructed on the proper use of this equipment and in agreement to utilize.               [employer] was evaluated today and a DME order was entered for a standard wheelchair because she requires this to successfully complete daily living tasks of eating, bathing, toileting, personal cares, ambulating, grooming, hygiene, dressing upper body, dressing lower body, meal preparation, and taking own medications. A standard manual wheelchair is necessary due to patient's impaired ambulation and mobility restrictions and would be unable to resolve these daily living tasks using a cane or walker. The patient is capable of using a standard wheelchair safely in their home and can maneuver within their home with adequate access. There is a caregiver available to provide necessary assistance. The need for this equipment was discussed with the patient and she understands, is in agreement, and has not expressed an unwillingness to use the wheelchair. The patient expressed appropriate understanding of and agreement with the discharge recommendations, medications, and plan. Consults this admission:  PHARMACY TO DOSE VANCOMYCIN  PHARMACY TO DOSE VANCOMYCIN  IP CONSULT TO GENERAL SURGERY  IP CONSULT TO IV TEAM  IP CONSULT TO IV TEAM  IP CONSULT TO HOME CARE NEEDS      Discharge Instruction:   Handoff to PCP:     Follow up appointments: PCP, wound care  Primary care physician:     Diet:  regular diet   Activity: activity as tolerated  Disposition: Discharged to:   [x]Home, [][facility], []SNF, []Acute Rehab, []Hospice   Condition on discharge: Stable    Discharge Medications:        Medication List        START taking these medications      doxycycline hyclate 100 MG tablet  Commonly known as: VIBRA-TABS  Take 1 tablet by mouth every 12 hours for 3 days     oxyCODONE-acetaminophen 5-325 MG per tablet  Commonly known as: PERCOCET  Take 1 tablet by mouth every 6 hours as needed for Pain for up to 3 days.  Max Daily Amount: 4 tablets     sennosides-docusate sodium 8.6-50 MG tablet  Commonly known as: SENOKOT-S  Take 2 tablets by mouth daily CONTINUE taking these medications      acetaminophen 500 MG tablet  Commonly known as: TYLENOL     amLODIPine 10 MG tablet  Commonly known as: NORVASC     cloNIDine 0.2 MG tablet  Commonly known as: CATAPRES     hydrOXYzine HCl 25 MG tablet  Commonly known as: ATARAX     Strattera 80 MG capsule  Generic drug: atomoxetine     Trintellix 10 MG Tabs tablet  Generic drug: VORTIoxetine            STOP taking these medications      ibuprofen 200 MG tablet  Commonly known as: ADVIL;MOTRIN     sulfamethoxazole-trimethoprim 800-160 MG per tablet  Commonly known as: Bactrim DS               Where to Get Your Medications        These medications were sent to [address] [place] [address] 25, [facility]      Phone: [phone]   doxycycline hyclate 100 MG tablet  oxyCODONE-acetaminophen 5-325 MG per tablet  sennosides-docusate sodium 8.6-50 MG tablet         Objective Findings at Discharge:       BMP/CBC  No results for input(s): NA, K, CL, CO2, BUN, CREATININE, GLU, WBC, HEMOGLOBIN, HCT, PLT in the last 72 hours. IMAGING:      Additional Information: Patient seen and examined day of discharge.  For more information regarding patient's care please contact [facility] records [phone]    Discharge Time of 35 minutes    Electronically signed by Jaki Bradford MD on 3/13/2023 at 12:38 PM

## 2023-03-13 NOTE — CARE COORDINATION
Carlene Galaviz with [facility] [employer] advised this RN case manager that wheelchair will need a precert. Dr. Delmar Bernard advised and asked to order standard wheelchair instead of light weight wheel chair. Pt would also like aq walker.  I have added numbers to call for wheelchair in patient's AVS.

## 2023-03-13 NOTE — PROGRESS NOTES
Occupational [facility] ACUTE CARE OCCUPATIONAL THERAPY EVALUATION    Alireza Germain, 1990, 1116/1116-A, 3/13/2023    Discharge Recommendation: Home with initial 24 hour supervision + Home Health OT services      History:  Rob[place]:  The primary encounter diagnosis was Cellulitis of right foot. Diagnoses of Abscess and Abscess of right foot were also pertinent to this visit. Subjective:  Patient states: \"I'm determined, I have to get back to my kids! \"  Pain: 7/10 R foot pain  Communication with other providers: co-eval w/ PT Ofe BEST  Restrictions: General Precautions, Fall Risk, Heel WB R LE w/ post op shoe     Home Setup/Prior level of function:  Social/Functional History  Lives With:  (Mother and 2 kids)  Type of Home: House  Home Layout: Two level, Laundry in basement, Bed/Bath upstairs  Home Access: Stairs to enter without rails  Entrance Stairs - Number of Steps: 4  Bathroom Shower/Tub: Tub/Shower unit  Bathroom Toilet: Standard (\"real low\")  Home Equipment: Crutches  ADL Assistance: Independent  Homemaking Assistance: Independent  Ambulation Assistance: Independent (typically no AD)  Transfer Assistance: Independent  Active : No  Patient's  Info: mother  Occupation: Unemployed    Examination:  Observation: Supine in bed upon arrival. Pt agreeable to evaluation.   Vision: WFL  Hearing: WFL  Vitals: Stable vitals throughout session on room air     Body Systems and functions:  ROM: WFL in BL UEs   Strength: 4+/5 MMT In BL UEs observed functionally   Sensation: WFL (denies numbness/tingling)  Tone: Normal  Coordination: WFL  Perception: WNL    Activities of Daily Living (ADLs):  Feeding: Independent   Grooming: CGA (while standing; pt able to complete hand and facial hygiene standing at sink CGA this date)  UB bathing: SBA   LB bathing: CGA (for steadying assist to reach buttocks)  UB dressing: SBA  LB dressing: CGA (for steadying assist while managing clothing to hips)  Toileting: CGA (for steadying assist; pt had BM while seated on toilet, pt able to manage clothing both directions CGA and ian care w/o assist)    Cognitive and Psychosocial Functioning:  Overall cognitive status: WNL- cues for sequencing and hand placement w/ RW (pt demo good carryover throughout session)  Affect: Normal     Balance:   Sitting: SBA sitting unsupported EOB   Standing: CGA w/ RW     Functional Mobility:  Bed Mobility: SBA w/ HOB elevated supine to sitting EOB   Transfers: CGA sit to stand from EOB, CGA to/from standard toilet, CGA stand to sit to chair (cues for sequencing and safe hand placement w/ AD- pt demo good carry over w/ time throughout session)    Ambulation: CGA w/ RW ~45 ft (cues for sequencing and positioning RW) (see PT eval for gait assessment)      AM-PAC 6 click short form for inpatient daily activity:   How much help from another person does the patient currently need. .. Unable  Dep A Lot  Max A A Lot   Mod A A Little  Min A A Little   CGA  SBA None   Mod I  Indep  Sup   1. Putting on and taking off regular lower body clothing? [] 1    [] 2   [] 2   [] 3   [x] 3   [] 4      2. Bathing (including washing, rinsing, drying)? [] 1   [] 2   [] 2 [] 3 [x] 3 [] 4   3. Toileting, which includes using toilet, bedpan, or urinal? [] 1    [] 2   [] 2   [] 3   [x] 3   [] 4     4. Putting on and taking off regular upper body clothing? [] 1   [] 2   [] 2   [] 3   [x] 3    [] 4      5. Taking care of personal grooming such as brushing teeth? [] 1   [] 2    [] 2 [] 3    [x] 3   [] 4      6. Eating meals? [] 1   [] 2   [] 2   [] 3   [] 3   [x] 4      Raw Score:  19     [24=0% impaired(CH), 23=1-19%(CI), 20-22=20-39%(CJ), 15-19=40-59%(CK), 10-14=60-79%(CL), 7-9=80-99%(CM), 6=100%(CN)]     Treatment:  Self Care Training:   Cues were given for safety, sequence, UE/LE placement, visual cues, and balance.     Activities performed today included toileting, hand and facial hygiene at sink      Safety Measures: Gait belt used, Left in Chair, Alarm in place    Assessment:  Pt is a 34 yo F w/ a past medical history of Asthma and Hypertension. Pt admitted w/ cellulitis of R foot and underwent R foot abscess I & D on 3/8. Pt lives w/ her mother and two children (mother has a lot of health difficulties). Pt reports at baseline she is fully IND w/ ADLs, IADLs, and functional mobility (typically no AD). Pt presents with the above impairments and would benefit from continued acute care OT services. Pt did well this date and from an ADL, functional mobility, and safety standpoint would be safe to return home w/ initial 24 hr supervision and initial [facility] OT. Complexity: Moderate  Prognosis: Good  Plan: 2+x/week      Goals:  1. Pt will complete all aspects of bed mobility for EOB/OOB ADLs Mod I w/ HOB flat  2. Pt will complete UB/LB bathing SBA  3. Pt will complete all aspects of LB dressing SBA  4. Pt will complete all functional transfers to and from bed, chair, toilet, shower chair SBA  5. Pt will ambulate HH distance to bathroom for toileting SBA w/ RW  6. Pt will complete all aspects of toileting task SBA   7. Pt will complete oral hygiene/grooming routine in standing at sink SBA   8.  Pt will complete ther ex/ther act with focus on dynamic standing balance >8 minutes for ADL tasks            Time:   Time in: 1029  Time out: 1102  Timed treatment minutes: 18  Total time: 33      Electronically signed by:    Justino Cox S/OT     Mari Corral OTR/L, [facility], .[phone]

## 2023-03-14 ENCOUNTER — TELEPHONE (OUTPATIENT)
Dept: BARIATRICS/WEIGHT MGMT | Age: 33
End: 2023-03-14

## 2023-03-14 ENCOUNTER — APPOINTMENT (OUTPATIENT)
Dept: CT IMAGING | Age: 33
End: 2023-03-14
Payer: COMMERCIAL

## 2023-03-14 ENCOUNTER — HOSPITAL ENCOUNTER (EMERGENCY)
Age: 33
Discharge: HOME | End: 2023-03-14
Attending: EMERGENCY MEDICINE
Payer: COMMERCIAL

## 2023-03-14 VITALS
RESPIRATION RATE: 16 BRPM | TEMPERATURE: 97.8 F | HEART RATE: 95 BPM | SYSTOLIC BLOOD PRESSURE: 146 MMHG | DIASTOLIC BLOOD PRESSURE: 83 MMHG | OXYGEN SATURATION: 98 %

## 2023-03-14 DIAGNOSIS — L03.115 CELLULITIS OF RIGHT FOOT: Primary | ICD-10-CM

## 2023-03-14 LAB
ALBUMIN SERPL-MCNC: 4.1 GM/DL (ref 3.4–5)
ALP BLD-CCNC: 99 IU/L (ref 40–128)
ALT SERPL-CCNC: 64 U/L (ref 10–40)
ANION GAP SERPL CALCULATED.3IONS-SCNC: 10 MMOL/L (ref 4–16)
AST SERPL-CCNC: 50 IU/L (ref 15–37)
BASOPHILS ABSOLUTE: 0.1 K/CU MM
BASOPHILS RELATIVE PERCENT: 0.6 % (ref 0–1)
BILIRUB SERPL-MCNC: 0.2 MG/DL (ref 0–1)
BUN SERPL-MCNC: 15 MG/DL (ref 6–23)
CALCIUM SERPL-MCNC: 9.4 MG/DL (ref 8.3–10.6)
CHLORIDE BLD-SCNC: 103 MMOL/L (ref 99–110)
CO2: 25 MMOL/L (ref 21–32)
CREAT SERPL-MCNC: 0.8 MG/DL (ref 0.6–1.1)
DIFFERENTIAL TYPE: ABNORMAL
EOSINOPHILS ABSOLUTE: 0.3 K/CU MM
EOSINOPHILS RELATIVE PERCENT: 2.8 % (ref 0–3)
GFR SERPL CREATININE-BSD FRML MDRD: >60 ML/MIN/1.73M2
GLUCOSE SERPL-MCNC: 94 MG/DL (ref 70–99)
HCG QUALITATIVE: NEGATIVE
HCT VFR BLD CALC: 39.5 % (ref 37–47)
HEMOGLOBIN: 13.4 GM/DL (ref 12.5–16)
IMMATURE NEUTROPHIL %: 0.3 % (ref 0–0.43)
LYMPHOCYTES ABSOLUTE: 2.2 K/CU MM
LYMPHOCYTES RELATIVE PERCENT: 24.6 % (ref 24–44)
MCH RBC QN AUTO: 30.7 PG (ref 27–31)
MCHC RBC AUTO-ENTMCNC: 33.9 % (ref 32–36)
MCV RBC AUTO: 90.4 FL (ref 78–100)
MONOCYTES ABSOLUTE: 1 K/CU MM
MONOCYTES RELATIVE PERCENT: 11.1 % (ref 0–4)
NUCLEATED RBC %: 0 %
PDW BLD-RTO: 11.9 % (ref 11.7–14.9)
PLATELET # BLD: 258 K/CU MM (ref 140–440)
PMV BLD AUTO: 9.8 FL (ref 7.5–11.1)
POTASSIUM SERPL-SCNC: 4.5 MMOL/L (ref 3.5–5.1)
RBC # BLD: 4.37 M/CU MM (ref 4.2–5.4)
SEGMENTED NEUTROPHILS ABSOLUTE COUNT: 5.4 K/CU MM
SEGMENTED NEUTROPHILS RELATIVE PERCENT: 60.6 % (ref 36–66)
SODIUM BLD-SCNC: 138 MMOL/L (ref 135–145)
TOTAL IMMATURE NEUTOROPHIL: 0.03 K/CU MM
TOTAL NUCLEATED RBC: 0 K/CU MM
TOTAL PROTEIN: 7.1 GM/DL (ref 6.4–8.2)
WBC # BLD: 8.9 K/CU MM (ref 4–10.5)

## 2023-03-14 PROCEDURE — 96375 TX/PRO/DX INJ NEW DRUG ADDON: CPT

## 2023-03-14 PROCEDURE — 73701 CT LOWER EXTREMITY W/DYE: CPT

## 2023-03-14 PROCEDURE — 84703 CHORIONIC GONADOTROPIN ASSAY: CPT

## 2023-03-14 PROCEDURE — 96374 THER/PROPH/DIAG INJ IV PUSH: CPT

## 2023-03-14 PROCEDURE — 80053 COMPREHEN METABOLIC PANEL: CPT

## 2023-03-14 PROCEDURE — 6370000000 HC RX 637 (ALT 250 FOR IP): Performed by: EMERGENCY MEDICINE

## 2023-03-14 PROCEDURE — 99285 EMERGENCY DEPT VISIT HI MDM: CPT

## 2023-03-14 PROCEDURE — 6360000002 HC RX W HCPCS: Performed by: EMERGENCY MEDICINE

## 2023-03-14 PROCEDURE — 6360000004 HC RX CONTRAST MEDICATION: Performed by: EMERGENCY MEDICINE

## 2023-03-14 PROCEDURE — 85025 COMPLETE CBC W/AUTO DIFF WBC: CPT

## 2023-03-14 RX ORDER — MORPHINE SULFATE 4 MG/ML
4 INJECTION, SOLUTION INTRAMUSCULAR; INTRAVENOUS ONCE
Status: COMPLETED | OUTPATIENT
Start: 2023-03-14 | End: 2023-03-14

## 2023-03-14 RX ORDER — ONDANSETRON 2 MG/ML
4 INJECTION INTRAMUSCULAR; INTRAVENOUS ONCE
Status: COMPLETED | OUTPATIENT
Start: 2023-03-14 | End: 2023-03-14

## 2023-03-14 RX ORDER — OXYCODONE HYDROCHLORIDE AND ACETAMINOPHEN 5; 325 MG/1; MG/1
2 TABLET ORAL ONCE
Status: COMPLETED | OUTPATIENT
Start: 2023-03-14 | End: 2023-03-14

## 2023-03-14 RX ADMIN — IOPAMIDOL 75 ML: 755 INJECTION, SOLUTION INTRAVENOUS at 03:55

## 2023-03-14 RX ADMIN — ONDANSETRON 4 MG: 2 INJECTION INTRAMUSCULAR; INTRAVENOUS at 03:26

## 2023-03-14 RX ADMIN — MORPHINE SULFATE 4 MG: 4 INJECTION, SOLUTION INTRAMUSCULAR; INTRAVENOUS at 03:27

## 2023-03-14 RX ADMIN — OXYCODONE AND ACETAMINOPHEN 2 TABLET: 5; 325 TABLET ORAL at 07:32

## 2023-03-14 ASSESSMENT — PAIN DESCRIPTION - DESCRIPTORS: DESCRIPTORS: BURNING

## 2023-03-14 ASSESSMENT — PAIN DESCRIPTION - ORIENTATION: ORIENTATION: RIGHT

## 2023-03-14 ASSESSMENT — PAIN SCALES - GENERAL: PAINLEVEL_OUTOF10: 8

## 2023-03-14 ASSESSMENT — PAIN DESCRIPTION - LOCATION: LOCATION: FOOT

## 2023-03-14 NOTE — ED PROVIDER NOTES
Patient signed out to me by Dr. Mildred Tipton,    33yof with recent cellulitis and abscess on the foot, admitted and I&D by Dr. Hailey Perez in hospital. On doxycycline and pain meds at home, was just discharged from hospital yesterday. Worsening pain, was walking and it started bleeding. Awaiting CT for disposition. Vitals and labs reassuring. Cornelius Dyson MD  03/14/23 1636      ED Course as of 03/14/23 0734   Tue Mar 14, 2023   0714 Called radiology- we had not received results for CT. Subspeciality read- no evidence of fracture or dislocation, no osseitis. Soft tissue edema identified, s/p I&D between first and second digits, no residual fluid collection seen. No subcutaneous emphysema. Johnnie Macias      ED Course User Katheryn Manzanares MD     Spoke with patient, she has been doing well. She was having a lot of pain and that is basically why she came in. We will give her another dose of pain medication here and repacked the wound, and then have her follow-up with her general surgeon, I have given her his information. She does have antibiotics as well as pain medications and ondansetron for home her discharge from the hospital yesterday. She is comfortable with this, all questions answered, discharged in stable condition.        Cornelius Dyson MD  03/14/23 [id number]

## 2023-03-14 NOTE — ED TRIAGE NOTES
Patient here for wound check to her right foot great toe. She was released from this hospital yesterday at 1405. She is reporting pain and some drainage from her wound. She is currently on antibiotics.

## 2023-03-14 NOTE — ED PROVIDER NOTES
[address] ENCOUNTER      Pt Name: Eder Arredondo  MRN: [id number]  [place] 1990  Date of evaluation: 3/14/2023  Provider: Jarad Carlos MD    CHIEF COMPLAINT       Chief Complaint   Patient presents with    Foot Injury         HISTORY OF PRESENT ILLNESS      Eder Arredondo is a 35 y.o. female who presents to the emergency department  for   Chief Complaint   Patient presents with    Foot Injury       Thanks 78-year-old female presents complaining of worsening right foot pain. She was discharged from the hospital 1 day ago after having been admitted for cellulitis in the right foot. She also went incision and drainage of an abscess between her first and second toes on the right foot. That procedure was done by Dr. Pallavi Whitaker. She was sent home on doxycycline. She reports that she has been walking on her heel today. She endorses some bloody drainage from between her first and second toe as well as worsened pain and so comes to the emergency department. Denies any fever or chills. She has not changed the dressing on her wound. She does present ambulatory. Denies any weakness in the right leg. No fevers, chills or other constitutional infectious symptoms        Nursing Notes, Triage Notes & Vital Signs were reviewed. REVIEW OF SYSTEMS    (2-9 systems for level 4, 10 or more for level 5)     Review of Systems   Skin:  Positive for wound. Except as noted above the remainder of the review of systems was reviewed and negative. PAST MEDICAL HISTORY     Past Medical History:   Diagnosis Date    Asthma     Hypertension        Prior to Admission medications    Medication Sig Start Date End Date Taking?  Authorizing Provider   sennosides-docusate sodium (SENOKOT-S) 8.6-50 MG tablet Take 2 tablets by mouth daily 3/13/23   Harshad St MD   doxycycline hyclate (VIBRA-TABS) 100 MG tablet Take 1 tablet by mouth every 12 hours for 3 days 3/13/23 3/16/23  Miguel Agudelo MD   ondansetron [facility]) 4 MG tablet Take 1 tablet by mouth daily as needed for Nausea or Vomiting 3/13/23   Miguel Agudelo MD   oxyCODONE-acetaminophen (PERCOCET) 5-325 MG per tablet Take 1 tablet by mouth every 8 hours as needed for Pain for up to 3 days.  Max Daily Amount: 3 tablets 3/13/23 3/16/23  Miguel Agudelo MD   amLODIPine (NORVASC) 10 MG tablet Take 10 mg by mouth daily 2/21/23   Historical Provider, MD   cloNIDine (CATAPRES) 0.2 MG tablet Take 0.2 mg by mouth daily 03/07/23 prescription is for BID patient states she usually only takes once daily 2/15/23   Historical Provider, MD   TRINTELLIX 10 MG TABS tablet Take 10 mg by mouth daily 2/15/23   Historical Provider, MD   hydrOXYzine HCl (ATARAX) 25 MG tablet Take 1-2 tablets by mouth nightly as needed 1/26/23   Historical Provider, MD   atomoxetine (STRATTERA) 80 MG capsule Take 80 mg by mouth daily    Historical Provider, MD   acetaminophen (TYLENOL) 500 MG tablet Take 500 mg by mouth every 6 hours as needed for Pain    Historical Provider, MD        Patient Active Problem List   Diagnosis    Cellulitis of right foot    Abscess of right foot         SURGICAL HISTORY       Past Surgical History:   Procedure Laterality Date    FOOT DEBRIDEMENT Right 3/8/2023    FOOT INCISION AND DRAINAGE performed by Yamilex Corrigan DO at [facility]       Previous Medications    ACETAMINOPHEN (TYLENOL) 500 MG TABLET    Take 500 mg by mouth every 6 hours as needed for Pain    AMLODIPINE (NORVASC) 10 MG TABLET    Take 10 mg by mouth daily    ATOMOXETINE (STRATTERA) 80 MG CAPSULE    Take 80 mg by mouth daily    CLONIDINE (CATAPRES) 0.2 MG TABLET    Take 0.2 mg by mouth daily 03/07/23 prescription is for BID patient states she usually only takes once daily    DOXYCYCLINE HYCLATE (VIBRA-TABS) 100 MG TABLET    Take 1 tablet by mouth every 12 hours for 3 days    HYDROXYZINE HCL (ATARAX) 25 MG TABLET    Take 1-2 tablets by mouth nightly as needed    ONDANSETRON (ZOFRAN) 4 MG TABLET    Take 1 tablet by mouth daily as needed for Nausea or Vomiting    OXYCODONE-ACETAMINOPHEN (PERCOCET) 5-325 MG PER TABLET    Take 1 tablet by mouth every 8 hours as needed for Pain for up to 3 days. Max Daily Amount: 3 tablets    SENNOSIDES-DOCUSATE SODIUM (SENOKOT-S) 8.6-50 MG TABLET    Take 2 tablets by mouth daily    TRINTELLIX 10 MG TABS TABLET    Take 10 mg by mouth daily       ALLERGIES     Adhesive tape    FAMILY HISTORY     No family history on file. SOCIAL HISTORY       Social History     Socioeconomic History    Marital status: Single   Tobacco Use    Smoking status: Every Day     Packs/day: 0.75     Types: Cigarettes    Smokeless tobacco: Never   Substance and Sexual Activity    Alcohol use: Not Currently    Drug use: Not Currently       SCREENINGS    Lesage Coma Scale  Eye Opening: Spontaneous  Best Verbal Response: Oriented  Best Motor Response: Obeys commands  Janeth Coma Scale Score: 15          PHYSICAL EXAM    (up to 7 for level 4, 8 or more for level 5)     ED Triage Vitals [03/14/23 0249]   BP Temp Temp Source Heart Rate Resp SpO2 Height Weight   (!) 146/83 97.8 °F (36.6 °C) Oral 95 16 98 % -- --       Physical Exam  Vitals reviewed. Constitutional:       Appearance: She is not ill-appearing or toxic-appearing. HENT:      Head: Normocephalic and atraumatic. Nose: No congestion or rhinorrhea. Mouth/Throat:      Mouth: Mucous membranes are moist.      Pharynx: No oropharyngeal exudate or posterior oropharyngeal erythema. Eyes:      General:         Right eye: No discharge. Left eye: No discharge. Extraocular Movements: Extraocular movements intact. Pupils: Pupils are equal, round, and reactive to light. Cardiovascular:      Rate and Rhythm: Normal rate. Heart sounds: No friction rub. No gallop.    Pulmonary:      Effort: Pulmonary effort is normal. No respiratory distress. Breath sounds: No stridor. Abdominal:      Palpations: Abdomen is soft. Tenderness: There is no abdominal tenderness. There is no guarding. Musculoskeletal:      Cervical back: Normal range of motion and neck supple. Skin:     Findings: Erythema present. Comments: Diffuse erythema swelling right foot, worse on right lateral foot, packed wound, no active drainage   Neurological:      General: No focal deficit present. Mental Status: She is alert and oriented to person, place, and time. DIAGNOSTIC RESULTS     Labs Reviewed   COMPREHENSIVE METABOLIC PANEL - Abnormal; Notable for the following components:       Result Value    ALT 64 (*)     AST 50 (*)     All other components within normal limits   CBC WITH AUTO DIFFERENTIAL - Abnormal; Notable for the following components:    Monocytes % 11.1 (*)     All other components within normal limits   HCG, SERUM, QUALITATIVE            RADIOLOGY:     Non-plain film images such as CT, Ultrasound and MRI are read by the radiologist. Plain radiographic images are visualized and preliminarily interpreted by the emergency physician. Interpretation per the Radiologist below, if available at the time of this note:    CT FOOT RIGHT W CONTRAST    (Results Pending)         ED BEDSIDE ULTRASOUND:   Performed by ED Physician Milvia Austin MD       LABS:  Labs Reviewed   COMPREHENSIVE METABOLIC PANEL - Abnormal; Notable for the following components:       Result Value    ALT 64 (*)     AST 50 (*)     All other components within normal limits   CBC WITH AUTO DIFFERENTIAL - Abnormal; Notable for the following components:    Monocytes % 11.1 (*)     All other components within normal limits   HCG, SERUM, QUALITATIVE       All other labs were within normal range or not returned as of this dictation.     EMERGENCY DEPARTMENT COURSE and DIFFERENTIAL DIAGNOSIS/MDM:   Vitals:    Vitals:    03/14/23 0249   BP: (!) 146/83   Pulse: 95 Resp: 16   Temp: 97.8 °F (36.6 °C)   TempSrc: Oral   SpO2: 98%           MDM  Number of Diagnoses or Management Options  Cellulitis of right foot  Diagnosis management comments: 71-year-old female presents with right foot pain. She was discharged from [facility] 1 day ago after having been admitted for cellulitis of the right foot. She did undergo an incision and drainage of an abscess between the first and second toes on March 8, 2023. That surgery was done by Dr. Maris Bain. She was discharged home on oral pain meds as well as doxycycline. She reports she was walking on her heel today and she has developed some worsened pain. She also endorses some drainage that is bloody. Denies any fevers or chills. She presents by squad. She presents with elevated blood pressure. Vitals otherwise unremarkable. She is afebrile. No tachycardia. Respirations are within normal limits. Her ox saturations are in the 90s on room air. On exam she does have some diffuse erythema and swelling is worse in the lateral side of the right foot on the dorsal side. She does have a packed wound. No active drainage noted. She is moving the toes well. Grossly the right lower extremities neurovascular intact. CMP and CBC obtained. I am obtaining a contrasted CT scan of the foot to evaluate for any signs of postoperative complication. She is treated with IV morphine and IV Zofran.    5:24 AM    There were significant delays in radiology at this time. Her CT scan is still pending. She will be signed out to oncoming physician will follow results of CT scan and make final disposition. She remains hemodynamically stable here in the emergency department. Her labs overall nonacute. No leukocytosis. She is on oral antibiotics at home. She also has oral pain medicines at home. She was treated with a dose of IV morphine as well as IV Zofran here in the emergency department.       IDr. Cheryl Saira Encinas am the primary physician of record. -  Patient seen and evaluated in the emergency department. -  Triage and nursing notes reviewed and incorporated. -  Old chart records reviewed and incorporated. -  Work-up included:  See above  -  Results discussed with patient. CONSULTS:  None    PROCEDURES:  None performed unless otherwise noted below     Procedures        FINAL IMPRESSION      1. Cellulitis of right foot          DISPOSITION/PLAN   DISPOSITION        PATIENT REFERRED TO:  No follow-up provider specified. DISCHARGE MEDICATIONS:  New Prescriptions    No medications on file       ED Provider Disposition Time  DISPOSITION        Appropriate personal protective equipment was worn during the patient's evaluation. These included surgical, eye protection, surgical mask or in 95 respirator and gloves. The patient was also placed in a surgical mask for the prevention of possible spread of respiratory viral illnesses. The Patient was instructed to read the package inserts with any medication that was prescribed. Major potential reactions and medication interactions were discussed. The Patient understands that there are numerous possible adverse reactions not covered. The patient was also instructed to arrange follow-up with his or her primary care provider for review of any pending labwork or incidental findings on any radiology results that were obtained. All efforts were made to discuss any incidental findings that require further monitoring. Controlled Substances Monitoring:     No flowsheet data found.     (Please note that portions of this note were completed with a voice recognition program.  Efforts were made to edit the dictations but occasionally words are mis-transcribed.)    Driss Le MD (electronically signed)  Attending Emergency Physician           Driss Le MD  03/14/23 8966

## 2023-03-14 NOTE — ED NOTES
Discharge instructions reviewed with patient and family. Pt expressed understanding. Pt denies any further questions at this time. Pt&ox4, respiration unlabored. Pt wheeled from dept to await for transport.         Ricki Claire RN  03/14/23 2053

## 2023-03-15 NOTE — CARE COORDINATION
Asked by nursing if hhc was set up prior to dc. Noted that referral was sent to [facility] and the [facility] nurse called and they said they did receive the referral and will contact a nurse to see the pt. Pt given [facility] number to f/u if no one contacts her.

## 2023-03-20 ENCOUNTER — TELEPHONE (OUTPATIENT)
Dept: SURGERY | Age: 33
End: 2023-03-20

## 2023-03-22 ENCOUNTER — OFFICE VISIT (OUTPATIENT)
Dept: SURGERY | Age: 33
End: 2023-03-22

## 2023-03-22 VITALS — BODY MASS INDEX: 36.89 KG/M2 | WEIGHT: 221.4 LBS | HEART RATE: 105 BPM | OXYGEN SATURATION: 96 % | HEIGHT: 65 IN

## 2023-03-22 DIAGNOSIS — L03.115 CELLULITIS OF RIGHT FOOT: Primary | ICD-10-CM

## 2023-03-22 DIAGNOSIS — G89.18 POST-OP PAIN: Primary | ICD-10-CM

## 2023-03-22 PROCEDURE — 99024 POSTOP FOLLOW-UP VISIT: CPT | Performed by: SURGERY

## 2023-03-22 RX ORDER — FLUCONAZOLE 150 MG/1
150 TABLET ORAL ONCE
Qty: 1 TABLET | Refills: 0 | Status: SHIPPED | OUTPATIENT
Start: 2023-03-22 | End: 2023-03-22

## 2023-03-22 RX ORDER — HYDROCODONE BITARTRATE AND ACETAMINOPHEN 5; 325 MG/1; MG/1
1 TABLET ORAL EVERY 6 HOURS PRN
Qty: 15 TABLET | Refills: 0 | Status: SHIPPED | OUTPATIENT
Start: 2023-03-22 | End: 2023-03-27

## 2023-03-22 ASSESSMENT — PATIENT HEALTH QUESTIONNAIRE - PHQ9
SUM OF ALL RESPONSES TO PHQ QUESTIONS 1-9: 0
SUM OF ALL RESPONSES TO PHQ9 QUESTIONS 1 & 2: 0
2. FEELING DOWN, DEPRESSED OR HOPELESS: 0
SUM OF ALL RESPONSES TO PHQ QUESTIONS 1-9: 0
1. LITTLE INTEREST OR PLEASURE IN DOING THINGS: 0

## 2023-03-22 NOTE — PROGRESS NOTES
Chief Complaint   Patient presents with    Post-Op Check     1st PO RT Foot I&D @ [facility] 3/8/23         SUBJECTIVE:  Patient here for post op visit. Swelling is improved. Notes pain over area of of abscess. Has been doing dressing changes. Past Surgical History:   Procedure Laterality Date    FOOT DEBRIDEMENT Right 3/8/2023    FOOT INCISION AND DRAINAGE performed by Opal Sarabia DO at [facility] OR     Past Medical History:   Diagnosis Date    Asthma     Hypertension      No family history on file. Social History     Socioeconomic History    Marital status: Single     Spouse name: Not on file    Number of children: Not on file    Years of education: Not on file    Highest education level: Not on file   Occupational History    Not on file   Tobacco Use    Smoking status: Every Day     Packs/day: 0.75     Types: Cigarettes    Smokeless tobacco: Never   Substance and Sexual Activity    Alcohol use: Not Currently    Drug use: Not Currently    Sexual activity: Not on file   Other Topics Concern    Not on file   Social History Narrative    Not on file     Social Determinants of Health     Financial Resource Strain: Not on file   Food Insecurity: Not on file   Transportation Needs: Not on file   Physical Activity: Not on file   Stress: Not on file   Social Connections: Not on file   Intimate Partner Violence: Not on file   Housing Stability: Not on file       OBJECTIVE:    General: A&O x3  Respiratory: Chest rise equal bilaterally  CV: Regular rate and rhythm  Abdomen: Soft, nontender, nondistended, no rebound or guarding. Right foot with improved edema. Wound with packing in place. ASSESSMENT:    1. Right foot I&D 3/8         PLAN:    Continue dressing changes. We will have patient continue to follow in wound care clinic. No orders of the defined types were placed in this encounter.        Orders Placed This Encounter   Medications    HYDROcodone-acetaminophen (NORCO) 5-325 MG per tablet
Statement Selected

## 2023-03-28 ENCOUNTER — TELEPHONE (OUTPATIENT)
Dept: SURGERY | Age: 33
End: 2023-03-28

## 2023-03-28 ENCOUNTER — HOSPITAL ENCOUNTER (OUTPATIENT)
Dept: WOUND CARE | Age: 33
Discharge: HOME | End: 2023-03-28
Payer: COMMERCIAL

## 2023-03-28 VITALS — HEART RATE: 99 BPM | DIASTOLIC BLOOD PRESSURE: 86 MMHG | TEMPERATURE: 98.7 F | SYSTOLIC BLOOD PRESSURE: 149 MMHG

## 2023-03-28 DIAGNOSIS — L02.611 ABSCESS OF RIGHT FOOT: Primary | ICD-10-CM

## 2023-03-28 PROCEDURE — 11042 DBRDMT SUBQ TIS 1ST 20SQCM/<: CPT

## 2023-03-28 PROCEDURE — 11042 DBRDMT SUBQ TIS 1ST 20SQCM/<: CPT | Performed by: SURGERY

## 2023-03-28 PROCEDURE — 99213 OFFICE O/P EST LOW 20 MIN: CPT

## 2023-03-28 ASSESSMENT — PAIN - FUNCTIONAL ASSESSMENT: PAIN_FUNCTIONAL_ASSESSMENT: ACTIVITIES ARE NOT PREVENTED

## 2023-03-28 ASSESSMENT — PAIN DESCRIPTION - LOCATION: LOCATION: FOOT

## 2023-03-28 ASSESSMENT — PAIN DESCRIPTION - DESCRIPTORS: DESCRIPTORS: BURNING

## 2023-03-28 ASSESSMENT — PAIN SCALES - GENERAL: PAINLEVEL_OUTOF10: 8

## 2023-03-28 ASSESSMENT — PAIN DESCRIPTION - PAIN TYPE: TYPE: CHRONIC PAIN

## 2023-03-28 ASSESSMENT — PAIN DESCRIPTION - FREQUENCY: FREQUENCY: INTERMITTENT

## 2023-03-28 ASSESSMENT — PAIN DESCRIPTION - ORIENTATION: ORIENTATION: RIGHT

## 2023-03-28 NOTE — TELEPHONE ENCOUNTER
----- Message from Gloria Cassidy MD sent at 4/10/2018  7:23 AM CDT -----  Benign polyp, s/p D&C   Per Yousuf with occupational therapy - patient politely denied and patient knows how to schedule if she changes her mind. Patient told Yousuf that \" there is no need for eval and treatment. \"

## 2023-03-28 NOTE — DISCHARGE INSTRUCTIONS
PHYSICIAN ORDERS AND DISCHARGE INSTRUCTIONS    NOTE: Upon discharge from the [address], you will receive a patient experience survey. We would be grateful if you would take the time to fill this survey out. Wound care order history:     CAIN's   Right       Left    Date:   Cultures:     Grafts:     Antibiotics:        Continuing wound care orders and information:                Residence:                Continue home health care with:    Your wound-care supplies will be provided by: Wound cleansing:     Do not scrub or use excessive force. Wash hands with soap and water before and after dressing changes. Prior to applying a clean dressing, cleanse wound with normal saline, wound cleanser, or mild soap and water. Ask the physician or nurse before getting the wound(s) wet in a shower. General Wound management:   Keep weight off wounds and reposition every 2 hours. Avoid standing for long periods of time. Apply wraps/stockings in AM and remove at bedtime. If swelling is present, elevate legs to the level of the heart or above for 30 minutes 4-5 times a day and/or when sitting. When taking antibiotics take entire prescription as ordered by physician do not stop taking until medicine is all gone. Orders for this week: 03/28/2023    FAX ORDERS TO HOME CARE!     --need to find out which agency    Rx:    Right Foot - Wash with soap and water, pat dry. Apply Stimulen and Anasept to wound bed. Cover with 2x2 and wrap with conform and coban. Leave in place until home health on Thursday. Follow up with Dr Keila Scott in 1 week(s) in the wound care center. Call [phone] for any questions or concerns.   Date__________   Time____________

## 2023-03-28 NOTE — PROGRESS NOTES
which one) Anterior;Right Right Great Toe/2nd Toe Space (Active)   Wound Image   03/28/23 0831   Wound Etiology Non-Healing Surgical 03/28/23 0918   Dressing Status New dressing applied 03/28/23 0918   Wound Cleansed Soap and water 03/28/23 0831   Offloading for Diabetic Foot Ulcers Post op shoe 03/28/23 0918   Wound Length (cm) 1 cm 03/28/23 0831   Wound Width (cm) 0.2 cm 03/28/23 0831   Wound Depth (cm) 0.1 cm 03/28/23 0831   Wound Surface Area (cm^2) 0.2 cm^2 03/28/23 0831   Wound Volume (cm^3) 0.02 cm^3 03/28/23 0831   Distance Tunneling (cm) 0 cm 03/28/23 0831   Tunneling Position ___ O'Clock 0 03/28/23 0831   Undermining Starts ___ O'Clock 0 03/28/23 0831   Undermining Ends___ O'Clock 0 03/28/23 0831   Undermining Maxium Distance (cm) 0 03/28/23 0831   Wound Assessment [place]/red;Slough 03/28/23 0831   Drainage Amount Moderate 03/28/23 0831   Drainage Description Yellow;Serosanguinous 03/28/23 0831   Odor None 03/28/23 0831   Ruthie-wound Assessment Dry/flaky; Intact; Maceration 03/28/23 0831   Margins Defined edges 03/28/23 0831   Wound Thickness Description not for Pressure Injury Full thickness 03/28/23 0831   Number of days: 0       Percent of Wound(s) Debrided: approximately 100%    Total  Area  Debrided:  .2 sq cm     Bleeding:  None    Hemostasis Achieved:  not needed    Procedural Pain:  3  / 10     Post Procedural Pain:  0 / 10     Response to treatment:  Well tolerated by patient. Plan:       Discharge Instructions         PHYSICIAN ORDERS AND DISCHARGE INSTRUCTIONS    NOTE: Upon discharge from the [address], you will receive a patient experience survey. We would be grateful if you would take the time to fill this survey out.     Wound care order history:     CAIN's   Right       Left    Date:   Cultures:     Grafts:     Antibiotics:        Continuing wound care orders and information:                Residence:                Continue home health care with:    Your wound-care supplies will be

## 2023-04-04 ENCOUNTER — HOSPITAL ENCOUNTER (OUTPATIENT)
Dept: WOUND CARE | Age: 33
Discharge: HOME | End: 2023-04-04
Payer: COMMERCIAL

## 2023-04-04 VITALS — HEART RATE: 94 BPM | DIASTOLIC BLOOD PRESSURE: 69 MMHG | SYSTOLIC BLOOD PRESSURE: 135 MMHG | TEMPERATURE: 98.1 F

## 2023-04-04 DIAGNOSIS — L02.611 ABSCESS OF RIGHT FOOT: Primary | ICD-10-CM

## 2023-04-04 PROCEDURE — 99212 OFFICE O/P EST SF 10 MIN: CPT

## 2023-04-04 PROCEDURE — 99213 OFFICE O/P EST LOW 20 MIN: CPT | Performed by: SURGERY

## 2023-04-04 NOTE — PROGRESS NOTES
only takes once daily      TRINTELLIX 10 MG TABS tablet Take 10 mg by mouth daily      hydrOXYzine HCl (ATARAX) 25 MG tablet Take 1-2 tablets by mouth nightly as needed      atomoxetine (STRATTERA) 80 MG capsule Take 80 mg by mouth daily      acetaminophen (TYLENOL) 500 MG tablet Take 500 mg by mouth every 6 hours as needed for Pain       No current facility-administered medications on file prior to encounter. REVIEW OF SYSTEMS    Pertinent items are noted in HPI. Constitutional: Negative for systemic symptoms including fever, chills and malaise. Objective:      /69   Pulse 94   Temp 98.1 °F (36.7 °C) (Temporal)     PHYSICAL EXAM  General: No acute distress  Respiratory: Chest rise equal bilaterally  CV: Appears well perfused   Abdomen: Soft, nontender, nondistended, no rebound or guarding      General: The patient is in no acute distress. Mental status:  Patient is appropriate, is  oriented to place and plan of care. Dermatologic exam: Visual inspection of the periwound reveals the skin to be normal in turgor and texture  Wound exam: see wound description below in procedure note      Assessment:      Problem List Items Addressed This Visit          Other    Abscess of right foot - Primary     Healed. Discussed care to foot including washing area/moisturizer. Wound has healed and patient to follow up prn. Plan:       Discharge Instructions         PHYSICIAN ORDERS AND DISCHARGE INSTRUCTIONS     NOTE: Upon discharge from the [address], you will receive a patient experience survey. We would be grateful if you would take the time to fill this survey out.      Wound care order history:                 CAIN's   Right       Left               Date:              Cultures:                Grafts:                Antibiotics:           Continuing wound care orders and information:                 Residence:                Continue home health care with:               Your wound-care

## 2023-04-04 NOTE — DISCHARGE INSTRUCTIONS
PHYSICIAN ORDERS AND DISCHARGE INSTRUCTIONS     NOTE: Upon discharge from the [address], you will receive a patient experience survey. We would be grateful if you would take the time to fill this survey out. Wound care order history:                 CAIN's   Right       Left               Date:              Cultures:                Grafts:                Antibiotics:           Continuing wound care orders and information:                 Residence:                Continue home health care with:               Your wound-care supplies will be provided by: Wound cleansing:               Do not scrub or use excessive force. Wash hands with soap and water before and after dressing changes. Prior to applying a clean dressing, cleanse wound with normal saline, wound cleanser, or mild soap and water. Ask the physician or nurse before getting the wound(s) wet in a shower. General Wound management:              Keep weight off wounds and reposition every 2 hours. Avoid standing for long periods of time. Apply wraps/stockings in AM and remove at bedtime. If swelling is present, elevate legs to the level of the heart or above for 30 minutes 4-5 times a day and/or when sitting. When taking antibiotics take entire prescription as ordered by physician do not stop taking until medicine is all gone. Orders for this week: 04/4/2023     FAX ORDERS TO HOME CARE!     --need to find out which agency     Rx:     Right [address],        Discharged  Call  for any questions or concerns.   Date__________   Time____________

## 2023-06-22 ENCOUNTER — PROCEDURE VISIT (OUTPATIENT)
Dept: CARDIOLOGY CLINIC | Age: 33
End: 2023-06-22
Payer: COMMERCIAL

## 2023-06-22 DIAGNOSIS — R06.02 SOB (SHORTNESS OF BREATH): Primary | ICD-10-CM

## 2023-06-22 DIAGNOSIS — R07.9 CHEST PAIN, UNSPECIFIED TYPE: ICD-10-CM

## 2023-06-22 PROCEDURE — 93015 CV STRESS TEST SUPVJ I&R: CPT | Performed by: INTERNAL MEDICINE

## 2023-06-30 ENCOUNTER — HOSPITAL ENCOUNTER (OUTPATIENT)
Dept: PULMONOLOGY | Age: 33
Discharge: HOME | End: 2023-06-30
Payer: COMMERCIAL

## 2023-06-30 LAB
DLCO %PRED: 94 %
DLCO PRED: NORMAL
DLCO/VA %PRED: NORMAL
DLCO/VA PRED: NORMAL
DLCO/VA: NORMAL
DLCO: NORMAL
EXPIRATORY TIME-POST: NORMAL
EXPIRATORY TIME: NORMAL
FEF 25-75% %CHNG: NORMAL
FEF 25-75% %PRED-POST: NORMAL
FEF 25-75% %PRED-PRE: NORMAL
FEF 25-75% PRED: NORMAL
FEF 25-75%-POST: NORMAL
FEF 25-75%-PRE: NORMAL
FEV1 %PRED-POST: 98 %
FEV1 %PRED-PRE: 89 %
FEV1 PRED: NORMAL
FEV1-POST: NORMAL
FEV1-PRE: NORMAL
FEV1/FVC %PRED-POST: 101 %
FEV1/FVC %PRED-PRE: 91 %
FEV1/FVC PRED: NORMAL
FEV1/FVC-POST: NORMAL
FEV1/FVC-PRE: NORMAL
FVC %PRED-POST: 96 L
FVC %PRED-PRE: 97 %
FVC PRED: NORMAL
FVC-POST: NORMAL
FVC-PRE: NORMAL
GAW %PRED: NORMAL
GAW PRED: NORMAL
GAW: NORMAL
IC %PRED: NORMAL
IC PRED: NORMAL
IC: NORMAL
MEP: NORMAL
MIP: NORMAL
MVV %PRED-PRE: NORMAL
MVV PRED: NORMAL
MVV-PRE: NORMAL
PEF %PRED-POST: NORMAL
PEF %PRED-PRE: NORMAL
PEF PRED: NORMAL
PEF%CHNG: NORMAL
PEF-POST: NORMAL
PEF-PRE: NORMAL
RAW %PRED: NORMAL
RAW PRED: NORMAL
RAW: NORMAL
RV %PRED: NORMAL
RV PRED: NORMAL
RV: NORMAL
SVC %PRED: NORMAL
SVC PRED: NORMAL
SVC: NORMAL
TLC %PRED: 98 %
TLC PRED: NORMAL
TLC: NORMAL
VA %PRED: NORMAL
VA PRED: NORMAL
VA: NORMAL
VTG %PRED: NORMAL
VTG PRED: NORMAL
VTG: NORMAL

## 2023-06-30 PROCEDURE — 94726 PLETHYSMOGRAPHY LUNG VOLUMES: CPT

## 2023-06-30 PROCEDURE — 94060 EVALUATION OF WHEEZING: CPT

## 2023-06-30 PROCEDURE — 94729 DIFFUSING CAPACITY: CPT

## 2023-06-30 ASSESSMENT — PULMONARY FUNCTION TESTS
FEV1/FVC_PERCENT_PREDICTED_PRE: 91
FEV1/FVC_PERCENT_PREDICTED_POST: 101
FEV1_PERCENT_PREDICTED_PRE: 89
FVC_PERCENT_PREDICTED_PRE: 97
FEV1_PERCENT_PREDICTED_POST: 98
FVC_PERCENT_PREDICTED_POST: 96

## 2023-07-05 ENCOUNTER — PROCEDURE VISIT (OUTPATIENT)
Dept: CARDIOLOGY CLINIC | Age: 33
End: 2023-07-05
Payer: COMMERCIAL

## 2023-07-05 DIAGNOSIS — R07.9 CHEST PAIN, UNSPECIFIED TYPE: ICD-10-CM

## 2023-07-05 DIAGNOSIS — J45.909 ASTHMA, UNSPECIFIED ASTHMA SEVERITY, UNSPECIFIED WHETHER COMPLICATED, UNSPECIFIED WHETHER PERSISTENT: ICD-10-CM

## 2023-07-05 LAB
LV EF: 58 %
LVEF MODALITY: NORMAL

## 2023-07-05 PROCEDURE — 93306 TTE W/DOPPLER COMPLETE: CPT | Performed by: INTERNAL MEDICINE

## 2023-07-10 ENCOUNTER — TELEPHONE (OUTPATIENT)
Dept: CARDIOLOGY CLINIC | Age: 33
End: 2023-07-10

## 2023-07-10 ENCOUNTER — OFFICE VISIT (OUTPATIENT)
Dept: CARDIOLOGY CLINIC | Age: 33
End: 2023-07-10
Payer: COMMERCIAL

## 2023-07-10 VITALS
DIASTOLIC BLOOD PRESSURE: 78 MMHG | OXYGEN SATURATION: 97 % | HEIGHT: 65 IN | SYSTOLIC BLOOD PRESSURE: 124 MMHG | BODY MASS INDEX: 35.92 KG/M2 | WEIGHT: 215.6 LBS | HEART RATE: 71 BPM

## 2023-07-10 DIAGNOSIS — Z01.810 PRE-OPERATIVE CARDIOVASCULAR EXAMINATION: Primary | ICD-10-CM

## 2023-07-10 DIAGNOSIS — F41.9 ANXIETY: ICD-10-CM

## 2023-07-10 DIAGNOSIS — R06.02 SOB (SHORTNESS OF BREATH): ICD-10-CM

## 2023-07-10 DIAGNOSIS — R07.9 CHEST PAIN, UNSPECIFIED TYPE: Primary | ICD-10-CM

## 2023-07-10 DIAGNOSIS — J45.909 ASTHMA, UNSPECIFIED ASTHMA SEVERITY, UNSPECIFIED WHETHER COMPLICATED, UNSPECIFIED WHETHER PERSISTENT: ICD-10-CM

## 2023-07-10 PROCEDURE — 99214 OFFICE O/P EST MOD 30 MIN: CPT | Performed by: INTERNAL MEDICINE

## 2023-07-10 PROCEDURE — G8427 DOCREV CUR MEDS BY ELIG CLIN: HCPCS | Performed by: INTERNAL MEDICINE

## 2023-07-10 PROCEDURE — G8417 CALC BMI ABV UP PARAM F/U: HCPCS | Performed by: INTERNAL MEDICINE

## 2023-07-10 PROCEDURE — 4004F PT TOBACCO SCREEN RCVD TLK: CPT | Performed by: INTERNAL MEDICINE

## 2023-07-10 NOTE — PATIENT INSTRUCTIONS
Please be informed that if you contact our office outside of normal business hours the physician on call cannot help with any scheduling or rescheduling issues, procedure instruction questions or any type of medication issue. We advise you for any urgent/emergency that you go to the nearest emergency room! PLEASE CALL OUR OFFICE DURING NORMAL BUSINESS HOURS    Monday - Friday   8 am to 5 pm    [place]: [address]: [phone]    [place]:  [phone]    **It is YOUR responsibilty to bring medication bottles and/or updated medication list to [address] [place] [address]. This will allow us to better serve you and all your healthcare needs**    [facility] Locations - No appointment necessary. Sites open Monday to Friday. Call your preferred location for test preparation, business   hours and other information you need. [employer] [facility] [employer]. [address] [place] [address]. [address], [address] [facility]  Phone: [phone]     We are committed to providing you the best care possible. If you receive a survey after visiting one of our offices, please take time to share your experience concerning your physician office visit. These surveys are confidential and no health information about you is shared. We are eager to improve for you and we are counting on your feedback to help make that happen.

## 2023-07-10 NOTE — PROGRESS NOTES
Federico Galo MD        OFFICE  FOLLOWUP NOTE    Chief complaints: patient is here for management of chest pain, shortness of breath, anxiety    Subjective: sometimes chest pain, no shortness of breath, no dizziness, no palpitations    ARABELLA Wilburn is a 35 y. o.year old who  has a past medical history of Asthma and Hypertension. and presents for management of chest pain, shortness of breath, anxiety which are well controlled      Current Outpatient Medications   Medication Sig Dispense Refill    amLODIPine (NORVASC) 10 MG tablet Take 1 tablet by mouth daily      cloNIDine (CATAPRES) 0.2 MG tablet Take 1 tablet by mouth daily 03/07/23 prescription is for BID patient states she usually only takes once daily      hydrOXYzine HCl (ATARAX) 25 MG tablet Take 1-2 tablets by mouth nightly as needed      atomoxetine (STRATTERA) 80 MG capsule Take 1 capsule by mouth daily      sennosides-docusate sodium (SENOKOT-S) 8.6-50 MG tablet Take 2 tablets by mouth daily (Patient not taking: Reported on 6/15/2023) 30 tablet 0    ondansetron (ZOFRAN) 4 MG tablet Take 1 tablet by mouth daily as needed for Nausea or Vomiting (Patient not taking: Reported on 6/15/2023) 10 tablet 0    TRINTELLIX 10 MG TABS tablet Take 10 mg by mouth daily (Patient not taking: Reported on 6/15/2023)      acetaminophen (TYLENOL) 500 MG tablet Take 500 mg by mouth every 6 hours as needed for Pain (Patient not taking: Reported on 6/15/2023)       No current facility-administered medications for this visit.      Allergies: Adhesive tape  Past Medical History:   Diagnosis Date    Asthma     Hypertension      Past Surgical History:   Procedure Laterality Date    FOOT DEBRIDEMENT Right 3/8/2023    FOOT INCISION AND DRAINAGE performed by Kia Estevez DO at [facility] OR     Family History   Problem Relation Age of Onset    Heart Attack Mother     Heart Attack Father      Social History     Tobacco Use    Smoking status: Every Day     Packs/day: 0.25

## 2023-07-11 ENCOUNTER — TELEPHONE (OUTPATIENT)
Dept: CARDIOLOGY CLINIC | Age: 33
End: 2023-07-11

## 2023-07-11 NOTE — TELEPHONE ENCOUNTER
Pt notified and confirmed procedure for 8/3/23 @7am, arrival time 545am. Instruction call scheduled for 7/31/23 @830am.

## 2023-07-11 NOTE — TELEPHONE ENCOUNTER
[address] [facility] WITH POSSIBLE PERCUTANEOUS CORONARY INTERVENTION        Patient Name: Jewels Lange  SZL:[id number]  [facility]#[id number]    Date of Procedure: 8/3/23 Time: 7am Arrival Time: 164PT    The catheterization and angiogram are usually outpatient procedures, however if stenting is needed you may need to stay overnight. You will need to arrive at the hospital two hours before the procedure. You will go to registration in the main lobby. You will need to arrange for someone to drive you home. HOSPITAL:  [facility])      X   If you have received orders for blood work and or a chest x-ray, please have them done on assigned date at [address] [place] [address] or [facility].     X Please do not have anything by mouth after midnight prior to or 8 hours before the procedure.     X You may take your medications with a sip of water in the morning of your procedure or take them with you to the hospital

## 2023-07-20 ENCOUNTER — TELEPHONE (OUTPATIENT)
Dept: CARDIOLOGY CLINIC | Age: 33
End: 2023-07-20

## 2023-07-20 NOTE — TELEPHONE ENCOUNTER
7/5/2023 ECHO     Summary   Left ventricular function and size is normal, EF is estimated at 55-60%. Mild left ventricular hypertrophy. Grade I diastolic dysfunction. No regional wall motion abnormalities were detected. No significant valvular abnormalities. No evidence of pericardial effusion.        LM ON VM OF RESULTS BEING \"WNL\"

## 2023-07-31 ENCOUNTER — HOSPITAL ENCOUNTER (OUTPATIENT)
Dept: GENERAL RADIOLOGY | Age: 33
Discharge: HOME | End: 2023-07-31
Payer: COMMERCIAL

## 2023-07-31 ENCOUNTER — HOSPITAL ENCOUNTER (OUTPATIENT)
Age: 33
Discharge: HOME | End: 2023-07-31
Payer: COMMERCIAL

## 2023-07-31 ENCOUNTER — TELEPHONE (OUTPATIENT)
Dept: CARDIOLOGY CLINIC | Age: 33
End: 2023-07-31

## 2023-07-31 DIAGNOSIS — Z01.810 PRE-OPERATIVE CARDIOVASCULAR EXAMINATION: ICD-10-CM

## 2023-07-31 LAB
ABO/RH: NORMAL
ANION GAP SERPL CALCULATED.3IONS-SCNC: 11 MMOL/L (ref 4–16)
ANTIBODY SCREEN: NEGATIVE
BUN SERPL-MCNC: 20 MG/DL (ref 6–23)
CALCIUM SERPL-MCNC: 9.3 MG/DL (ref 8.3–10.6)
CHLORIDE BLD-SCNC: 106 MMOL/L (ref 99–110)
CO2: 23 MMOL/L (ref 21–32)
COMMENT: NORMAL
CREAT SERPL-MCNC: 0.9 MG/DL (ref 0.6–1.1)
GFR SERPL CREATININE-BSD FRML MDRD: >60 ML/MIN/1.73M2
GLUCOSE SERPL-MCNC: 83 MG/DL (ref 70–99)
HCT VFR BLD CALC: 38.5 % (ref 37–47)
HEMOGLOBIN: 12.6 GM/DL (ref 12.5–16)
MCH RBC QN AUTO: 29.6 PG (ref 27–31)
MCHC RBC AUTO-ENTMCNC: 32.7 % (ref 32–36)
MCV RBC AUTO: 90.6 FL (ref 78–100)
PDW BLD-RTO: 12.3 % (ref 11.7–14.9)
PLATELET # BLD: 210 K/CU MM (ref 140–440)
PMV BLD AUTO: 10.9 FL (ref 7.5–11.1)
POTASSIUM SERPL-SCNC: 4.5 MMOL/L (ref 3.5–5.1)
RBC # BLD: 4.25 M/CU MM (ref 4.2–5.4)
SODIUM BLD-SCNC: 140 MMOL/L (ref 135–145)
WBC # BLD: 8.6 K/CU MM (ref 4–10.5)

## 2023-07-31 PROCEDURE — 36415 COLL VENOUS BLD VENIPUNCTURE: CPT

## 2023-07-31 PROCEDURE — 85027 COMPLETE CBC AUTOMATED: CPT

## 2023-07-31 PROCEDURE — 86850 RBC ANTIBODY SCREEN: CPT

## 2023-07-31 PROCEDURE — 86901 BLOOD TYPING SEROLOGIC RH(D): CPT

## 2023-07-31 PROCEDURE — 71046 X-RAY EXAM CHEST 2 VIEWS: CPT

## 2023-07-31 PROCEDURE — 86900 BLOOD TYPING SEROLOGIC ABO: CPT

## 2023-07-31 PROCEDURE — 80048 BASIC METABOLIC PNL TOTAL CA: CPT

## 2023-07-31 NOTE — TELEPHONE ENCOUNTER
Patient given instructions over telephone on [facility] for Dx: Lenore Suarez. Procedure is scheduled for 8/3/23 @ 7am, w/arrival @ 545am, @ [facility]. Pre-admission orders are in [facility] for labwork and/or CXR, which are due 7/31/23 @ [facility]. Patient advised to review instructions given. Patient was notified that procedure date or time could be changed due to an emergency. Patient voiced understanding.

## 2023-08-03 ENCOUNTER — HOSPITAL ENCOUNTER (OUTPATIENT)
Dept: CARDIAC CATH/INVASIVE PROCEDURES | Age: 33
Discharge: HOME | End: 2023-08-03
Attending: INTERNAL MEDICINE | Admitting: INTERNAL MEDICINE
Payer: COMMERCIAL

## 2023-08-03 VITALS
OXYGEN SATURATION: 100 % | RESPIRATION RATE: 18 BRPM | BODY MASS INDEX: 36.32 KG/M2 | HEIGHT: 65 IN | WEIGHT: 218 LBS | SYSTOLIC BLOOD PRESSURE: 170 MMHG | DIASTOLIC BLOOD PRESSURE: 100 MMHG | HEART RATE: 67 BPM

## 2023-08-03 PROBLEM — R94.39 ABNORMAL STRESS TEST: Status: ACTIVE | Noted: 2023-08-03

## 2023-08-03 PROCEDURE — 2709999900 HC NON-CHARGEABLE SUPPLY

## 2023-08-03 PROCEDURE — 6360000002 HC RX W HCPCS

## 2023-08-03 PROCEDURE — 93454 CORONARY ARTERY ANGIO S&I: CPT

## 2023-08-03 PROCEDURE — C1769 GUIDE WIRE: HCPCS

## 2023-08-03 PROCEDURE — 2580000003 HC RX 258: Performed by: INTERNAL MEDICINE

## 2023-08-03 PROCEDURE — 6370000000 HC RX 637 (ALT 250 FOR IP): Performed by: INTERNAL MEDICINE

## 2023-08-03 PROCEDURE — 93458 L HRT ARTERY/VENTRICLE ANGIO: CPT | Performed by: INTERNAL MEDICINE

## 2023-08-03 PROCEDURE — C1894 INTRO/SHEATH, NON-LASER: HCPCS

## 2023-08-03 PROCEDURE — 6360000004 HC RX CONTRAST MEDICATION

## 2023-08-03 PROCEDURE — 2500000003 HC RX 250 WO HCPCS

## 2023-08-03 RX ORDER — DIAZEPAM 5 MG/1
5 TABLET ORAL ONCE
Status: COMPLETED | OUTPATIENT
Start: 2023-08-03 | End: 2023-08-03

## 2023-08-03 RX ORDER — DIPHENHYDRAMINE HCL 25 MG
25 TABLET ORAL ONCE
Status: COMPLETED | OUTPATIENT
Start: 2023-08-03 | End: 2023-08-03

## 2023-08-03 RX ORDER — SODIUM CHLORIDE 9 MG/ML
INJECTION, SOLUTION INTRAVENOUS CONTINUOUS
Status: DISCONTINUED | OUTPATIENT
Start: 2023-08-03 | End: 2023-08-03 | Stop reason: HOSPADM

## 2023-08-03 RX ADMIN — SODIUM CHLORIDE: 9 INJECTION, SOLUTION INTRAVENOUS at 06:12

## 2023-08-03 RX ADMIN — DIPHENHYDRAMINE HYDROCHLORIDE 25 MG: 25 TABLET ORAL at 06:13

## 2023-08-03 RX ADMIN — DIAZEPAM 5 MG: 5 TABLET ORAL at 06:13

## 2023-08-03 NOTE — FLOWSHEET NOTE
Pt to pt  in wheelchair per volunteer for d/c home with transport co. Right radial site free of bleeding/hematoma at time of d/c

## 2023-08-03 NOTE — PLAN OF CARE
Patient stating her IV was really hurting her and asked for it to be removed. Patient was already walked to the bathroom without difficulty and vitals stable and to be discharged at 0935. Patient calling [facility] for her ride home. TR band removed as well and tegaderm applied with sterile dressing placed.  [address] 8/3/2023

## 2023-08-11 ENCOUNTER — OFFICE VISIT (OUTPATIENT)
Dept: CARDIOLOGY CLINIC | Age: 33
End: 2023-08-11
Payer: COMMERCIAL

## 2023-08-11 VITALS
WEIGHT: 220.6 LBS | HEIGHT: 65 IN | SYSTOLIC BLOOD PRESSURE: 138 MMHG | BODY MASS INDEX: 36.75 KG/M2 | DIASTOLIC BLOOD PRESSURE: 80 MMHG

## 2023-08-11 DIAGNOSIS — F41.9 ANXIETY: ICD-10-CM

## 2023-08-11 DIAGNOSIS — R06.02 SOB (SHORTNESS OF BREATH): ICD-10-CM

## 2023-08-11 DIAGNOSIS — R07.9 CHEST PAIN, UNSPECIFIED TYPE: Primary | ICD-10-CM

## 2023-08-11 DIAGNOSIS — J45.909 ASTHMA, UNSPECIFIED ASTHMA SEVERITY, UNSPECIFIED WHETHER COMPLICATED, UNSPECIFIED WHETHER PERSISTENT: ICD-10-CM

## 2023-08-11 PROCEDURE — G8417 CALC BMI ABV UP PARAM F/U: HCPCS | Performed by: INTERNAL MEDICINE

## 2023-08-11 PROCEDURE — G8427 DOCREV CUR MEDS BY ELIG CLIN: HCPCS | Performed by: INTERNAL MEDICINE

## 2023-08-11 PROCEDURE — 99214 OFFICE O/P EST MOD 30 MIN: CPT | Performed by: INTERNAL MEDICINE

## 2023-08-11 PROCEDURE — 4004F PT TOBACCO SCREEN RCVD TLK: CPT | Performed by: INTERNAL MEDICINE

## 2023-08-11 NOTE — PATIENT INSTRUCTIONS
[facility] Laboratory Locations - No appointment necessary. Sites open Monday to Friday. Call your preferred location for test preparation, business   hours and other information you need. [employer] [facility] [employer]. [facility]. [address] [address] [address], [address]  Phone: [phone]     **It is YOUR responsibilty to bring medication bottles and/or updated medication list to [facility]. This will allow us to better serve you and all your healthcare needs**    Please be informed that if you contact our office outside of normal business hours the physician on call cannot help with any scheduling or rescheduling issues, procedure instruction questions or any type of medication issue. We advise you for any urgent/emergency that you go to the nearest emergency room! PLEASE CALL OUR OFFICE DURING NORMAL BUSINESS HOURS    Monday - Friday   8 am to 5 pm    [place]: [address] [place]: [phone]    [place]:  [phone]    Thank you for allowing us to care for you today! We want to ensure we can follow your treatment plan and we strive to give you the best outcomes and experience possible. If you ever have a life threatening emergency and call 911 - for an ambulance (EMS)   Our providers can only care for you at:   [facility] or [facility]. Even if you have someone take you or you drive yourself we can only care for you in a [address] [place] [address] facility. Our providers are not setup at the other healthcare locations! We are committed to providing you the best care possible. If you receive a survey after visiting one of our offices, please take time to share your experience concerning your physician office visit. These surveys are confidential and no health information about you is shared. We are eager to improve for you and we are counting on your feedback to help make that happen.

## 2023-08-11 NOTE — PROGRESS NOTES
Normal rhythm, No murmurs, No rubs, No gallops. Carotid arteries pulse and amplitude are normal no bruit, no abdominal bruit noted ( normal abdominal aorta ausculation),   Extremities - No cyanosis, clubbing, or significant edema, no varicose veins    Abdomen - No masses, tenderness, or organomegaly, no hepato-splenomegally, no bruits  Musculoskeletal rt radial site bruise, mild edema, no kyphosis or scoliosis, no deformity in any extremity noted, muscle strength and tone are normal  Skin: no ulcer,no scar,no stasis dermatitis   Neurologic - alert oriented times 3,Cranial nerves II through XII are grossly intact. There were no gross focal neurologic abnormalities. Psychiatric: normal mood and affect    No results found for: CKTOTAL, CKMB, CKMBINDEX, TROPONINI  BNP:  No results found for: BNP  PT/INR:  No results found for: PTINR  No results found for: LABA1C  No results found for: CHOL, TRIG, HDL, LDLCALC, LDLDIRECT  Lab Results   Component Value Date    ALT 64 (H) 03/14/2023    AST 50 (H) 03/14/2023     TSH:  No results found for: TSH    Impression:  Dahlia Barnhart is a 35 y. o.year old who  has a past medical history of Asthma and Hypertension. and presents with     Plan:  Chest pain: stress test is abnormal, has some chest pain symptoms, LHC is normal,  echo showed normal LVEF. Rt radial swelling: apply ice packs and use NSAIDs  X drug users, recommend to stop smoking  Shortness of breath, echo, stress test ordered, PFT prelim results are normal  Anxiety: recomment treatment  Health maintenance: exerise and diet  All labs, medications and tests reviewed, continue all other medications of all above medical condition listed as is.     [unfilled]

## 2023-11-02 ENCOUNTER — APPOINTMENT (OUTPATIENT)
Dept: CT IMAGING | Age: 33
End: 2023-11-02
Payer: COMMERCIAL

## 2023-11-02 ENCOUNTER — APPOINTMENT (OUTPATIENT)
Dept: GENERAL RADIOLOGY | Age: 33
End: 2023-11-02
Payer: COMMERCIAL

## 2023-11-02 ENCOUNTER — HOSPITAL ENCOUNTER (EMERGENCY)
Age: 33
Discharge: HOME | End: 2023-11-02
Payer: COMMERCIAL

## 2023-11-02 VITALS
TEMPERATURE: 98.4 F | OXYGEN SATURATION: 92 % | HEART RATE: 92 BPM | SYSTOLIC BLOOD PRESSURE: 146 MMHG | HEIGHT: 65 IN | DIASTOLIC BLOOD PRESSURE: 102 MMHG | BODY MASS INDEX: 36.65 KG/M2 | WEIGHT: 220 LBS | RESPIRATION RATE: 16 BRPM

## 2023-11-02 DIAGNOSIS — M25.571 ACUTE RIGHT ANKLE PAIN: ICD-10-CM

## 2023-11-02 DIAGNOSIS — S09.90XA INJURY OF HEAD, INITIAL ENCOUNTER: Primary | ICD-10-CM

## 2023-11-02 DIAGNOSIS — M25.561 ACUTE PAIN OF RIGHT KNEE: ICD-10-CM

## 2023-11-02 DIAGNOSIS — M25.551 PAIN OF RIGHT HIP: ICD-10-CM
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
## 2023-11-02 DIAGNOSIS — R91.1 LUNG NODULE: ICD-10-CM

## 2023-11-02 DIAGNOSIS — M54.6 PAIN IN THORACIC SPINE: ICD-10-CM

## 2023-11-02 DIAGNOSIS — M79.671 RIGHT FOOT PAIN: ICD-10-CM

## 2023-11-02 DIAGNOSIS — S16.1XXA ACUTE STRAIN OF NECK MUSCLE, INITIAL ENCOUNTER: ICD-10-CM

## 2023-11-02 DIAGNOSIS — M54.50 ACUTE MIDLINE LOW BACK PAIN WITHOUT SCIATICA: ICD-10-CM

## 2023-11-02 DIAGNOSIS — M25.511 ACUTE PAIN OF RIGHT SHOULDER: ICD-10-CM

## 2023-11-02 DIAGNOSIS — R03.0 ELEVATED BLOOD PRESSURE READING: ICD-10-CM

## 2023-11-02 DIAGNOSIS — R19.00 PELVIC MASS: ICD-10-CM

## 2023-11-02 LAB
HCG QUALITATIVE: NEGATIVE
PREGNANCY TEST URINE, POC: NEGATIVE

## 2023-11-02 PROCEDURE — 73610 X-RAY EXAM OF ANKLE: CPT

## 2023-11-02 PROCEDURE — 72128 CT CHEST SPINE W/O DYE: CPT

## 2023-11-02 PROCEDURE — 73502 X-RAY EXAM HIP UNI 2-3 VIEWS: CPT

## 2023-11-02 PROCEDURE — 72125 CT NECK SPINE W/O DYE: CPT

## 2023-11-02 PROCEDURE — 6370000000 HC RX 637 (ALT 250 FOR IP): Performed by: NURSE PRACTITIONER

## 2023-11-02 PROCEDURE — 99284 EMERGENCY DEPT VISIT MOD MDM: CPT

## 2023-11-02 PROCEDURE — 70450 CT HEAD/BRAIN W/O DYE: CPT

## 2023-11-02 PROCEDURE — 73630 X-RAY EXAM OF FOOT: CPT

## 2023-11-02 PROCEDURE — 73564 X-RAY EXAM KNEE 4 OR MORE: CPT

## 2023-11-02 PROCEDURE — 73590 X-RAY EXAM OF LOWER LEG: CPT

## 2023-11-02 PROCEDURE — 72131 CT LUMBAR SPINE W/O DYE: CPT

## 2023-11-02 PROCEDURE — 84703 CHORIONIC GONADOTROPIN ASSAY: CPT

## 2023-11-02 PROCEDURE — 73030 X-RAY EXAM OF SHOULDER: CPT

## 2023-11-02 PROCEDURE — 81025 URINE PREGNANCY TEST: CPT

## 2023-11-02 RX ORDER — METHOCARBAMOL 750 MG/1
750 TABLET, FILM COATED ORAL 4 TIMES DAILY PRN
Qty: 40 TABLET | Refills: 0 | Status: SHIPPED | OUTPATIENT
Start: 2023-11-02 | End: 2023-11-12

## 2023-11-02 RX ORDER — HYDROCODONE BITARTRATE AND ACETAMINOPHEN 5; 325 MG/1; MG/1
1 TABLET ORAL ONCE
Status: COMPLETED | OUTPATIENT
Start: 2023-11-02 | End: 2023-11-02

## 2023-11-02 RX ORDER — IBUPROFEN 600 MG/1
600 TABLET ORAL ONCE
Status: DISCONTINUED | OUTPATIENT
Start: 2023-11-02 | End: 2023-11-02 | Stop reason: HOSPADM

## 2023-11-02 RX ORDER — BENZONATATE 100 MG/1
100 CAPSULE ORAL 3 TIMES DAILY PRN
Qty: 30 CAPSULE | Refills: 0 | Status: SHIPPED | OUTPATIENT
Start: 2023-11-02 | End: 2023-11-12

## 2023-11-02 RX ADMIN — HYDROCODONE BITARTRATE AND ACETAMINOPHEN 1 TABLET: 5; 325 TABLET ORAL at 09:10

## 2023-11-02 ASSESSMENT — PAIN DESCRIPTION - LOCATION
LOCATION: HEAD;SHOULDER;NECK
LOCATION: NECK;SHOULDER

## 2023-11-02 ASSESSMENT — PAIN SCALES - GENERAL
PAINLEVEL_OUTOF10: 10
PAINLEVEL_OUTOF10: 10

## 2023-11-02 ASSESSMENT — PAIN DESCRIPTION - ORIENTATION
ORIENTATION: RIGHT
ORIENTATION: RIGHT

## 2023-11-02 ASSESSMENT — PAIN DESCRIPTION - DESCRIPTORS
DESCRIPTORS: ACHING
DESCRIPTORS: ACHING

## 2023-11-02 NOTE — ED NOTES
Pt to ED stating she tripped and fell yesterday down 5 stairs. Pt states she hit the right side of her head and shoulder. States she did have LOC after the fall. C/o pain in neck, right shoulder, head and right leg. Pt also would like to see if she can get a refill of albuterol inhaler. States she has had increase in coughing and would like inhaler.       Alejandra Watkins RN  11/02/23 1975

## 2023-11-02 NOTE — ED PROVIDER NOTES
complains of nausea and vomiting. She states she was evaluated weighted yesterday and told to come here, however the wait was too long. She is having continued symptoms. She rates her pain 10/10, aching and sharp, and constant. Range of motion palpation exacerbate her symptoms, nothing has alleviated her symptoms. She has not taken any medications today. She denies chest pain, rib pain, shortness of breath, upper extremity pain, or other complaints. History from : Patient    Limitations to history : None    Patient was given the following medications:  Medications   HYDROcodone-acetaminophen (NORCO) 5-325 MG per tablet 1 tablet (1 tablet Oral Given 11/2/23 0910)       Chronic conditions affecting care: Asthma, hypertension    Discussion with Other Profesionals : None    Social Determinants : None    Records Reviewed : None    Disposition Considerations (tests considered but not done, Shared Decision Making, Pt Expectation of Test or Tx.):   X-ray of the right foot, ankle, knee, and pelvic lives with the right hip obtained. CT of the head without contrast, cervical spine without contrast, and thoracic and lumbar spine obtained. She was medicated with Norco p.o.    CT of the head without contrast showed no acute findings. CT of the cervical, thoracic spine, and lumbar spine showed a partially visualized mass in the pelvis measuring at least 5.4 x 4.6 suggesting an ovarian dermoid. Radiologist recommends further evaluation with a pelvic ultrasound. There was also a 5 mm nodule opacity in the right lower lobe recommending 1 year follow-up chest CT. Right shoulder x-ray showed no acute findings. X-ray of the right foot, knee, and hip showed no acute findings, had some incidental chronic findings, see report. Patient states her ankle had most of the pain as she twisted it at work a couple days previously. Her right ankle was placed in an Ace bandage and air stirrup.   She was given crutches to

## 2023-11-02 NOTE — DISCHARGE INSTRUCTIONS
You have a pelvic mass that will require an ultrasound for further evaluation. Follow-up with your primary care provider or gynecology to have this ordered. Call this week to schedule an appointment. Have your primary care provider recheck your blood pressure, it was elevated during today's stay. Also discussed the lung nodule with your primary care provider, they will repeat imaging in 6 months to 1 year. Wear Ace bandage and ankle stirrup for comfort and support. Use crutches to weight-bear as tolerated. Follow-up with your primary care provider within a week and have them recheck, if having continued symptoms you may need referral to orthopedics. Elevate extremity, ice area several times daily for at least 20 minutes, make sure there is a cloth between ice and skin. Take Tylenol and ibuprofen as directed for pain if no contraindications. Tessalon Perles as directed for cough. Robaxin as directed for muscle spasm. Return with worsening symptoms. Do not drive a car, operate machinery, drink alcohol, sign legal documents, or do any activities that would be dangerous while under the influence of a sedative/narcoctic medication.

## 2023-11-02 NOTE — ED NOTES
Discussed patient's prescriptions with patient. Discussed follow up with PCP. No further questions at this time. Ambulatory at discharge.    Discharged by Marcial Decker, [address]  11/02/23 2568 Ambulatory

## 2023-11-08 ENCOUNTER — OFFICE VISIT (OUTPATIENT)
Age: 33
End: 2023-11-08
Payer: COMMERCIAL

## 2023-11-08 VITALS
BODY MASS INDEX: 36.49 KG/M2 | SYSTOLIC BLOOD PRESSURE: 123 MMHG | RESPIRATION RATE: 18 BRPM | HEIGHT: 65 IN | DIASTOLIC BLOOD PRESSURE: 81 MMHG | WEIGHT: 219 LBS

## 2023-11-08 DIAGNOSIS — R19.00 PELVIC MASS: Primary | ICD-10-CM

## 2023-11-08 PROCEDURE — 99202 OFFICE O/P NEW SF 15 MIN: CPT | Performed by: STUDENT IN AN ORGANIZED HEALTH CARE EDUCATION/TRAINING PROGRAM

## 2023-11-08 SDOH — ECONOMIC STABILITY: FOOD INSECURITY: WITHIN THE PAST 12 MONTHS, THE FOOD YOU BOUGHT JUST DIDN'T LAST AND YOU DIDN'T HAVE MONEY TO GET MORE.: SOMETIMES TRUE

## 2023-11-08 SDOH — ECONOMIC STABILITY: INCOME INSECURITY: HOW HARD IS IT FOR YOU TO PAY FOR THE VERY BASICS LIKE FOOD, HOUSING, MEDICAL CARE, AND HEATING?: VERY HARD

## 2023-11-08 SDOH — ECONOMIC STABILITY: HOUSING INSECURITY
IN THE LAST 12 MONTHS, WAS THERE A TIME WHEN YOU DID NOT HAVE A STEADY PLACE TO SLEEP OR SLEPT IN A SHELTER (INCLUDING NOW)?: YES

## 2023-11-08 SDOH — ECONOMIC STABILITY: FOOD INSECURITY: WITHIN THE PAST 12 MONTHS, YOU WORRIED THAT YOUR FOOD WOULD RUN OUT BEFORE YOU GOT MONEY TO BUY MORE.: SOMETIMES TRUE

## 2023-11-08 NOTE — PROGRESS NOTES
Department of Obstetrics and Gynecology  Office Visit  Name:  Lloyd Presley   CSN: [id number]    : 1990   Age: 35 y.o. Chief Complaint   Patient presents with    New Patient     Pt here for new GYN. Last Pap -neg, LMP 23-reg, sexually active, no birth control, has had hysterectomy- still has L ovary,     Follow-up     Here for ER f/u. Seen in ER on 23- fatty mass found in pelvis- suggested of ovarian dermoid. C/o pain in that area. Describes pain as sharp and stabbing. Pain worse when lifting things. Rates pain /10       SUBJECTIVE:  Lloyd Presley is a 35 y.o. No obstetric history on file. who presents for ER follow up. Patient was seen on  for a fall and CT at that time incidentally showed fatty mass in the pelvic measuring 5.4 x 4.6 cm suggesting ovarian dermoid. Patient reports she has history of ovarian cyst. Patient has had left ovary and fallopian tube removed for ovarian cyst. She notes that she still has her uterus, right fallopian tube and right ovary. Patient has been on OCPs in the past.    GYN HX:    Menses:   Patient's last menstrual period was 2023 (exact date). Contraception: None. Denies any problems with that method, wishes to continue. Infections:  Prior history many years ago of chlamydia that was treated. Sexual history:  Currently is not sexually active for past 3 months. Patient's medications, allergies, past medical, surgical, social and family histories were reviewed and updated as appropriate.     Past Medical History:   Diagnosis Date    Anxiety     Asthma     Depression     Hypertension     Insomnia     PTSD (post-traumatic stress disorder)        Past Surgical History:   Procedure Laterality Date    FINGER SURGERY      FOOT DEBRIDEMENT Right 2023    FOOT INCISION AND DRAINAGE performed by Nayely Augustin DO at [address] [place] [address] (CERVIX NOT REMOVED)      L ovary removed and fallopian tubes removed       Social History

## 2023-11-22 PROBLEM — R94.39 ABNORMAL STRESS TEST: Status: RESOLVED | Noted: 2023-08-03 | Resolved: 2023-11-22

## 2023-11-28 ENCOUNTER — OFFICE VISIT (OUTPATIENT)
Dept: OBGYN | Age: 33
End: 2023-11-28
Payer: COMMERCIAL

## 2023-11-28 VITALS
HEIGHT: 65 IN | WEIGHT: 220 LBS | SYSTOLIC BLOOD PRESSURE: 116 MMHG | DIASTOLIC BLOOD PRESSURE: 79 MMHG | BODY MASS INDEX: 36.65 KG/M2

## 2023-11-28 DIAGNOSIS — R19.00 PELVIC MASS: Primary | ICD-10-CM

## 2023-11-28 PROCEDURE — 1036F TOBACCO NON-USER: CPT | Performed by: STUDENT IN AN ORGANIZED HEALTH CARE EDUCATION/TRAINING PROGRAM

## 2023-11-28 PROCEDURE — G8427 DOCREV CUR MEDS BY ELIG CLIN: HCPCS | Performed by: STUDENT IN AN ORGANIZED HEALTH CARE EDUCATION/TRAINING PROGRAM

## 2023-11-28 PROCEDURE — 99213 OFFICE O/P EST LOW 20 MIN: CPT | Performed by: STUDENT IN AN ORGANIZED HEALTH CARE EDUCATION/TRAINING PROGRAM

## 2023-11-28 PROCEDURE — G8417 CALC BMI ABV UP PARAM F/U: HCPCS | Performed by: STUDENT IN AN ORGANIZED HEALTH CARE EDUCATION/TRAINING PROGRAM

## 2023-11-28 PROCEDURE — G8484 FLU IMMUNIZE NO ADMIN: HCPCS | Performed by: STUDENT IN AN ORGANIZED HEALTH CARE EDUCATION/TRAINING PROGRAM

## 2023-11-28 PROCEDURE — 36415 COLL VENOUS BLD VENIPUNCTURE: CPT | Performed by: STUDENT IN AN ORGANIZED HEALTH CARE EDUCATION/TRAINING PROGRAM

## 2023-11-28 NOTE — PROGRESS NOTES
[facility] requires prior auth for [id number]  Dr. Cookie Reyes still listed out of network, but allowed me to upload documentation  Pending auth# [id number]  12/1/23-3/31/24
Additional pack years: 0.00     Total pack years: 3.75     Types: Cigarettes     Quit date: 2023     Years since quittin.0    Smokeless tobacco: Never    Tobacco comments:     1 pack every 4 days (reviewed 7/10/23)   Vaping Use    Vaping Use: Never used   Substance and Sexual Activity    Alcohol use: Not Currently     Comment: caffeine: 2 cups coffee a day, 1 pop a day    Drug use: Yes     Types: Marijuana Melvinia Jarocho)     Comment: twice daily    Sexual activity: Yes     Partners: Male   Other Topics Concern    Not on file   Social History Narrative    Not on file     Social Determinants of Health     Financial Resource Strain: High Risk (2023)    Overall Financial Resource Strain (CARDIA)     Difficulty of Paying Living Expenses: Very hard   Food Insecurity: Food Insecurity Present (2023)    Hunger Vital Sign     Worried About Running Out of Food in the Last Year: Sometimes true     Ran Out of Food in the Last Year: Sometimes true   Transportation Needs: Unmet Transportation Needs (2023)    PRAPARE - Transportation     Lack of Transportation (Medical):  Not on file     Lack of Transportation (Non-Medical): Yes   Physical Activity: Not on file   Stress: Not on file   Social Connections: Not on file   Intimate Partner Violence: Not on file   Housing Stability: High Risk (2023)    Housing Stability Vital Sign     Unable to Pay for Housing in the Last Year: Not on file     Number of [address] in the Last Year: Not on file     Unstable Housing in the Last Year: Yes       Family History   Problem Relation Age of Onset    Heart Attack Father     Stroke Mother        Current Outpatient Medications   Medication Sig Dispense Refill    albuterol sulfate HFA (VENTOLIN HFA) 108 (90 Base) MCG/ACT inhaler Inhale 2 puffs into the lungs every 6 hours as needed for Wheezing      fluticasone (FLOVENT HFA) 110 MCG/ACT inhaler Inhale 1 puff into the lungs 2 times daily      Nebulizer MISC by Does not apply

## 2023-11-29 LAB
HCG SERPL QL: NEGATIVE
LDH SERPL L TO P-CCNC: 181 U/L (ref 100–190)

## 2023-12-01 LAB — AFP-TM SERPL-MCNC: 1.6 UG/L

## 2023-12-11 ENCOUNTER — HOSPITAL ENCOUNTER (OUTPATIENT)
Dept: PULMONOLOGY | Age: 33
Discharge: HOME | End: 2023-12-11

## 2023-12-13 ENCOUNTER — TELEPHONE (OUTPATIENT)
Dept: CARDIOLOGY CLINIC | Age: 33
End: 2023-12-13

## 2023-12-13 NOTE — TELEPHONE ENCOUNTER
Pt needs surgical clearance for laparoscopy with right ovarian cystectomy due to a dermoid cyst and abdominal mass. Thank you!   Surgery is with Dr. Jarrod Claire

## 2024-01-10 ENCOUNTER — HOSPITAL ENCOUNTER (EMERGENCY)
Age: 34
Discharge: HOME | End: 2024-01-10
Payer: COMMERCIAL

## 2024-01-10 VITALS
OXYGEN SATURATION: 99 % | HEART RATE: 87 BPM | RESPIRATION RATE: 14 BRPM | TEMPERATURE: 97.6 F | SYSTOLIC BLOOD PRESSURE: 168 MMHG | DIASTOLIC BLOOD PRESSURE: 91 MMHG

## 2024-01-10 DIAGNOSIS — M54.42 ACUTE LEFT-SIDED LOW BACK PAIN WITH LEFT-SIDED SCIATICA: Primary | ICD-10-CM

## 2024-01-10 LAB
BACTERIA: NEGATIVE /HPF
BILIRUBIN URINE: NEGATIVE MG/DL
BLOOD, URINE: ABNORMAL
CHP ED QC CHECK: YES
CLARITY: CLEAR
COLOR: YELLOW
GLUCOSE, URINE: NEGATIVE MG/DL
KETONES, URINE: NEGATIVE MG/DL
LEUKOCYTE ESTERASE, URINE: NEGATIVE
MUCUS: ABNORMAL HPF
NITRITE URINE, QUANTITATIVE: NEGATIVE
PH, URINE: 6 (ref 5–8)
PREGNANCY TEST URINE, POC: NEGATIVE
PREGNANCY TEST URINE, POC: NEGATIVE
PROTEIN UA: NEGATIVE MG/DL
RBC URINE: 3 /HPF (ref 0–6)
SPECIFIC GRAVITY UA: 1.02 (ref 1–1.03)
SQUAMOUS EPITHELIAL: 1 /HPF
TRICHOMONAS: ABNORMAL /HPF
UROBILINOGEN, URINE: 0.2 MG/DL (ref 0.2–1)
WBC UA: 1 /HPF (ref 0–5)

## 2024-01-10 PROCEDURE — 81001 URINALYSIS AUTO W/SCOPE: CPT

## 2024-01-10 PROCEDURE — 6360000002 HC RX W HCPCS: Performed by: NURSE PRACTITIONER

## 2024-01-10 PROCEDURE — 96372 THER/PROPH/DIAG INJ SC/IM: CPT

## 2024-01-10 PROCEDURE — 81025 URINE PREGNANCY TEST: CPT

## 2024-01-10 PROCEDURE — 99284 EMERGENCY DEPT VISIT MOD MDM: CPT

## 2024-01-10 RX ORDER — METHOCARBAMOL 750 MG/1
750 TABLET, FILM COATED ORAL 4 TIMES DAILY
Qty: 20 TABLET | Refills: 0 | Status: SHIPPED | OUTPATIENT
Start: 2024-01-10 | End: 2024-01-10

## 2024-01-10 RX ORDER — IBUPROFEN 600 MG/1
600 TABLET ORAL 3 TIMES DAILY PRN
Qty: 30 TABLET | Refills: 0 | Status: SHIPPED | OUTPATIENT
Start: 2024-01-10 | End: 2024-01-10

## 2024-01-10 RX ORDER — IBUPROFEN 600 MG/1
600 TABLET ORAL 3 TIMES DAILY PRN
Qty: 30 TABLET | Refills: 0 | Status: SHIPPED | OUTPATIENT
Start: 2024-01-10

## 2024-01-10 RX ORDER — ORPHENADRINE CITRATE 30 MG/ML
60 INJECTION INTRAMUSCULAR; INTRAVENOUS ONCE
Status: COMPLETED | OUTPATIENT
Start: 2024-01-10 | End: 2024-01-10

## 2024-01-10 RX ORDER — LIDOCAINE 4 G/G
1 PATCH TOPICAL DAILY
Qty: 30 PATCH | Refills: 0 | Status: SHIPPED | OUTPATIENT
Start: 2024-01-10 | End: 2024-01-10

## 2024-01-10 RX ORDER — MORPHINE SULFATE 4 MG/ML
4 INJECTION, SOLUTION INTRAMUSCULAR; INTRAVENOUS ONCE
Status: COMPLETED | OUTPATIENT
Start: 2024-01-10 | End: 2024-01-10

## 2024-01-10 RX ORDER — METHOCARBAMOL 750 MG/1
750 TABLET, FILM COATED ORAL 4 TIMES DAILY
Qty: 20 TABLET | Refills: 0 | Status: SHIPPED | OUTPATIENT
Start: 2024-01-10 | End: 2024-01-15

## 2024-01-10 RX ORDER — LIDOCAINE 4 G/G
1 PATCH TOPICAL DAILY
Qty: 30 PATCH | Refills: 0 | Status: SHIPPED | OUTPATIENT
Start: 2024-01-10 | End: 2024-02-09

## 2024-01-10 RX ORDER — KETOROLAC TROMETHAMINE 15 MG/ML
30 INJECTION, SOLUTION INTRAMUSCULAR; INTRAVENOUS ONCE
Status: COMPLETED | OUTPATIENT
Start: 2024-01-10 | End: 2024-01-10

## 2024-01-10 RX ADMIN — MORPHINE SULFATE 4 MG: 4 INJECTION, SOLUTION INTRAMUSCULAR; INTRAVENOUS at 16:24

## 2024-01-10 RX ADMIN — ORPHENADRINE CITRATE 60 MG: 60 INJECTION INTRAMUSCULAR; INTRAVENOUS at 16:24

## 2024-01-10 RX ADMIN — KETOROLAC TROMETHAMINE 30 MG: 15 INJECTION, SOLUTION INTRAMUSCULAR; INTRAVENOUS at 16:24

## 2024-01-10 ASSESSMENT — PAIN DESCRIPTION - ORIENTATION: ORIENTATION: MID;LOWER

## 2024-01-10 ASSESSMENT — PAIN DESCRIPTION - DESCRIPTORS: DESCRIPTORS: SHARP;STABBING

## 2024-01-10 ASSESSMENT — PAIN DESCRIPTION - LOCATION: LOCATION: BACK

## 2024-01-10 ASSESSMENT — PAIN - FUNCTIONAL ASSESSMENT: PAIN_FUNCTIONAL_ASSESSMENT: ACTIVITIES ARE NOT PREVENTED

## 2024-01-10 ASSESSMENT — PAIN SCALES - GENERAL: PAINLEVEL_OUTOF10: 10

## 2024-01-10 NOTE — ED NOTES
Pt states \"I have something I wanted to add, my fingers and feet were tingling and getting numb also.\"

## 2024-01-10 NOTE — ED PROVIDER NOTES
neurovascular examination. Similarly,  tract pathology (i.e., nephrolithiasis or ureterolithiasis, renal colic, etc) secondary to the fact that a musculoskeletal-type etiology is more likely given the patient's characteristic history/physical examination findings.  Urine is negative for infection.  There is some hematuria present.  However again patient is not presenting consistent with kidney stone.  The remote but distinct possibility of occult pathology was explained in detail to the patient, who verbalized understanding.     CONSULTS: None      Patient’s care significantly limited by social determinants of health including: na    Disposition Considerations (tests considered but not done, Shared Decision Making, Pt Expectation of Test or Tx.):     Appropriate for outpatient management.  Strict ED return guidelines were reviewed.  Patient discharged in stable condition.    FINAL IMPRESSION      1. Acute left-sided low back pain with left-sided sciatica          DISPOSITION/PLAN   DISPOSITION Decision To Discharge 01/10/2024 04:54:23 PM      PATIENT REFERRED TO:  Jake Swartz APRN - CNP  [address] [facility]  [id number]  [facility] [address]  [phone]    Schedule an appointment as soon as possible for a visit in 3 days  follow up      DISCHARGE MEDICATIONS:  New Prescriptions    IBUPROFEN (ADVIL;MOTRIN) 600 MG TABLET    Take 1 tablet by mouth 3 times daily as needed for Pain    LIDOCAINE 4 % EXTERNAL PATCH    Place 1 patch onto the skin daily    METHOCARBAMOL (ROBAXIN) 750 MG TABLET    Take 1 tablet by mouth 4 times daily for 5 days          (Please note that portions of this note were completed with a voice recognition program.  Efforts were made to edit the dictations but occasionally words are mis-transcribed.)    JOI Anderson CNP (electronically signed)       Laila Samayoa APRN - CNP  01/10/24 4849

## 2024-01-17 NOTE — PROGRESS NOTES
1/17/23 - .LM with my call-back # concerning  surgery @ [facility] on  1/26/24.  Please call the PAT Nurse for a phone assessment, surgery instructions and to set a date for pre-surgery testing.

## 2024-01-19 NOTE — PROGRESS NOTES
Department of Obstetrics and Gynecology  Preoperative Office Visit  Name:  Светлана Tinsley   CSN: [id number]    : 1990   Age: 33 y.o.       Chief Complaint   Patient presents with    Pre-op Exam     Pt here for pre-op. Scheduled for Laparoscopic with R ovarian cystectomy d/t dermoid cyst and abdominal abscess on 24. Preop drink and wash given with instructions. Consent form signed       SUBJECTIVE:  Светлана Tinsley is a 33 y.o.  who presents with complaint of right sided pelvic mass, possible dermoid cyst. Patient had CT on 23 which incidentally showed fatty mass in pelvis measuring 5.4 x 4.6 cm suggesting dermoid cyst. TVUS showed 6.38 right ovarian complex mass, possible dermoid. Tumor markers were normal. Patient desires to proceed with surgical intervention.    Hx of LSO, still has right ovary.      Patient's medications, allergies, past medical, surgical, social and family histories were reviewed and updated as appropriate.    Past Medical History:   Diagnosis Date    Anxiety     Asthma     Depression     Hypertension     Insomnia     Ovarian cyst 2023    right - as seen on imaging    PTSD (post-traumatic stress disorder)        Past Surgical History:   Procedure Laterality Date    CARDIAC CATHETERIZATION  2023    Normal coronaries - Dr. Osorio    FINGER SURGERY Left     index    FOOT DEBRIDEMENT Right 2023    FOOT INCISION AND DRAINAGE performed by Jake Lambert DO at [facility] OR    PARTIAL HYSTERECTOMY (CERVIX NOT REMOVED)      L ovary removed and fallopian tubes removed       Social History     Socioeconomic History    Marital status: Single     Spouse name: Not on file    Number of children: Not on file    Years of education: Not on file    Highest education level: Not on file   Occupational History    Not on file   Tobacco Use    Smoking status: Former     Current packs/day: 0.00     Average packs/day: 0.3 packs/day for 15.0 years (3.8 ttl pk-yrs)     Types:

## 2024-01-19 NOTE — PROGRESS NOTES
.Surgery @ [facility] on 1/26/24 you will be called 1/25/24 with times    NOTHING TO EAT OR DRINK AFTER MIDNIGHT DAY OF SURGERY    1. Enter thru the hospital main entrance on day of surgery, check in at the Information Desk. If you arrive prior to 6:00am, enter thru the ER entrance.    2. Follow the directions as prescribed by the doctor for your procedure and medications.         Morning of surgery take: Amlodipine & Clonidine with sips of water         Stop vitamins, supplements and NSAIDS: 1/20/24    3. Check with your Doctor regarding stopping blood thinners and follow their instructions.    4. Do not smoke, vape or use chewing tobacco morning of surgery. Do not drink any alcoholic beverages 24 hours prior to surgery.       This includes NA Beer. No street drugs 7 days prior to surgery.    5. If you have dentures, contacts of glasses they will be removed before going to the OR; please bring a case.    6. Please bring picture ID, insurance card, paperwork from the doctor’s office (H & P, Consent, & card for implantable devices).    7. Take a shower with an antibacterial soap the night before surgery and the morning of surgery. Do not put anything on your skin      After your morning shower.    8. You will need a responsible adult to drive you home and check on you after surgery.

## 2024-01-19 NOTE — PROGRESS NOTES
1/19/24 - 2nd message:  DIVINE with my call-back # concerning  surgery @ [facility] on  1/26/24.  Please call the PAT Nurse for a phone assessment, surgery instructions and to set a date for pre-surgery labs.

## 2024-01-22 ENCOUNTER — HOSPITAL ENCOUNTER (OUTPATIENT)
Age: 34
Discharge: HOME | End: 2024-01-24
Payer: COMMERCIAL

## 2024-01-22 ENCOUNTER — HOSPITAL ENCOUNTER (OUTPATIENT)
Age: 34
Discharge: HOME | End: 2024-01-22
Payer: COMMERCIAL

## 2024-01-22 DIAGNOSIS — Z01.818 PRE-OP TESTING: ICD-10-CM

## 2024-01-22 LAB
ANION GAP SERPL CALCULATED.3IONS-SCNC: 14 MMOL/L (ref 7–16)
BASOPHILS ABSOLUTE: 0.1 K/CU MM
BASOPHILS RELATIVE PERCENT: 0.7 % (ref 0–1)
BUN SERPL-MCNC: 14 MG/DL (ref 6–23)
CALCIUM SERPL-MCNC: 9.9 MG/DL (ref 8.3–10.6)
CHLORIDE BLD-SCNC: 103 MMOL/L (ref 99–110)
CO2: 23 MMOL/L (ref 21–32)
CREAT SERPL-MCNC: 0.7 MG/DL (ref 0.6–1.1)
DIFFERENTIAL TYPE: ABNORMAL
EOSINOPHILS ABSOLUTE: 0.5 K/CU MM
EOSINOPHILS RELATIVE PERCENT: 5.6 % (ref 0–3)
GFR SERPL CREATININE-BSD FRML MDRD: >60 ML/MIN/1.73M2
GLUCOSE SERPL-MCNC: 80 MG/DL (ref 70–99)
HCT VFR BLD CALC: 42.7 % (ref 37–47)
HEMOGLOBIN: 13.9 GM/DL (ref 12.5–16)
IMMATURE NEUTROPHIL %: 0.1 % (ref 0–0.43)
LYMPHOCYTES ABSOLUTE: 2.5 K/CU MM
LYMPHOCYTES RELATIVE PERCENT: 30.6 % (ref 24–44)
MCH RBC QN AUTO: 30.1 PG (ref 27–31)
MCHC RBC AUTO-ENTMCNC: 32.6 % (ref 32–36)
MCV RBC AUTO: 92.4 FL (ref 78–100)
MONOCYTES ABSOLUTE: 0.6 K/CU MM
MONOCYTES RELATIVE PERCENT: 7.2 % (ref 0–4)
NUCLEATED RBC %: 0 %
PDW BLD-RTO: 12.3 % (ref 11.7–14.9)
PLATELET # BLD: 230 K/CU MM (ref 140–440)
PMV BLD AUTO: 10.6 FL (ref 7.5–11.1)
POTASSIUM SERPL-SCNC: 4.1 MMOL/L (ref 3.5–5.1)
RBC # BLD: 4.62 M/CU MM (ref 4.2–5.4)
SEGMENTED NEUTROPHILS ABSOLUTE COUNT: 4.6 K/CU MM
SEGMENTED NEUTROPHILS RELATIVE PERCENT: 55.8 % (ref 36–66)
SODIUM BLD-SCNC: 140 MMOL/L (ref 135–145)
TOTAL IMMATURE NEUTOROPHIL: 0.01 K/CU MM
TOTAL NUCLEATED RBC: 0 K/CU MM
WBC # BLD: 8.3 K/CU MM (ref 4–10.5)

## 2024-01-22 PROCEDURE — 80048 BASIC METABOLIC PNL TOTAL CA: CPT

## 2024-01-22 PROCEDURE — 93005 ELECTROCARDIOGRAM TRACING: CPT | Performed by: ANESTHESIOLOGY

## 2024-01-22 PROCEDURE — 85025 COMPLETE CBC W/AUTO DIFF WBC: CPT

## 2024-01-22 PROCEDURE — 36415 COLL VENOUS BLD VENIPUNCTURE: CPT

## 2024-01-23 ENCOUNTER — OFFICE VISIT (OUTPATIENT)
Dept: OBGYN | Age: 34
End: 2024-01-23
Payer: COMMERCIAL

## 2024-01-23 VITALS
SYSTOLIC BLOOD PRESSURE: 143 MMHG | WEIGHT: 217 LBS | BODY MASS INDEX: 36.15 KG/M2 | HEIGHT: 65 IN | DIASTOLIC BLOOD PRESSURE: 95 MMHG | RESPIRATION RATE: 18 BRPM

## 2024-01-23 DIAGNOSIS — R19.00 PELVIC MASS: Primary | ICD-10-CM

## 2024-01-23 PROCEDURE — G8427 DOCREV CUR MEDS BY ELIG CLIN: HCPCS | Performed by: STUDENT IN AN ORGANIZED HEALTH CARE EDUCATION/TRAINING PROGRAM

## 2024-01-23 PROCEDURE — G8484 FLU IMMUNIZE NO ADMIN: HCPCS | Performed by: STUDENT IN AN ORGANIZED HEALTH CARE EDUCATION/TRAINING PROGRAM

## 2024-01-23 PROCEDURE — G8417 CALC BMI ABV UP PARAM F/U: HCPCS | Performed by: STUDENT IN AN ORGANIZED HEALTH CARE EDUCATION/TRAINING PROGRAM

## 2024-01-23 PROCEDURE — 99212 OFFICE O/P EST SF 10 MIN: CPT | Performed by: STUDENT IN AN ORGANIZED HEALTH CARE EDUCATION/TRAINING PROGRAM

## 2024-01-23 PROCEDURE — 1036F TOBACCO NON-USER: CPT | Performed by: STUDENT IN AN ORGANIZED HEALTH CARE EDUCATION/TRAINING PROGRAM

## 2024-01-23 ASSESSMENT — PATIENT HEALTH QUESTIONNAIRE - PHQ9
2. FEELING DOWN, DEPRESSED OR HOPELESS: 0
SUM OF ALL RESPONSES TO PHQ9 QUESTIONS 1 & 2: 0
1. LITTLE INTEREST OR PLEASURE IN DOING THINGS: 0
SUM OF ALL RESPONSES TO PHQ QUESTIONS 1-9: 0

## 2024-01-23 NOTE — H&P
Department of Obstetrics and Gynecology  Preoperative History and Physical  Name:  Светлана Tinsley   CSN: [id number]   Attending Provider: Naomi Peraza DO  Location:  OR/NONE   : 1990   Age: 33 y.o.    Subjective:    Chief Complaint:  Right sided pelvic mass, possible dermoid    HPI:  Pt is a 33 y.o.  here today for scheduled Procedure(s):  LAPAROSCOPY WITH RIGHT OVARIAN CYSTECTOMY for complaint of right sided pelvic mass, possible dermoid cyst. Patient had CT on 23 which incidentally showed fatty mass in pelvis measuring 5.4 x 4.6 cm suggesting dermoid cyst. TVUS showed 6.38 right ovarian complex mass, possible dermoid. Tumor markers were normal. Patient desires to proceed with surgical intervention.     Hx of LSO, still has right ovary.    Primary Care Physician:  Jake Swartz APRN - CNP    Allergies   Allergen Reactions    Adhesive Tape Itching    Tramadol Headaches     Blurred vision, dizziness, itching,hives    Methocarbamol Nausea And Vomiting     Constipation, sweating    Norco [Hydrocodone-Acetaminophen] Nausea And Vomiting and Headaches       Current Facility-Administered Medications   Medication Dose Route Frequency Provider Last Rate Last Admin    lactated ringers IV soln infusion   IntraVENous Continuous Bernardo Elizalde MD 50 mL/hr at 24 0646 New Bag at 24 0646       Past Medical History:   Diagnosis Date    Anxiety     Asthma     Depression     Hypertension     Insomnia     Ovarian cyst 2023    right - as seen on imaging    PTSD (post-traumatic stress disorder)        Past Surgical History:   Procedure Laterality Date    CARDIAC CATHETERIZATION  2023    Normal coronaries - Dr. Osorio    FINGER SURGERY Left     index    FOOT DEBRIDEMENT Right 2023    FOOT INCISION AND DRAINAGE performed by Jake Lambert DO at [facility] OR    PARTIAL HYSTERECTOMY (CERVIX NOT REMOVED)      L ovary removed and fallopian tubes removed       Social History     Socioeconomic  RIGHT OVARIAN CYSTECTOMY.      Electronically signed: Naomi Peraza, DO 1/26/2024

## 2024-01-25 ENCOUNTER — ANESTHESIA EVENT (OUTPATIENT)
Dept: OPERATING ROOM | Age: 34
End: 2024-01-25
Payer: COMMERCIAL

## 2024-01-25 LAB
EKG ATRIAL RATE: 83 BPM
EKG DIAGNOSIS: NORMAL
EKG P AXIS: 65 DEGREES
EKG P-R INTERVAL: 182 MS
EKG Q-T INTERVAL: 376 MS
EKG QRS DURATION: 88 MS
EKG QTC CALCULATION (BAZETT): 441 MS
EKG R AXIS: 59 DEGREES
EKG T AXIS: 34 DEGREES
EKG VENTRICULAR RATE: 83 BPM

## 2024-01-25 ASSESSMENT — LIFESTYLE VARIABLES: SMOKING_STATUS: 1

## 2024-01-25 NOTE — PROGRESS NOTES
1/25/24 - LM for patient - surgery @ [facility] on  1/26/24 @ 0730, arrival 0600. NPO status and morning medications reviewed. Please call with any questions.

## 2024-01-25 NOTE — ANESTHESIA PRE PROCEDURE
Department of Anesthesiology  Preprocedure Note       Name:  Светлана Tinsley   Age:  33 y.o.  :  1990                                          MRN:  [id number]         Date:  2024      Surgeon: Surgeon(s):  Naomi Peraza DO Orr, Curtis John, MD    Procedure: Procedure(s):  LAPAROSCOPY WITH RIGHT OVARIAN CYSTECTOMY    Medications prior to admission:   Prior to Admission medications    Medication Sig Start Date End Date Taking? Authorizing Provider   ibuprofen (ADVIL;MOTRIN) 600 MG tablet Take 1 tablet by mouth 3 times daily as needed for Pain  Patient not taking: Reported on 2024 1/10/24   Laila Samayoa APRN - CNP   lidocaine 4 % external patch Place 1 patch onto the skin daily  Patient not taking: Reported on 2024 1/10/24 2/9/24  Laila Samayoa APRN - CNP   albuterol sulfate HFA (VENTOLIN HFA) 108 (90 Base) MCG/ACT inhaler Inhale 2 puffs into the lungs every 6 hours as needed for Wheezing    Robbie Merchant MD   fluticasone (FLOVENT HFA) 110 MCG/ACT inhaler Inhale 1 puff into the lungs 2 times daily    Robbie Merchant MD   Nebulizer MISC by Does not apply route  Patient not taking: Reported on 2024    Robbie Merchant MD   ipratropium 0.5 mg-albuterol 2.5 mg (DUONEB) 0.5-2.5 (3) MG/3ML SOLN nebulizer solution Inhale 3 mLs into the lungs every 4 hours    Robbie Merchant MD   budesonide (PULMICORT) 0.5 MG/2ML nebulizer suspension Take 2 mLs by nebulization 2 times daily    Robbie Merchant MD   sennosides-docusate sodium (SENOKOT-S) 8.6-50 MG tablet Take 2 tablets by mouth daily  Patient not taking: Reported on 2024 3/13/23   Emily Carlin MD   ondansetron (ZOFRAN) 4 MG tablet Take 1 tablet by mouth daily as needed for Nausea or Vomiting 3/13/23   Emily Carlin MD   amLODIPine (NORVASC) 10 MG tablet Take 1 tablet by mouth daily 23   Robbie Merchant MD   cloNIDine (CATAPRES) 0.2 MG tablet Take 1 tablet by mouth 2 times daily  vision, dizziness, itching,hives   • Methocarbamol Nausea And Vomiting     Constipation, sweating   • Norco [Hydrocodone-Acetaminophen] Nausea And Vomiting and Headaches       Problem List:    Patient Active Problem List   Diagnosis Code   • Cellulitis of right foot L03.115   • Abscess of right foot L02.611   • Pelvic mass R19.00       Past Medical History:        Diagnosis Date   • Anxiety    • Asthma    • Depression    • Hypertension    • Insomnia    • Ovarian cyst 2023    right - as seen on imaging   • PTSD (post-traumatic stress disorder)        Past Surgical History:        Procedure Laterality Date   • CARDIAC CATHETERIZATION  2023    Normal coronaries - Dr. Osorio   • FINGER SURGERY Left     index   • FOOT DEBRIDEMENT Right 2023    FOOT INCISION AND DRAINAGE performed by Jake Lambert DO at [facility] OR   • PARTIAL HYSTERECTOMY (CERVIX NOT REMOVED)      L ovary removed and fallopian tubes removed       Social History:    Social History     Tobacco Use   • Smoking status: Former     Current packs/day: 0.00     Average packs/day: 0.3 packs/day for 15.0 years (3.8 ttl pk-yrs)     Types: Cigarettes     Start date: 2008     Quit date: 2023     Years since quittin.2   • Smokeless tobacco: Never   • Tobacco comments:     1 pack every 4 days (reviewed 7/10/23)   Substance Use Topics   • Alcohol use: Not Currently     Comment: caffeine: 2 cups coffee a day, 1 pop a day                                Counseling given: Not Answered  Tobacco comments: 1 pack every 4 days (reviewed 7/10/23)      Vital Signs (Current):   Vitals:    24 1247   Weight: 98.4 kg (217 lb)   Height: 1.651 m (5' 5\")                                              BP Readings from Last 3 Encounters:   24 (!) 143/95   01/10/24 (!) 168/91   23 116/79       NPO Status:                                                                                 BMI:   Wt Readings from Last 3 Encounters:   24 98.4  kg (217 lb)   01/08/24 98.4 kg (217 lb)   11/28/23 99.8 kg (220 lb)     Body mass index is 36.11 kg/m².    CBC:   Lab Results   Component Value Date/Time    WBC 8.3 01/22/2024 12:29 PM    RBC 4.62 01/22/2024 12:29 PM    HGB 13.9 01/22/2024 12:29 PM    HCT 42.7 01/22/2024 12:29 PM    MCV 92.4 01/22/2024 12:29 PM    RDW 12.3 01/22/2024 12:29 PM     01/22/2024 12:29 PM       CMP:   Lab Results   Component Value Date/Time     01/22/2024 12:29 PM    K 4.1 01/22/2024 12:29 PM     01/22/2024 12:29 PM    CO2 23 01/22/2024 12:29 PM    BUN 14 01/22/2024 12:29 PM    CREATININE 0.7 01/22/2024 12:29 PM    LABGLOM >60 01/22/2024 12:29 PM    GLUCOSE 80 01/22/2024 12:29 PM    PROT 7.1 03/14/2023 03:30 AM    CALCIUM 9.9 01/22/2024 12:29 PM    BILITOT 0.2 03/14/2023 03:30 AM    ALKPHOS 99 03/14/2023 03:30 AM    AST 50 03/14/2023 03:30 AM    ALT 64 03/14/2023 03:30 AM       POC Tests: No results for input(s): \"POCGLU\", \"POCNA\", \"POCK\", \"POCCL\", \"POCBUN\", \"POCHEMO\", \"POCHCT\" in the last 72 hours.    Coags: No results found for: \"PROTIME\", \"INR\", \"APTT\"    HCG (If Applicable):   Lab Results   Component Value Date    PREGTESTUR Negative 01/10/2024        ABGs: No results found for: \"PHART\", \"PO2ART\", \"QQC0WXU\", \"SLO7UIU\", \"BEART\", \"G4HCVJSK\"     Type & Screen (If Applicable):  No results found for: \"LABABO\", \"LABRH\"    Drug/Infectious Status (If Applicable):  No results found for: \"HIV\", \"HEPCAB\"    COVID-19 Screening (If Applicable):   Lab Results   Component Value Date/Time    COVID19 NOT DETECTED 02/15/2023 09:29 AM           Anesthesia Evaluation  Patient summary reviewed  Mallampati: IAirway:           Dental:          Pulmonary:   (+)           asthma: current smoker                          ROS comment: Marijuana   Cardiovascular:    (+) hypertension:                  Neuro/Psych:   (+) psychiatric history:            GI/Hepatic/Renal:            ROS comment: Ovarian mass.   Endo/Other:

## 2024-01-26 ENCOUNTER — HOSPITAL ENCOUNTER (OUTPATIENT)
Age: 34
Setting detail: OUTPATIENT SURGERY
Discharge: HOME | End: 2024-01-26
Attending: STUDENT IN AN ORGANIZED HEALTH CARE EDUCATION/TRAINING PROGRAM | Admitting: STUDENT IN AN ORGANIZED HEALTH CARE EDUCATION/TRAINING PROGRAM
Payer: COMMERCIAL

## 2024-01-26 ENCOUNTER — ANESTHESIA (OUTPATIENT)
Dept: OPERATING ROOM | Age: 34
End: 2024-01-26
Payer: COMMERCIAL

## 2024-01-26 VITALS
OXYGEN SATURATION: 100 % | WEIGHT: 217 LBS | BODY MASS INDEX: 36.15 KG/M2 | RESPIRATION RATE: 20 BRPM | SYSTOLIC BLOOD PRESSURE: 145 MMHG | HEART RATE: 78 BPM | DIASTOLIC BLOOD PRESSURE: 73 MMHG | HEIGHT: 65 IN | TEMPERATURE: 97.2 F

## 2024-01-26 DIAGNOSIS — D27.9 DERMOID CYST OF OVARY, UNSPECIFIED LATERALITY: ICD-10-CM

## 2024-01-26 DIAGNOSIS — G89.18 POSTOPERATIVE PAIN: ICD-10-CM

## 2024-01-26 DIAGNOSIS — Z01.818 PRE-OP TESTING: Primary | ICD-10-CM

## 2024-01-26 DIAGNOSIS — R19.00 ABDOMINAL MASS, UNSPECIFIED ABDOMINAL LOCATION: ICD-10-CM

## 2024-01-26 PROCEDURE — 2720000010 HC SURG SUPPLY STERILE: Performed by: STUDENT IN AN ORGANIZED HEALTH CARE EDUCATION/TRAINING PROGRAM

## 2024-01-26 PROCEDURE — 2580000003 HC RX 258: Performed by: ANESTHESIOLOGY

## 2024-01-26 PROCEDURE — 2709999900 HC NON-CHARGEABLE SUPPLY: Performed by: STUDENT IN AN ORGANIZED HEALTH CARE EDUCATION/TRAINING PROGRAM

## 2024-01-26 PROCEDURE — 6360000002 HC RX W HCPCS: Performed by: ANESTHESIOLOGY

## 2024-01-26 PROCEDURE — 2500000003 HC RX 250 WO HCPCS: Performed by: NURSE ANESTHETIST, CERTIFIED REGISTERED

## 2024-01-26 PROCEDURE — 3700000000 HC ANESTHESIA ATTENDED CARE: Performed by: STUDENT IN AN ORGANIZED HEALTH CARE EDUCATION/TRAINING PROGRAM

## 2024-01-26 PROCEDURE — 7100000011 HC PHASE II RECOVERY - ADDTL 15 MIN: Performed by: STUDENT IN AN ORGANIZED HEALTH CARE EDUCATION/TRAINING PROGRAM

## 2024-01-26 PROCEDURE — 88112 CYTOPATH CELL ENHANCE TECH: CPT

## 2024-01-26 PROCEDURE — 3600000004 HC SURGERY LEVEL 4 BASE: Performed by: STUDENT IN AN ORGANIZED HEALTH CARE EDUCATION/TRAINING PROGRAM

## 2024-01-26 PROCEDURE — 3700000001 HC ADD 15 MINUTES (ANESTHESIA): Performed by: STUDENT IN AN ORGANIZED HEALTH CARE EDUCATION/TRAINING PROGRAM

## 2024-01-26 PROCEDURE — 3600000014 HC SURGERY LEVEL 4 ADDTL 15MIN: Performed by: STUDENT IN AN ORGANIZED HEALTH CARE EDUCATION/TRAINING PROGRAM

## 2024-01-26 PROCEDURE — 2500000003 HC RX 250 WO HCPCS: Performed by: STUDENT IN AN ORGANIZED HEALTH CARE EDUCATION/TRAINING PROGRAM

## 2024-01-26 PROCEDURE — 7100000000 HC PACU RECOVERY - FIRST 15 MIN: Performed by: STUDENT IN AN ORGANIZED HEALTH CARE EDUCATION/TRAINING PROGRAM

## 2024-01-26 PROCEDURE — 58662 LAPAROSCOPY EXCISE LESIONS: CPT | Performed by: STUDENT IN AN ORGANIZED HEALTH CARE EDUCATION/TRAINING PROGRAM

## 2024-01-26 PROCEDURE — 7100000010 HC PHASE II RECOVERY - FIRST 15 MIN: Performed by: STUDENT IN AN ORGANIZED HEALTH CARE EDUCATION/TRAINING PROGRAM

## 2024-01-26 PROCEDURE — 88305 TISSUE EXAM BY PATHOLOGIST: CPT

## 2024-01-26 PROCEDURE — 6370000000 HC RX 637 (ALT 250 FOR IP): Performed by: ANESTHESIOLOGY

## 2024-01-26 PROCEDURE — 7100000001 HC PACU RECOVERY - ADDTL 15 MIN: Performed by: STUDENT IN AN ORGANIZED HEALTH CARE EDUCATION/TRAINING PROGRAM

## 2024-01-26 PROCEDURE — 6360000002 HC RX W HCPCS: Performed by: NURSE ANESTHETIST, CERTIFIED REGISTERED

## 2024-01-26 RX ORDER — FENTANYL CITRATE 50 UG/ML
25 INJECTION, SOLUTION INTRAMUSCULAR; INTRAVENOUS EVERY 5 MIN PRN
Status: DISCONTINUED | OUTPATIENT
Start: 2024-01-26 | End: 2024-01-26 | Stop reason: HOSPADM

## 2024-01-26 RX ORDER — FENTANYL CITRATE 50 UG/ML
50 INJECTION, SOLUTION INTRAMUSCULAR; INTRAVENOUS EVERY 5 MIN PRN
Status: COMPLETED | OUTPATIENT
Start: 2024-01-26 | End: 2024-01-26

## 2024-01-26 RX ORDER — FENTANYL CITRATE 50 UG/ML
INJECTION, SOLUTION INTRAMUSCULAR; INTRAVENOUS PRN
Status: DISCONTINUED | OUTPATIENT
Start: 2024-01-26 | End: 2024-01-26 | Stop reason: SDUPTHER

## 2024-01-26 RX ORDER — ONDANSETRON 2 MG/ML
4 INJECTION INTRAMUSCULAR; INTRAVENOUS
Status: COMPLETED | OUTPATIENT
Start: 2024-01-26 | End: 2024-01-26

## 2024-01-26 RX ORDER — OXYCODONE HYDROCHLORIDE AND ACETAMINOPHEN 5; 325 MG/1; MG/1
1 TABLET ORAL EVERY 6 HOURS PRN
Qty: 20 TABLET | Refills: 0 | Status: SHIPPED | OUTPATIENT
Start: 2024-01-26 | End: 2024-02-02

## 2024-01-26 RX ORDER — ONDANSETRON 2 MG/ML
INJECTION INTRAMUSCULAR; INTRAVENOUS PRN
Status: DISCONTINUED | OUTPATIENT
Start: 2024-01-26 | End: 2024-01-26 | Stop reason: SDUPTHER

## 2024-01-26 RX ORDER — SODIUM CHLORIDE 9 MG/ML
INJECTION, SOLUTION INTRAVENOUS PRN
Status: DISCONTINUED | OUTPATIENT
Start: 2024-01-26 | End: 2024-01-26 | Stop reason: HOSPADM

## 2024-01-26 RX ORDER — SODIUM CHLORIDE, SODIUM LACTATE, POTASSIUM CHLORIDE, CALCIUM CHLORIDE 600; 310; 30; 20 MG/100ML; MG/100ML; MG/100ML; MG/100ML
INJECTION, SOLUTION INTRAVENOUS CONTINUOUS
Status: DISCONTINUED | OUTPATIENT
Start: 2024-01-26 | End: 2024-01-26 | Stop reason: HOSPADM

## 2024-01-26 RX ORDER — BUPIVACAINE HYDROCHLORIDE AND EPINEPHRINE 2.5; 5 MG/ML; UG/ML
INJECTION, SOLUTION EPIDURAL; INFILTRATION; INTRACAUDAL; PERINEURAL
Status: COMPLETED | OUTPATIENT
Start: 2024-01-26 | End: 2024-01-26

## 2024-01-26 RX ORDER — DOCUSATE SODIUM 100 MG/1
100 CAPSULE, LIQUID FILLED ORAL 2 TIMES DAILY PRN
Qty: 30 CAPSULE | Refills: 1 | COMMUNITY
Start: 2024-01-26

## 2024-01-26 RX ORDER — OXYCODONE HYDROCHLORIDE 5 MG/1
5 TABLET ORAL ONCE
Status: COMPLETED | OUTPATIENT
Start: 2024-01-26 | End: 2024-01-26

## 2024-01-26 RX ORDER — DEXAMETHASONE SODIUM PHOSPHATE 4 MG/ML
INJECTION, SOLUTION INTRA-ARTICULAR; INTRALESIONAL; INTRAMUSCULAR; INTRAVENOUS; SOFT TISSUE PRN
Status: DISCONTINUED | OUTPATIENT
Start: 2024-01-26 | End: 2024-01-26 | Stop reason: SDUPTHER

## 2024-01-26 RX ORDER — SODIUM CHLORIDE 0.9 % (FLUSH) 0.9 %
5-40 SYRINGE (ML) INJECTION EVERY 12 HOURS SCHEDULED
Status: DISCONTINUED | OUTPATIENT
Start: 2024-01-26 | End: 2024-01-26 | Stop reason: HOSPADM

## 2024-01-26 RX ORDER — IBUPROFEN 800 MG/1
800 TABLET ORAL EVERY 6 HOURS PRN
Qty: 30 TABLET | Refills: 1 | Status: SHIPPED | OUTPATIENT
Start: 2024-01-26

## 2024-01-26 RX ORDER — HYDRALAZINE HYDROCHLORIDE 20 MG/ML
10 INJECTION INTRAMUSCULAR; INTRAVENOUS
Status: DISCONTINUED | OUTPATIENT
Start: 2024-01-26 | End: 2024-01-26 | Stop reason: HOSPADM

## 2024-01-26 RX ORDER — ROCURONIUM BROMIDE 10 MG/ML
INJECTION, SOLUTION INTRAVENOUS PRN
Status: DISCONTINUED | OUTPATIENT
Start: 2024-01-26 | End: 2024-01-26 | Stop reason: SDUPTHER

## 2024-01-26 RX ORDER — ESMOLOL HYDROCHLORIDE 10 MG/ML
INJECTION INTRAVENOUS PRN
Status: DISCONTINUED | OUTPATIENT
Start: 2024-01-26 | End: 2024-01-26 | Stop reason: SDUPTHER

## 2024-01-26 RX ORDER — ONDANSETRON 2 MG/ML
4 INJECTION INTRAMUSCULAR; INTRAVENOUS
Status: DISCONTINUED | OUTPATIENT
Start: 2024-01-26 | End: 2024-01-26 | Stop reason: HOSPADM

## 2024-01-26 RX ORDER — MIDAZOLAM HYDROCHLORIDE 1 MG/ML
INJECTION INTRAMUSCULAR; INTRAVENOUS PRN
Status: DISCONTINUED | OUTPATIENT
Start: 2024-01-26 | End: 2024-01-26 | Stop reason: SDUPTHER

## 2024-01-26 RX ORDER — ONDANSETRON 4 MG/1
4 TABLET, FILM COATED ORAL 3 TIMES DAILY PRN
Qty: 15 TABLET | Refills: 1 | Status: SHIPPED | OUTPATIENT
Start: 2024-01-26

## 2024-01-26 RX ORDER — LABETALOL HYDROCHLORIDE 5 MG/ML
10 INJECTION, SOLUTION INTRAVENOUS
Status: DISCONTINUED | OUTPATIENT
Start: 2024-01-26 | End: 2024-01-26 | Stop reason: HOSPADM

## 2024-01-26 RX ORDER — SODIUM CHLORIDE 0.9 % (FLUSH) 0.9 %
5-40 SYRINGE (ML) INJECTION PRN
Status: DISCONTINUED | OUTPATIENT
Start: 2024-01-26 | End: 2024-01-26 | Stop reason: HOSPADM

## 2024-01-26 RX ORDER — LIDOCAINE HYDROCHLORIDE 20 MG/ML
INJECTION, SOLUTION INTRAVENOUS PRN
Status: DISCONTINUED | OUTPATIENT
Start: 2024-01-26 | End: 2024-01-26 | Stop reason: SDUPTHER

## 2024-01-26 RX ORDER — PROPOFOL 10 MG/ML
INJECTION, EMULSION INTRAVENOUS PRN
Status: DISCONTINUED | OUTPATIENT
Start: 2024-01-26 | End: 2024-01-26 | Stop reason: SDUPTHER

## 2024-01-26 RX ADMIN — ROCURONIUM BROMIDE 50 MG: 10 INJECTION INTRAVENOUS at 07:42

## 2024-01-26 RX ADMIN — FENTANYL CITRATE 50 MCG: 50 INJECTION, SOLUTION INTRAMUSCULAR; INTRAVENOUS at 09:29

## 2024-01-26 RX ADMIN — FENTANYL CITRATE 100 MCG: 50 INJECTION, SOLUTION INTRAMUSCULAR; INTRAVENOUS at 07:41

## 2024-01-26 RX ADMIN — ONDANSETRON 4 MG: 2 INJECTION INTRAMUSCULAR; INTRAVENOUS at 07:50

## 2024-01-26 RX ADMIN — FENTANYL CITRATE 50 MCG: 50 INJECTION, SOLUTION INTRAMUSCULAR; INTRAVENOUS at 09:22

## 2024-01-26 RX ADMIN — SODIUM CHLORIDE, POTASSIUM CHLORIDE, SODIUM LACTATE AND CALCIUM CHLORIDE: 600; 310; 30; 20 INJECTION, SOLUTION INTRAVENOUS at 06:46

## 2024-01-26 RX ADMIN — FENTANYL CITRATE 100 MCG: 50 INJECTION, SOLUTION INTRAMUSCULAR; INTRAVENOUS at 08:51

## 2024-01-26 RX ADMIN — SUGAMMADEX 200 MG: 100 INJECTION, SOLUTION INTRAVENOUS at 08:57

## 2024-01-26 RX ADMIN — DEXAMETHASONE SODIUM PHOSPHATE 4 MG: 4 INJECTION, SOLUTION INTRAMUSCULAR; INTRAVENOUS at 07:50

## 2024-01-26 RX ADMIN — FENTANYL CITRATE 50 MCG: 50 INJECTION, SOLUTION INTRAMUSCULAR; INTRAVENOUS at 09:35

## 2024-01-26 RX ADMIN — MIDAZOLAM 2 MG: 1 INJECTION INTRAMUSCULAR; INTRAVENOUS at 07:30

## 2024-01-26 RX ADMIN — ESMOLOL HYDROCHLORIDE 30 MG: 10 INJECTION, SOLUTION INTRAVENOUS at 08:26

## 2024-01-26 RX ADMIN — ONDANSETRON 4 MG: 2 INJECTION INTRAMUSCULAR; INTRAVENOUS at 09:17

## 2024-01-26 RX ADMIN — PROPOFOL 180 MG: 10 INJECTION, EMULSION INTRAVENOUS at 07:41

## 2024-01-26 RX ADMIN — PROPOFOL 40 MG: 10 INJECTION, EMULSION INTRAVENOUS at 08:26

## 2024-01-26 RX ADMIN — ESMOLOL HYDROCHLORIDE 30 MG: 10 INJECTION, SOLUTION INTRAVENOUS at 08:15

## 2024-01-26 RX ADMIN — LIDOCAINE HYDROCHLORIDE 100 MG: 20 INJECTION, SOLUTION INTRAVENOUS at 07:41

## 2024-01-26 RX ADMIN — FENTANYL CITRATE 50 MCG: 50 INJECTION, SOLUTION INTRAMUSCULAR; INTRAVENOUS at 09:17

## 2024-01-26 RX ADMIN — OXYCODONE HYDROCHLORIDE 5 MG: 5 TABLET ORAL at 10:00

## 2024-01-26 ASSESSMENT — PAIN SCALES - GENERAL
PAINLEVEL_OUTOF10: 4
PAINLEVEL_OUTOF10: 9
PAINLEVEL_OUTOF10: 10
PAINLEVEL_OUTOF10: 10
PAINLEVEL_OUTOF10: 7
PAINLEVEL_OUTOF10: 8

## 2024-01-26 ASSESSMENT — PAIN DESCRIPTION - ONSET
ONSET: ON-GOING

## 2024-01-26 ASSESSMENT — PAIN DESCRIPTION - PAIN TYPE
TYPE: SURGICAL PAIN

## 2024-01-26 ASSESSMENT — PAIN DESCRIPTION - LOCATION
LOCATION: ABDOMEN

## 2024-01-26 ASSESSMENT — PAIN DESCRIPTION - ORIENTATION
ORIENTATION: LOWER
ORIENTATION: RIGHT;LOWER
ORIENTATION: LOWER

## 2024-01-26 ASSESSMENT — PAIN DESCRIPTION - DESCRIPTORS
DESCRIPTORS: SHOOTING;SORE;DISCOMFORT
DESCRIPTORS: CRAMPING;SORE
DESCRIPTORS: CRAMPING;SORE
DESCRIPTORS: DISCOMFORT
DESCRIPTORS: SHARP;DISCOMFORT
DESCRIPTORS: CRAMPING;SORE
DESCRIPTORS: CRAMPING;SORE
DESCRIPTORS: ACHING;CRAMPING;DISCOMFORT
DESCRIPTORS: CRAMPING;SORE

## 2024-01-26 ASSESSMENT — PAIN - FUNCTIONAL ASSESSMENT
PAIN_FUNCTIONAL_ASSESSMENT: PREVENTS OR INTERFERES SOME ACTIVE ACTIVITIES AND ADLS
PAIN_FUNCTIONAL_ASSESSMENT: ACTIVITIES ARE NOT PREVENTED
PAIN_FUNCTIONAL_ASSESSMENT: PREVENTS OR INTERFERES SOME ACTIVE ACTIVITIES AND ADLS
PAIN_FUNCTIONAL_ASSESSMENT: 0-10
PAIN_FUNCTIONAL_ASSESSMENT: PREVENTS OR INTERFERES SOME ACTIVE ACTIVITIES AND ADLS
PAIN_FUNCTIONAL_ASSESSMENT: 0-10
PAIN_FUNCTIONAL_ASSESSMENT: 0-10
PAIN_FUNCTIONAL_ASSESSMENT: PREVENTS OR INTERFERES SOME ACTIVE ACTIVITIES AND ADLS

## 2024-01-26 ASSESSMENT — PAIN DESCRIPTION - FREQUENCY
FREQUENCY: CONTINUOUS
FREQUENCY: CONTINUOUS
FREQUENCY: INTERMITTENT
FREQUENCY: CONTINUOUS

## 2024-01-26 NOTE — PROGRESS NOTES
01/26/24 0715   Encounter Summary   Encounter Overview/Reason  Pre-Op   Service Provided For: Family   Referral/Consult From: [facility] [place]   Support System Parent   Last Encounter  01/26/24  (Spiritual and emotional support given to patient's mother.  Family of joshua; trusting in the Lord.  Time of prayer observed. No other needs at this time. Family informed how to contact .)   Complexity of Encounter Low   Begin Time 0615   End Time  0620   Total Time Calculated 5 min   Spiritual/Emotional needs   Type Spiritual Support   Assessment/Intervention/Outcome   Assessment Calm;Coping   Intervention Active listening;Prayer (assurance of)/[place];Nurtured Hope;Sustaining Presence/Ministry of presence   Outcome Comfort;Expressed Gratitude   Plan and Referrals   Plan/Referrals Continue Support (comment)  (Support as needed)

## 2024-01-26 NOTE — PROGRESS NOTES
0904- pt received from OR. Monitors placed and alarms on. Report received from Javy CASTANO. Oral airway in place upon arrival to PACU. Lap sites x3 on abdomen are clean, dry, and intact. Ruhtie pad in place, no drainage noted.  0914- oral airway removed.  0917- pt medicated for complaints of pain and nausea.  0922- pt medicated for complaints of pain.  0926- pt repositioned in bed.  0928- pt denies any nausea. Pt offered ice chips but is currently declining.  0929- pt medicated for complaints of pain.  0935- pt medicated for complaints of pain.  0940- pt repositioned in bed. Linens clean and dry. Ruthie pad checked, no drainage noted.  0949- pt transported to [facility] by RN and bedside report given to Isidra WAYNE.

## 2024-01-26 NOTE — DISCHARGE INSTRUCTIONS
[facility]  [phone]    Do not drive, work around machines or use equipment.  Do not drink any alcoholic beverages.  Do not smoke while alone.  Avoid making important decisions.  Plan to spend a quiet, relaxed evening @ home.  Resume normal activities as you begin to feel better.  Eat lightly for your first meal, then gradually increase your diet to what is normal for you.  In case of nausea, avoid food and drink only clear liquids.  Resume food as nausea ceases.  Notify your surgeon if you experience fever, chills, large amount of bleeding, difficulty breathing, persistent nausea and vomiting or any other disturbing problem.  Call for a follow-up appointment with your surgeon.    Dr. Hernandez   [address] [place] [address]  [address]   Phone [phone]   Fax [phone]     Hysterectomy, , and Exploratory Laparotomy    POST-OPERATIVE INSTRUCTIONS        DANGER SIGNS: Call OB/GYN if they occur:     A fever of more than 100.4   Report excessive bleeding (saturating a pad every ½ to 1 hour).   Severe pain, unrelieved by normal medication.   Shortness of breath, difficulty breathing or chest pain.   Painful or burning urination.   Severe back pain.   Painful swelling or redness in legs.   Drainage or pus from the wound.   Foul smelling vaginal discharge.     Eating and drinking:     You may continue your regular diet, however, we recommend that you:    Eat multiple small meals.   Eat foods high in protein such as meat, fish, eggs, and dairy products.   Avoid hot, spicy, and fatty foods.   Eat fruits with vitamin C (i.e. citrus fruits), vegetables, and high fiber foods.   Drink at least 3 quarts of liquid a day (try to drink more earlier in the day).     Constipation:     Narcotic pain medications such as Percocet, Vicodin, and codeine can cause constipation.   Try the following to avoid constipation:   Eat high fiber foods such as prunes or adda fiber supplement such

## 2024-01-26 NOTE — PROGRESS NOTES
0950- Patient arrived back to [facility]. Report given to this nurse from  Batsheva RN, Patient A&O able to make needs known, C/o pain 10/10 in right lower abdomen. Sites CDI. Requesting abdominal binder. Beverage of choice offered to patient. Call light in reach and bed in lowest position.   0953- Call to Dr. Elizalde new orders received.  1000- pain medication given per orders. Abdominal binder received and given to patient.  1030- Patient up to walk and escorted to rest room, Patient stated pain is 4/10 at this time.  1040- IV removed. Discharge instructions given to    Patient sitting on side of bed getting dressed assisted by this nurse and PCT.l  1105- Patient escorted to car via wheelchair transported home by mother.

## 2024-01-26 NOTE — OP NOTE
Department of Obstetrics and Gynecology  Operative Report  Name:  Светлана Tinsley   CSN: [id number]   Attending Provider: Naomi Peraza DO  Location:  OR/NONE   : 1990   Age: 33 y.o.    SURGEON:  Naomi Peraza DO    PRE-OP DIAGNOSIS:  Pelvic mass, right ovarian cyst, abdominal pain    POST-OP DIAGNOSIS: Right ovarian dermoid cyst, right sided hydrosalpinx    PROCEDURE:  Procedure(s):  LAPAROSCOPY WITH RIGHT OVARIAN CYSTECTOMY     SPECIMENS: Right ovarian cyst fluid, right ovarian cyst wall    ANESTHESIA:  General     EBL:  <50 cc    URINE OUTPUT: 150cc    DEVICES IMPLANTED: None    COMPLICATIONS: None    INDICATIONS FOR PROCEDURE: Светлана Tinsley is a 33 y.o. female who presented to the office c/o abdominal pain and right sided pelvic mass. Patient had CT on 23 which incidentally showed fatty mass in pelvis measuring 5.4 x 4.6 cm suggesting dermoid cyst. TVUS showed 6.38 right ovarian complex mass, possible dermoid. Tumor markers were normal. Patient desires to proceed with surgical intervention. She desires to preserve her fertility and has previously had her left ovary and left fallopian tube removed.    FINDINGS: On bimanual exam, the uterus was noted to be anteverted and of normal size, shape, and symmetry without any adnexal masses palpated. On laparoscopic exam what was visualized of the liver, bowel, and omentum was within normal limits. Thin omental adhesion to anterior abdominal wall. Approximately 5-6 cm cystic mass present int he pelvis with yellow cyst fluid and products consistent with dermoid cyst including hair. Left ovary and left fallopian tube surgically absent. Right fallopian tube with hydrosalpinx present.     PROCEDURE IN DETAIL: The risks, benefits, and alternatives were discussed with the patient including but not limited to risk of infection, bleeding, damage to bowel, bladder, ureters and other pelvic organs. The patient voiced understanding and agreed to proceed. The patient

## 2024-01-27 DIAGNOSIS — G89.18 POSTOPERATIVE PAIN: Primary | ICD-10-CM

## 2024-01-27 RX ORDER — METHOCARBAMOL 750 MG/1
750 TABLET, FILM COATED ORAL 4 TIMES DAILY
Qty: 40 TABLET | Refills: 0 | Status: SHIPPED | OUTPATIENT
Start: 2024-01-27 | End: 2024-02-06

## 2024-01-30 ENCOUNTER — TELEPHONE (OUTPATIENT)
Dept: OBGYN | Age: 34
End: 2024-01-30

## 2024-01-30 NOTE — TELEPHONE ENCOUNTER
Pt had Laparoscopy with R ovarian cystectomy on 1/26/23. Pt states she has fallen on her side and is now requesting pain medication along with zofran due to the pain. Pt states she was prescribed Roboxin  and that the medication causes her to have blurred vision and constipation. She also states she is to return to work on 1/31/024 but, requesting additional time off due to her pain. Please advise.

## 2024-02-05 PROBLEM — R19.00 PELVIC MASS: Status: RESOLVED | Noted: 2024-01-23 | Resolved: 2024-02-05

## 2024-02-05 PROBLEM — D27.0 DERMOID CYST OF RIGHT OVARY: Status: ACTIVE | Noted: 2024-02-05

## 2024-02-05 NOTE — ANESTHESIA POSTPROCEDURE EVALUATION
Department of Anesthesiology  Postprocedure Note    Patient: Светлана Tinsley  MRN: [id number]  YOB: 1990  Date of evaluation: 2/5/2024    Procedure Summary     Date: 01/26/24 Room / Location: [address]    Anesthesia Start: 0733 Anesthesia Stop: 0908    Procedure: LAPAROSCOPY WITH RIGHT OVARIAN CYSTECTOMY (Abdomen/Perineum) Diagnosis:       Dermoid cyst of ovary, unspecified laterality      Abdominal mass, unspecified abdominal location      (Dermoid cyst of ovary, unspecified laterality [D27.9])      (Abdominal mass, unspecified abdominal location [R19.00])    Surgeons: Naomi Peraza DO Responsible Provider: Bernardo Elizadle MD    Anesthesia Type: General ASA Status: 3          Anesthesia Type: General    Ernst Phase I: Ernst Score: 10    Ernst Phase II: Ernst Score: 10    Anesthesia Post Evaluation    Patient location during evaluation: PACU  Patient participation: complete - patient participated  Level of consciousness: sleepy but conscious  Pain score: 2  Airway patency: patent  Nausea & Vomiting: no nausea and no vomiting  Cardiovascular status: hemodynamically stable  Respiratory status: acceptable  Hydration status: euvolemic  Pain management: adequate        No notable events documented.

## 2024-02-06 ENCOUNTER — OFFICE VISIT (OUTPATIENT)
Dept: OBGYN | Age: 34
End: 2024-02-06

## 2024-02-06 VITALS
WEIGHT: 218 LBS | DIASTOLIC BLOOD PRESSURE: 68 MMHG | SYSTOLIC BLOOD PRESSURE: 123 MMHG | BODY MASS INDEX: 36.32 KG/M2 | HEIGHT: 65 IN

## 2024-02-06 DIAGNOSIS — Z09 POSTOP CHECK: ICD-10-CM

## 2024-02-06 DIAGNOSIS — D27.0 DERMOID CYST OF RIGHT OVARY: Primary | ICD-10-CM

## 2024-02-06 DIAGNOSIS — Z98.890 STATUS POST LAPAROSCOPIC PROCEDURE: ICD-10-CM

## 2024-02-06 DIAGNOSIS — K59.03 DRUG-INDUCED CONSTIPATION: ICD-10-CM

## 2024-02-06 RX ORDER — POLYETHYLENE GLYCOL 3350 17 G/17G
17 POWDER, FOR SOLUTION ORAL DAILY
COMMUNITY
Start: 2024-02-06 | End: 2024-03-07

## 2024-02-06 RX ORDER — DOCUSATE SODIUM 100 MG/1
100 CAPSULE, LIQUID FILLED ORAL 2 TIMES DAILY
COMMUNITY
Start: 2024-02-06

## 2024-02-06 NOTE — PROGRESS NOTES
Department of Obstetrics and Gynecology  Postoperative Office Visit  Name:  Светлана Tinsley   CSN: [id number]    : 1990   Age: 33 y.o.     Chief Complaint   Patient presents with    Post-Op Check     Pt had laparoscopy with right ovarian cystectomy on 2024. Pt c/o burning pain then a pulling pain when she bends on right side. Denies any bleeding. Pt is c/o constipation. Had first BM 3 days after surgery that was hard and solid.        Светлана Tinsley  1990 is a 33 y.o.  Female who presents of the office today for a post op follow up after laparoscopy with right ovarian cystectomy on 24. Eating a regular diet without difficulty. Reports she is having bowel movements, but still has constipation. Requesting refill of colace and miralax. Pain improving.                ALLERGIES  Allergies   Allergen Reactions    Adhesive Tape Itching    Tramadol Headaches     Blurred vision, dizziness, itching,hives    Methocarbamol Nausea And Vomiting     Constipation, sweating    Norco [Hydrocodone-Acetaminophen] Nausea And Vomiting and Headaches       CURRENT MEDICATIONS  Current Outpatient Medications   Medication Sig Dispense Refill    docusate sodium (COLACE) 100 MG capsule Take 1 capsule by mouth 2 times daily      polyethylene glycol (MIRALAX) 17 GM/SCOOP powder Take 17 g by mouth daily      methocarbamol (ROBAXIN) 750 MG tablet Take 1 tablet by mouth 4 times daily for 10 days 40 tablet 0    ondansetron (ZOFRAN) 4 MG tablet Take 1 tablet by mouth 3 times daily as needed for Nausea or Vomiting 15 tablet 1    docusate sodium (COLACE) 100 MG capsule Take 1 capsule by mouth 2 times daily as needed for Constipation 30 capsule 1    ibuprofen (ADVIL;MOTRIN) 800 MG tablet Take 1 tablet by mouth every 6 hours as needed for Pain 30 tablet 1    lidocaine 4 % external patch Place 1 patch onto the skin daily (Patient not taking: Reported on 2024) 30 patch 0    albuterol sulfate HFA (VENTOLIN HFA) 108 (90 Base)

## 2024-03-27 ENCOUNTER — APPOINTMENT (OUTPATIENT)
Dept: GENERAL RADIOLOGY | Age: 34
End: 2024-03-27

## 2024-03-27 ENCOUNTER — HOSPITAL ENCOUNTER (EMERGENCY)
Age: 34
Discharge: HOME | End: 2024-03-27

## 2024-03-27 VITALS
DIASTOLIC BLOOD PRESSURE: 81 MMHG | SYSTOLIC BLOOD PRESSURE: 144 MMHG | OXYGEN SATURATION: 99 % | TEMPERATURE: 98.1 F | HEART RATE: 81 BPM | RESPIRATION RATE: 16 BRPM

## 2024-03-27 DIAGNOSIS — S93.402A SPRAIN OF LEFT ANKLE, UNSPECIFIED LIGAMENT, INITIAL ENCOUNTER: Primary | ICD-10-CM

## 2024-03-27 DIAGNOSIS — M79.672 ACUTE FOOT PAIN, LEFT: ICD-10-CM

## 2024-03-27 DIAGNOSIS — R05.1 ACUTE COUGH: ICD-10-CM

## 2024-03-27 PROCEDURE — 71045 X-RAY EXAM CHEST 1 VIEW: CPT

## 2024-03-27 PROCEDURE — 73564 X-RAY EXAM KNEE 4 OR MORE: CPT

## 2024-03-27 PROCEDURE — 73630 X-RAY EXAM OF FOOT: CPT

## 2024-03-27 PROCEDURE — 99283 EMERGENCY DEPT VISIT LOW MDM: CPT

## 2024-03-27 PROCEDURE — 6370000000 HC RX 637 (ALT 250 FOR IP): Performed by: PHYSICIAN ASSISTANT

## 2024-03-27 PROCEDURE — 73610 X-RAY EXAM OF ANKLE: CPT

## 2024-03-27 RX ORDER — IBUPROFEN 600 MG/1
600 TABLET ORAL ONCE
Status: COMPLETED | OUTPATIENT
Start: 2024-03-27 | End: 2024-03-27

## 2024-03-27 RX ORDER — OXYCODONE HYDROCHLORIDE 5 MG/1
5 TABLET ORAL ONCE
Status: COMPLETED | OUTPATIENT
Start: 2024-03-27 | End: 2024-03-27

## 2024-03-27 RX ADMIN — IBUPROFEN 600 MG: 600 TABLET, FILM COATED ORAL at 10:01

## 2024-03-27 RX ADMIN — OXYCODONE HYDROCHLORIDE 5 MG: 5 TABLET ORAL at 10:01

## 2024-03-27 ASSESSMENT — PAIN SCALES - GENERAL
PAINLEVEL_OUTOF10: 5
PAINLEVEL_OUTOF10: 10

## 2024-03-27 ASSESSMENT — PAIN DESCRIPTION - ORIENTATION: ORIENTATION: LEFT

## 2024-03-27 ASSESSMENT — PAIN DESCRIPTION - LOCATION: LOCATION: FOOT;BACK

## 2024-03-27 ASSESSMENT — PAIN - FUNCTIONAL ASSESSMENT: PAIN_FUNCTIONAL_ASSESSMENT: 0-10

## 2024-03-27 NOTE — DISCHARGE INSTRUCTIONS
Follow-up with your primary care provider for reevaluation of any persisting cough.    Follow-up with podiatrist or if needed orthopedics for reevaluation of your foot and ankle pain.    Meanwhile continue to elevate your foot ice intermittently.  For pain relief you could use over-the-counter Tylenol or ibuprofen.  Return with any fevers, leg swelling, coughing up blood, worsening symptoms or any new concerns.

## 2024-03-27 NOTE — ED PROVIDER NOTES
[id number]  [phone]          Serafin Snell, BORA  [address] [facility] [address]  [phone]          Jake Swartz APRN - CNP  [address] [facility]  [id number] [address]  [phone]            DISCHARGE MEDICATIONS:  Discharge Medication List as of 3/27/2024 11:25 AM          DISCONTINUED MEDICATIONS:  Discharge Medication List as of 3/27/2024 11:25 AM                 (Please note that portions of this note were completed with a voice recognition program.  Efforts were made to edit the dictations but occasionally words are mis-transcribed.)    Gurwinder Dsouza PA-C (electronically signed)     Gurwinder Dsouza PA-C  03/27/24 5867

## 2024-06-24 ENCOUNTER — HOSPITAL ENCOUNTER (OUTPATIENT)
Dept: GENERAL RADIOLOGY | Age: 34
Discharge: HOME | End: 2024-06-24

## 2024-06-24 ENCOUNTER — HOSPITAL ENCOUNTER (OUTPATIENT)
Age: 34
Discharge: HOME | End: 2024-06-24

## 2024-06-24 DIAGNOSIS — R10.84 GENERALIZED ABDOMINAL PAIN: ICD-10-CM

## 2024-06-24 PROCEDURE — 74019 RADEX ABDOMEN 2 VIEWS: CPT

## 2024-08-19 PROBLEM — E66.812 CLASS 2 OBESITY DUE TO EXCESS CALORIES WITHOUT SERIOUS COMORBIDITY WITH BODY MASS INDEX (BMI) OF 36.0 TO 36.9 IN ADULT: Status: ACTIVE | Noted: 2024-08-19

## 2024-08-19 PROBLEM — E66.09 CLASS 2 OBESITY DUE TO EXCESS CALORIES WITHOUT SERIOUS COMORBIDITY WITH BODY MASS INDEX (BMI) OF 36.0 TO 36.9 IN ADULT: Status: ACTIVE | Noted: 2024-08-19

## 2025-06-16 ENCOUNTER — APPOINTMENT (OUTPATIENT)
Dept: CT IMAGING | Age: 35
End: 2025-06-16
Payer: MEDICAID

## 2025-06-16 ENCOUNTER — HOSPITAL ENCOUNTER (EMERGENCY)
Age: 35
Discharge: HOME | End: 2025-06-17
Payer: MEDICAID

## 2025-06-16 ENCOUNTER — APPOINTMENT (OUTPATIENT)
Dept: GENERAL RADIOLOGY | Age: 35
End: 2025-06-16
Payer: MEDICAID

## 2025-06-16 VITALS
TEMPERATURE: 98.3 F | HEART RATE: 88 BPM | OXYGEN SATURATION: 98 % | DIASTOLIC BLOOD PRESSURE: 85 MMHG | SYSTOLIC BLOOD PRESSURE: 131 MMHG | RESPIRATION RATE: 18 BRPM

## 2025-06-16 DIAGNOSIS — M25.562 ACUTE PAIN OF LEFT KNEE: ICD-10-CM

## 2025-06-16 DIAGNOSIS — S16.1XXA STRAIN OF NECK MUSCLE, INITIAL ENCOUNTER: ICD-10-CM

## 2025-06-16 DIAGNOSIS — S39.012A BACK STRAIN, INITIAL ENCOUNTER: ICD-10-CM
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
## 2025-06-16 DIAGNOSIS — M79.641 RIGHT HAND PAIN: ICD-10-CM

## 2025-06-16 LAB
HCG, URINE, POC: NEGATIVE
Lab: NORMAL
NEGATIVE QC PASS/FAIL: NORMAL
POSITIVE QC PASS/FAIL: NORMAL

## 2025-06-16 PROCEDURE — 72125 CT NECK SPINE W/O DYE: CPT

## 2025-06-16 PROCEDURE — 72128 CT CHEST SPINE W/O DYE: CPT

## 2025-06-16 PROCEDURE — 73562 X-RAY EXAM OF KNEE 3: CPT

## 2025-06-16 PROCEDURE — 73130 X-RAY EXAM OF HAND: CPT

## 2025-06-16 PROCEDURE — 70450 CT HEAD/BRAIN W/O DYE: CPT

## 2025-06-16 PROCEDURE — 72131 CT LUMBAR SPINE W/O DYE: CPT

## 2025-06-16 PROCEDURE — 99284 EMERGENCY DEPT VISIT MOD MDM: CPT

## 2025-06-16 PROCEDURE — 81025 URINE PREGNANCY TEST: CPT

## 2025-06-16 ASSESSMENT — PAIN DESCRIPTION - FREQUENCY: FREQUENCY: CONTINUOUS

## 2025-06-16 ASSESSMENT — LIFESTYLE VARIABLES
HOW OFTEN DO YOU HAVE A DRINK CONTAINING ALCOHOL: NEVER
HOW MANY STANDARD DRINKS CONTAINING ALCOHOL DO YOU HAVE ON A TYPICAL DAY: PATIENT DOES NOT DRINK

## 2025-06-16 ASSESSMENT — PAIN DESCRIPTION - DESCRIPTORS: DESCRIPTORS: ACHING

## 2025-06-16 ASSESSMENT — PAIN - FUNCTIONAL ASSESSMENT: PAIN_FUNCTIONAL_ASSESSMENT: ACTIVITIES ARE NOT PREVENTED

## 2025-06-16 ASSESSMENT — PAIN DESCRIPTION - ORIENTATION: ORIENTATION: LEFT

## 2025-06-16 ASSESSMENT — PAIN DESCRIPTION - PAIN TYPE: TYPE: ACUTE PAIN

## 2025-06-16 ASSESSMENT — PAIN SCALES - GENERAL: PAINLEVEL_OUTOF10: 7

## 2025-06-16 ASSESSMENT — PAIN DESCRIPTION - ONSET: ONSET: ON-GOING

## 2025-06-16 ASSESSMENT — PAIN DESCRIPTION - LOCATION: LOCATION: KNEE

## 2025-06-17 LAB — HCG, PREGNANCY URINE (POC): NEGATIVE

## 2025-06-17 RX ORDER — LIDOCAINE 50 MG/G
1 PATCH TOPICAL DAILY
Qty: 30 PATCH | Refills: 0 | Status: SHIPPED | OUTPATIENT
Start: 2025-06-17 | End: 2025-07-17

## 2025-06-17 RX ORDER — IBUPROFEN 800 MG/1
800 TABLET, FILM COATED ORAL EVERY 6 HOURS PRN
Qty: 30 TABLET | Refills: 0 | Status: SHIPPED | OUTPATIENT
Start: 2025-06-17

## 2025-06-17 RX ORDER — CYCLOBENZAPRINE HCL 10 MG
10 TABLET ORAL 3 TIMES DAILY PRN
Qty: 15 TABLET | Refills: 0 | Status: SHIPPED | OUTPATIENT
Start: 2025-06-17
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
## 2025-06-17 NOTE — ED NOTES
Reviewed plan of care including CT wait time and CT result time up to 120 minutes with understanding voiced. Pt to IW

## 2025-08-22 ENCOUNTER — HOSPITAL ENCOUNTER (OUTPATIENT)
Dept: GENERAL RADIOLOGY | Age: 35
End: 2025-08-22
Payer: MEDICAID

## 2025-08-22 ENCOUNTER — HOSPITAL ENCOUNTER (OUTPATIENT)
Dept: GENERAL RADIOLOGY | Age: 35
Discharge: HOME | End: 2025-08-22
Payer: MEDICAID

## 2025-08-22 DIAGNOSIS — R31.0 GROSS HEMATURIA: ICD-10-CM

## 2025-08-22 PROCEDURE — 74018 RADEX ABDOMEN 1 VIEW: CPT

## (undated) DEVICE — INTENDED FOR TISSUE SEPARATION, AND OTHER PROCEDURES THAT REQUIRE A SHARP SURGICAL BLADE TO PUNCTURE OR CUT.: Brand: BARD-PARKER ® STAINLESS STEEL BLADES

## (undated) DEVICE — KIT ANTIFOG SOL 6GM IPA DISP FOR ENDOSCP PROC FRED DEXIDE

## (undated) DEVICE — LINER,SEMI-RIGID,3000CC,50EA/CS: Brand: MEDLINE

## (undated) DEVICE — TOWEL,OR,DSP,ST,BLUE,STD,6/PK,12PK/CS: Brand: MEDLINE

## (undated) DEVICE — GLOVE ORANGE PI 8   MSG9080

## (undated) DEVICE — TRAY PREP DRY W/ PREM GLV 2 APPL 6 SPNG 2 UNDPD 1 OVERWRAP

## (undated) DEVICE — SOLUTION PREP PAINT POV IOD FOR SKIN MUCOUS MEM

## (undated) DEVICE — GOWN,SIRUS,POLYRNF,BRTHSLV,XLN/XL,20/CS: Brand: MEDLINE

## (undated) DEVICE — BANDAGE,GAUZE,BULKEE II,4.5"X4.1YD,STRL: Brand: MEDLINE

## (undated) DEVICE — SEALER ENDOSCP L37CM NANO COAT BLNT TIP LAP DIV

## (undated) DEVICE — MARKER SURG SKIN UTIL REGULAR/FINE 2 TIP W/ RUL AND 9 LBL

## (undated) DEVICE — STRIP WND PK W0.25INXL5YD IODO GZ TIGHTLY WVN CURAD

## (undated) DEVICE — SPONGE LAP W18XL18IN WHT COT 4 PLY FLD STRUNG RADPQ DISP ST

## (undated) DEVICE — Z INACTIVE COVER MAYO STAND CLTH

## (undated) DEVICE — YANKAUER,FLEXIBLE HANDLE,REGLR CAPACITY: Brand: MEDLINE INDUSTRIES, INC.

## (undated) DEVICE — SOLUTION IV 1000ML 0.9% SOD CHL FOR IRRIG PLAS CONT

## (undated) DEVICE — BANDAGE ADH W1XL3IN NAT FAB WVN FLX DURABLE N ADH PD SEAL

## (undated) DEVICE — DRAPE,EXTREMITY,89X128,STERILE: Brand: MEDLINE

## (undated) DEVICE — SUTURE SZ 0 27IN 5/8 CIR UR-6  TAPER PT VIOLET ABSRB VICRYL J603H

## (undated) DEVICE — SPONGE GZ W4XL8IN COT WVN 12 PLY

## (undated) DEVICE — ELECTRODE ES AD CRDLSS PT RET REM POLYHESIVE

## (undated) DEVICE — INSUFFLATION NEEDLE TO ESTABLISH PNEUMOPERITONEUM.: Brand: INSUFFLATION NEEDLE

## (undated) DEVICE — GOWN,ECLIPSE,POLYRNF,BRTHSLV,L,30/CS: Brand: MEDLINE

## (undated) DEVICE — SYRINGE MED 10ML LUERLOCK TIP W/O SFTY DISP

## (undated) DEVICE — BAG SPEC REM 224ML W4XL6IN DIA10MM 1 HND GYN DISP ENDOPCH

## (undated) DEVICE — TROCAR: Brand: KII® SLEEVE

## (undated) DEVICE — DRAPE, LAVH, STERILE: Brand: MEDLINE

## (undated) DEVICE — TUBE CULTURE LF UNIFORM BOTTOM STER

## (undated) DEVICE — DRAPE SHEET ULTRAGARD: Brand: MEDLINE

## (undated) DEVICE — 1LYRTR 16FR10ML 100%SILI SNAP: Brand: MEDLINE INDUSTRIES, INC.

## (undated) DEVICE — TROCAR: Brand: KII FIOS FIRST ENTRY

## (undated) DEVICE — MATERNITY PAD,HEAVY: Brand: CURITY

## (undated) DEVICE — TUBING INSUFFLATOR HEAT HI FLO SET PNEUMOCLEAR

## (undated) DEVICE — NEEDLE HYPO 23GA L1.5IN TURQ POLYPR HUB S STL THN WALL IM

## (undated) DEVICE — COUNTER NDL 30 COUNT FOAM STRP SGL MAG

## (undated) DEVICE — NEEDLE PRECISIONGUIDE 27X 3/8 1ML LS 200 S/C SYR 309659

## (undated) DEVICE — GLOVE SURG SZ 65 CRM LTX FREE POLYISOPRENE POLYMER BEAD ANTI

## (undated) DEVICE — BASIC PACK: Brand: CONVERTORS

## (undated) DEVICE — PENCIL ES CRD L10FT HND SWCHING ROCK SWCH W/ EDGE COAT BLDE

## (undated) DEVICE — CATHETER,URETHRAL,VINYL,MALE,16",16 FR: Brand: MEDLINE

## (undated) DEVICE — Z INACTIVE NO ACTIVE SUPPLIER APPLICATOR MEDICATED 26 CC TINT HI-LITE ORNG STRL CHLORAPREP

## (undated) DEVICE — PACK,BASIC,IX: Brand: MEDLINE

## (undated) DEVICE — TUBING, SUCTION, 9/32" X 10', STRAIGHT: Brand: MEDLINE

## (undated) DEVICE — NEEDLE HYPO 25GA L1.5IN BLU POLYPR HUB S STL REG BVL STR

## (undated) DEVICE — SUTURE VCRL SZ 4-0 L18IN ABSRB UD L19MM PS-2 3/8 CIR PRIM J496H

## (undated) DEVICE — Device

## (undated) DEVICE — SUTURE NONABSORBABLE MONOFILAMENT 4-0 FS-2 18 IN ETHILON 662H

## (undated) DEVICE — Z INACTIVE USE 2863041 SPONGE GZ W4XL4IN 100% COT 16 PLY RADPQ HIGHLY ABSRB

## (undated) DEVICE — GLOVE SURG SZ 6 CRM LTX FREE POLYISOPRENE POLYMER BEAD ANTI

## (undated) DEVICE — PAD,ABDOMINAL,5"X9",ST,LF,25/BX: Brand: MEDLINE INDUSTRIES, INC.